# Patient Record
Sex: FEMALE | Race: WHITE | NOT HISPANIC OR LATINO | Employment: FULL TIME | URBAN - METROPOLITAN AREA
[De-identification: names, ages, dates, MRNs, and addresses within clinical notes are randomized per-mention and may not be internally consistent; named-entity substitution may affect disease eponyms.]

---

## 2017-02-26 DIAGNOSIS — K21.9 GASTROESOPHAGEAL REFLUX DISEASE WITHOUT ESOPHAGITIS: ICD-10-CM

## 2017-02-26 NOTE — TELEPHONE ENCOUNTER
omeprazole (PRILOSEC) 40 MG capsule      Last Written Prescription Date: 3/7/16  Last Fill Quantity: 90,  # refills: 3   Last Office Visit with [facility], [facility] or [facility] prescribing provider: 9/27/16

## 2017-02-28 ENCOUNTER — ALLIED HEALTH/NURSE VISIT (OUTPATIENT)
Dept: NURSING | Facility: CLINIC | Age: 52
End: 2017-02-28
Payer: COMMERCIAL

## 2017-02-28 DIAGNOSIS — Z11.1 PPD SCREENING TEST: Primary | ICD-10-CM

## 2017-02-28 PROCEDURE — 99207 ZZC NO CHARGE NURSE ONLY: CPT

## 2017-02-28 PROCEDURE — 86580 TB INTRADERMAL TEST: CPT

## 2017-02-28 RX ORDER — OMEPRAZOLE 40 MG/1
40 CAPSULE, DELAYED RELEASE ORAL DAILY
Qty: 90 CAPSULE | Refills: 1 | Status: SHIPPED | OUTPATIENT
Start: 2017-02-28 | End: 2017-04-19 | Stop reason: ALTCHOICE

## 2017-02-28 NOTE — MR AVS SNAPSHOT
After Visit Summary   2/28/2017    Kylie Arellano    MRN: [id number]           Patient Information     Date Of Birth          1965        Visit Information        Provider Department      2/28/2017 5:20 PM BK ANCILLARY [facility]        Today's Diagnoses     PPD screening test    -  1       Follow-ups after your visit        Follow-up notes from your care team     Return for injection.      Your next 10 appointments already scheduled     Apr 12, 2017  8:00 AM CDT   LAB with [facility] LAB   [facility] ([facility])    [address] [facility] [id number] [phone]           Patient must bring picture ID.  Patient should be prepared to give a urine specimen  Please do not eat 10-12 hours before your appointment if you are coming in fasting for labs on lipids, cholesterol, or glucose (sugar).  Pregnant women should follow their Care Team instructions. Water with medications is okay. Do not drink coffee or other fluids.   If you have concerns about taking  your medications, please ask at office or if scheduling via [employer], send a message by clicking on Secure Messaging, Message Your Care Team.            Apr 19, 2017  8:00 AM CDT   PHYSICAL with Lynda Painting MD   [facility] ([facility])    [address] [facility] [id number] [phone]              Who to contact     If you have questions or need follow up information about today's clinic visit or your schedule please contact [facility] directly at [phone].  Normal or non-critical lab and imaging results will be communicated to you by MyChart, letter or phone within 4 business days after the clinic has received the results. If you do not hear from us within 7 days, please contact the clinic through MyChart or phone. If you have a critical or abnormal lab result, we will notify  you by phone as soon as possible.  Submit refill requests through [employer] or call your pharmacy and they will forward the refill request to us. Please allow 3 business days for your refill to be completed.          Additional Information About Your Visit        [employer] Information     [employer] gives you secure access to your electronic health record. If you see a primary care provider, you can also send messages to your care team and make appointments. If you have questions, please call your primary care clinic.  If you do not have a primary care provider, please call [phone] and they will assist you.        Care EveryWhere ID     This is your Care EveryWhere ID. This could be used by other organizations to access your [facility] medical records  [id number]         Blood Pressure from Last 3 Encounters:   09/27/16 (!) 134/94   09/14/16 134/82   06/27/16 139/88    Weight from Last 3 Encounters:   09/27/16 166 lb (75.3 kg)   09/14/16 168 lb (76.2 kg)   06/27/16 170 lb (77.1 kg)              We Performed the Following     TB INTRADERMAL TEST        Primary Care Provider Office Phone # Fax #    Lynda Destiny Painting [phone]       [facility] [address] [place]  [facility] [address]        Thank you!     Thank you for choosing [facility]  for your care. Our goal is always to provide you with excellent care. Hearing back from our patients is one way we can continue to improve our services. Please take a few minutes to complete the written survey that you may receive in the mail after your visit with us. Thank you!             Your Updated Medication List - Protect others around you: Learn how to safely use, store and throw away your medicines at www.disposemymeds.org.          This list is accurate as of: 2/28/17  5:58 PM.  Always use your most recent med list.                   Brand Name Dispense Instructions for use    acyclovir 5 % ointment     ZOVIRAX    30 g    Apply  topically 5 times daily. As needed for cold sores.       ADVIL PO          desonide 0.05 % cream    DESOWEN    60 g    Apply sparingly to Legs, up to twice daily as needed.       fluticasone 50 MCG/ACT spray    FLONASE         FORADIL AEROLIZER 12 MCG capsule   Generic drug:  formoterol      Inhale 12 mcg into the lungs 2 times daily       ipratropium - albuterol 0.5 mg/2.5 mg/3 mL 0.5-2.5 (3) MG/3ML neb solution    DUONEB    30 vial    Take 1 vial (3 mLs) by nebulization every 4 hours as needed for shortness of breath / dyspnea or wheezing       levocetirizine 5 MG tablet    XYZAL    30 tablet    Take 1 tablet by mouth every evening.       levonorgestrel-ethinyl estradiol per tablet    TRIVORA (28)    112 tablet    One tablet continuously x 3 months, then off one week, repeat cycle x one year       losartan 100 MG tablet    COZAAR    90 tablet    TAKE 1 TABLET (100 MG) BY MOUTH DAILY       mometasone 220 MCG/INH Inhaler    ASMANEX 120 METERED DOSES    1 Inhaler    Inhale 2 puffs into the lungs daily.       montelukast 10 MG tablet    SINGULAIR         omeprazole 40 MG capsule    priLOSEC    90 capsule    Take 1 capsule (40 mg) by mouth daily       PATADAY 0.2 % Soln   Generic drug:  olopatadine HCl      Apply 1 drop to eye as needed. Each eye       valACYclovir 500 MG tablet    VALTREX    135 tablet    TAKE ONE TABLET EVERYDAY AND INCREASE TO 2 TABLETS DAILY FOR 10 DAYS WITH OUTBREAKS       * VENTOLIN  (90 BASE) MCG/ACT Inhaler   Generic drug:  albuterol      as needed       * albuterol (2.5 MG/3ML) 0.083% neb solution     30 vial    Take 3 mLs by nebulization every 4 hours as needed for shortness of breath / dyspnea.       * Notice:  This list has 2 medication(s) that are the same as other medications prescribed for you. Read the directions carefully, and ask your doctor or other care provider to review them with you.

## 2017-02-28 NOTE — PROGRESS NOTES
The patient is asked the following questions today and these are her answers:    -Have you had a mantoux administered in the past 30 days?    No  -Have you had a previous positive Mantoux.  No  -Have you received BCG in the past.  No  -Have you had a live vaccine  (MMR, Varicella, OPV, Yellow Fever) in the last 6 weeks.  No  -Have you had and active  viral or bacterial infection in the past 6 weeks.  No  -Have you received corticosteroids or immunosuppressive agents in the past 6 weeks.  Yes Due to asthma symptoms.   -Have you been diagnosed with HIV?  No  -Do you have a maglinancy?  No   Margaux Holly

## 2017-02-28 NOTE — TELEPHONE ENCOUNTER
Routing refill request to provider for review/approval because:  Please see alert for prior auth    ADELA Babcock, Clinical RN Neeta Carballo.

## 2017-03-03 ENCOUNTER — ALLIED HEALTH/NURSE VISIT (OUTPATIENT)
Dept: NURSING | Facility: CLINIC | Age: 52
End: 2017-03-03
Payer: COMMERCIAL

## 2017-03-03 DIAGNOSIS — Z11.1 SCREENING EXAMINATION FOR PULMONARY TUBERCULOSIS: Primary | ICD-10-CM

## 2017-03-03 LAB
PPDINDURATION: 0 MM (ref 0–5)
PPDREDNESS: 0 MM

## 2017-03-03 PROCEDURE — 99207 ZZC NO CHARGE NURSE ONLY: CPT

## 2017-03-03 NOTE — MR AVS SNAPSHOT
After Visit Summary   3/3/2017    Kylie Arellano    MRN: [id number]           Patient Information     Date Of Birth          1965        Visit Information        Provider Department      3/3/2017 8:30 AM BK RN [facility]        Today's Diagnoses     Screening examination for pulmonary tuberculosis    -  1       Follow-ups after your visit        Follow-up notes from your care team     Return if symptoms worsen or fail to improve.      Your next 10 appointments already scheduled     Apr 12, 2017  8:00 AM CDT   LAB with [facility]   [facility] ([facility])    [address] [facility] [id number] [phone]           Patient must bring picture ID.  Patient should be prepared to give a urine specimen  Please do not eat 10-12 hours before your appointment if you are coming in fasting for labs on lipids, cholesterol, or glucose (sugar).  Pregnant women should follow their Care Team instructions. Water with medications is okay. Do not drink coffee or other fluids.   If you have concerns about taking  your medications, please ask at office or if scheduling via [employer] send a message by clicking on Secure Messaging, Message Your Care Team.            Apr 19, 2017  8:00 AM CDT   PHYSICAL with Lynda Painting MD   [facility] ([facility])    [address] [place] [id number] [phone]              Who to contact     If you have questions or need follow up information about today's clinic visit or your schedule please contact [facility] directly at [phone].  Normal or non-critical lab and imaging results will be communicated to you by MyChart, letter or phone within 4 business days after the clinic has received the results. If you do not hear from us within 7 days, please contact the clinic through MyChart or phone. If you have a  critical or abnormal lab result, we will notify you by phone as soon as possible.  Submit refill requests through [employer] or call your pharmacy and they will forward the refill request to us. Please allow 3 business days for your refill to be completed.          Additional Information About Your Visit        [employer] Information     [employer] gives you secure access to your electronic health record. If you see a primary care provider, you can also send messages to your care team and make appointments. If you have questions, please call your primary care clinic.  If you do not have a primary care provider, please call [phone] and they will assist you.        Care EveryWhere ID     This is your Care EveryWhere ID. This could be used by other organizations to access your [place] medical records  [id number]         Blood Pressure from Last 3 Encounters:   09/27/16 (!) 134/94   09/14/16 134/82   06/27/16 139/88    Weight from Last 3 Encounters:   09/27/16 166 lb (75.3 kg)   09/14/16 168 lb (76.2 kg)   06/27/16 170 lb (77.1 kg)              Today, you had the following     No orders found for display       Primary Care Provider Office Phone # Fax #    Lynda Destiny Painting [phone]       [facility] [address] [facility] [address]        Thank you!     Thank you for choosing [facility]  for your care. Our goal is always to provide you with excellent care. Hearing back from our patients is one way we can continue to improve our services. Please take a few minutes to complete the written survey that you may receive in the mail after your visit with us. Thank you!             Your Updated Medication List - Protect others around you: Learn how to safely use, store and throw away your medicines at www.disposemymeds.org.          This list is accurate as of: 3/3/17  8:33 AM.  Always use your most recent med list.                   Brand Name Dispense  Instructions for use    acyclovir 5 % ointment    ZOVIRAX    30 g    Apply  topically 5 times daily. As needed for cold sores.       ADVIL PO          desonide 0.05 % cream    DESOWEN    60 g    Apply sparingly to Legs, up to twice daily as needed.       fluticasone 50 MCG/ACT spray    FLONASE         FORADIL AEROLIZER 12 MCG capsule   Generic drug:  formoterol      Inhale 12 mcg into the lungs 2 times daily       ipratropium - albuterol 0.5 mg/2.5 mg/3 mL 0.5-2.5 (3) MG/3ML neb solution    DUONEB    30 vial    Take 1 vial (3 mLs) by nebulization every 4 hours as needed for shortness of breath / dyspnea or wheezing       levocetirizine 5 MG tablet    XYZAL    30 tablet    Take 1 tablet by mouth every evening.       levonorgestrel-ethinyl estradiol per tablet    TRIVORA (28)    112 tablet    One tablet continuously x 3 months, then off one week, repeat cycle x one year       losartan 100 MG tablet    COZAAR    90 tablet    TAKE 1 TABLET (100 MG) BY MOUTH DAILY       mometasone 220 MCG/INH Inhaler    ASMANEX 120 METERED DOSES    1 Inhaler    Inhale 2 puffs into the lungs daily.       montelukast 10 MG tablet    SINGULAIR         omeprazole 40 MG capsule    priLOSEC    90 capsule    Take 1 capsule (40 mg) by mouth daily       PATADAY 0.2 % Soln   Generic drug:  olopatadine HCl      Apply 1 drop to eye as needed. Each eye       valACYclovir 500 MG tablet    VALTREX    135 tablet    TAKE ONE TABLET EVERYDAY AND INCREASE TO 2 TABLETS DAILY FOR 10 DAYS WITH OUTBREAKS       * VENTOLIN  (90 BASE) MCG/ACT Inhaler   Generic drug:  albuterol      as needed       * albuterol (2.5 MG/3ML) 0.083% neb solution     30 vial    Take 3 mLs by nebulization every 4 hours as needed for shortness of breath / dyspnea.       * Notice:  This list has 2 medication(s) that are the same as other medications prescribed for you. Read the directions carefully, and ask your doctor or other care provider to review them with you.

## 2017-03-03 NOTE — PROGRESS NOTES
Mantoux result: Negative  Lab Results   Component Value Date    PPDREDNESS 0 03/03/2017    PPDINDURATIO 0 03/03/2017     Copy of Results given to patient.   Rachelle Frazier RN

## 2017-03-19 DIAGNOSIS — I10 HYPERTENSION GOAL BP (BLOOD PRESSURE) < 140/90: ICD-10-CM

## 2017-03-19 NOTE — TELEPHONE ENCOUNTER
losartan (COZAAR) 100 MG tablet      Last Written Prescription Date: 3/7/16  Last Fill Quantity: 90, # refills: 4  Last Office Visit with G, P or [facility] prescribing provider: 9/27/16  Next 5 appointments (look out 90 days)     Apr 19, 2017  8:00 AM CDT   PHYSICAL with Lynda Painting MD   [facility] ([facility])    [address] [place] [address] [phone]                   Potassium   Date Value Ref Range Status   09/14/2016 3.8 3.4 - 5.3 mmol/L Final     Creatinine   Date Value Ref Range Status   09/14/2016 0.89 0.52 - 1.04 mg/dL Final     BP Readings from Last 3 Encounters:   09/27/16 (!) 134/94   09/14/16 134/82   06/27/16 139/88           Henry Faarax  [facility]

## 2017-03-21 RX ORDER — LOSARTAN POTASSIUM 100 MG/1
TABLET ORAL
Qty: 90 TABLET | Refills: 0 | Status: SHIPPED | OUTPATIENT
Start: 2017-03-21 | End: 2017-04-19

## 2017-03-21 NOTE — TELEPHONE ENCOUNTER
Routing refill request to provider for review/approval because:  BP above goal  Ivy Mata RN

## 2017-04-06 ENCOUNTER — DOCUMENTATION ONLY (OUTPATIENT)
Dept: LAB | Facility: CLINIC | Age: 52
End: 2017-04-06

## 2017-04-06 DIAGNOSIS — I10 HYPERTENSION GOAL BP (BLOOD PRESSURE) < 140/90: Primary | ICD-10-CM

## 2017-04-06 DIAGNOSIS — Z00.00 ENCOUNTER FOR ROUTINE ADULT HEALTH EXAMINATION WITHOUT ABNORMAL FINDINGS: ICD-10-CM

## 2017-04-06 NOTE — PROGRESS NOTES
Patient has PV-lab appointment on 04.12.17 at 08.:00 am , please review pended orders and place any additional future orders that may be needed.      Thank you,  [facility]

## 2017-04-12 DIAGNOSIS — Z00.00 ENCOUNTER FOR ROUTINE ADULT HEALTH EXAMINATION WITHOUT ABNORMAL FINDINGS: ICD-10-CM

## 2017-04-12 DIAGNOSIS — I10 HYPERTENSION GOAL BP (BLOOD PRESSURE) < 140/90: ICD-10-CM

## 2017-04-12 LAB
ALBUMIN SERPL-MCNC: 3.1 G/DL (ref 3.4–5)
ALP SERPL-CCNC: 95 U/L (ref 40–150)
ALT SERPL W P-5'-P-CCNC: 25 U/L (ref 0–50)
ANION GAP SERPL CALCULATED.3IONS-SCNC: 6 MMOL/L (ref 3–14)
AST SERPL W P-5'-P-CCNC: 18 U/L (ref 0–45)
BILIRUB SERPL-MCNC: 0.4 MG/DL (ref 0.2–1.3)
BUN SERPL-MCNC: 14 MG/DL (ref 7–30)
CALCIUM SERPL-MCNC: 8.5 MG/DL (ref 8.5–10.1)
CHLORIDE SERPL-SCNC: 109 MMOL/L (ref 94–109)
CHOLEST SERPL-MCNC: 174 MG/DL
CO2 SERPL-SCNC: 26 MMOL/L (ref 20–32)
CREAT SERPL-MCNC: 0.95 MG/DL (ref 0.52–1.04)
GFR SERPL CREATININE-BSD FRML MDRD: 62 ML/MIN/1.7M2
GLUCOSE SERPL-MCNC: 80 MG/DL (ref 70–99)
HDLC SERPL-MCNC: 90 MG/DL
LDLC SERPL CALC-MCNC: 38 MG/DL
NONHDLC SERPL-MCNC: 84 MG/DL
POTASSIUM SERPL-SCNC: 4.3 MMOL/L (ref 3.4–5.3)
PROT SERPL-MCNC: 6.6 G/DL (ref 6.8–8.8)
SODIUM SERPL-SCNC: 141 MMOL/L (ref 133–144)
TRIGL SERPL-MCNC: 231 MG/DL

## 2017-04-12 PROCEDURE — 80053 COMPREHEN METABOLIC PANEL: CPT | Performed by: FAMILY MEDICINE

## 2017-04-12 PROCEDURE — 36415 COLL VENOUS BLD VENIPUNCTURE: CPT | Performed by: FAMILY MEDICINE

## 2017-04-12 PROCEDURE — 80061 LIPID PANEL: CPT | Performed by: FAMILY MEDICINE

## 2017-04-15 ENCOUNTER — TELEPHONE (OUTPATIENT)
Dept: FAMILY MEDICINE | Facility: CLINIC | Age: 52
End: 2017-04-15

## 2017-04-15 NOTE — TELEPHONE ENCOUNTER
Patient is due for a mammogram. Left message for patient to call back  Neeta Carballo Scheduling at [phone]. Encounter sent to reception team to send letter to home address.     If patient returns call, please help them schedule a mammogram.     Thank you,    [facility]

## 2017-04-15 NOTE — LETTER
April 15, 2017      Kylie Arellano  [address]  [facility] [address]    Dear Kylie Arellano,     At [facility]  we care about your health and are committed to providing quality patient care, which includes staying current on preventative cancer screenings.  You can increase your chances of finding and treating cancers through regular screenings.      Our records show that you are due for the following screening:      Mammogram for breast cancer   Recommended every 1-2 years for women age 50 and older  Mammograms help detect breast cancer, which is the most common cancer among women in the [place].  You may need to start having mammograms earlier and more often if you have had breast cancer, breast problems, or a family history of breast cancer.       You may contact us by phone at [facility] at [phone] to schedule your mammogram at your earliest convenience.    If you have already had a mammogram at another facility, please call us so that we may update your chart.      Your partners in health,      Quality Committee   [facility]

## 2017-04-16 DIAGNOSIS — B00.1 RECURRENT COLD SORES: ICD-10-CM

## 2017-04-16 NOTE — TELEPHONE ENCOUNTER
valACYclovir (VALTREX) 500 MG tablet     Last Written Prescription Date: 9/14/16  Last Fill Quantity: 135, # refills: 1  Last Office Visit with G, P or [facility] prescribing provider: 9/27/16   Next 5 appointments (look out 90 days)     Apr 19, 2017  8:00 AM CDT   PHYSICAL with Lynda Painting MD   [facility] ([facility])    [address] [place] [id number] [phone]                   Creatinine   Date Value Ref Range Status   04/12/2017 0.95 0.52 - 1.04 mg/dL Final           Henry Faarax  [facility]

## 2017-04-18 RX ORDER — VALACYCLOVIR HYDROCHLORIDE 500 MG/1
TABLET, FILM COATED ORAL
Qty: 135 TABLET | Refills: 0 | Status: SHIPPED | OUTPATIENT
Start: 2017-04-18 | End: 2017-04-19

## 2017-04-19 ENCOUNTER — OFFICE VISIT (OUTPATIENT)
Dept: FAMILY MEDICINE | Facility: CLINIC | Age: 52
End: 2017-04-19
Payer: COMMERCIAL

## 2017-04-19 VITALS
WEIGHT: 168.2 LBS | HEART RATE: 81 BPM | SYSTOLIC BLOOD PRESSURE: 128 MMHG | TEMPERATURE: 97 F | OXYGEN SATURATION: 98 % | BODY MASS INDEX: 29.8 KG/M2 | HEIGHT: 63 IN | DIASTOLIC BLOOD PRESSURE: 89 MMHG

## 2017-04-19 DIAGNOSIS — B00.1 RECURRENT COLD SORES: ICD-10-CM

## 2017-04-19 DIAGNOSIS — Z00.00 ENCOUNTER FOR ROUTINE ADULT HEALTH EXAMINATION WITHOUT ABNORMAL FINDINGS: Primary | ICD-10-CM

## 2017-04-19 DIAGNOSIS — I10 HYPERTENSION GOAL BP (BLOOD PRESSURE) < 140/90: ICD-10-CM

## 2017-04-19 DIAGNOSIS — K21.9 GASTROESOPHAGEAL REFLUX DISEASE WITHOUT ESOPHAGITIS: ICD-10-CM

## 2017-04-19 DIAGNOSIS — L20.84 INTRINSIC ATOPIC DERMATITIS: ICD-10-CM

## 2017-04-19 DIAGNOSIS — Z30.41 ENCOUNTER FOR SURVEILLANCE OF CONTRACEPTIVE PILLS: ICD-10-CM

## 2017-04-19 DIAGNOSIS — Z12.11 SCREEN FOR COLON CANCER: ICD-10-CM

## 2017-04-19 PROCEDURE — 99396 PREV VISIT EST AGE 40-64: CPT | Performed by: FAMILY MEDICINE

## 2017-04-19 PROCEDURE — G0145 SCR C/V CYTO,THINLAYER,RESCR: HCPCS | Performed by: FAMILY MEDICINE

## 2017-04-19 PROCEDURE — 87624 HPV HI-RISK TYP POOLED RSLT: CPT | Performed by: FAMILY MEDICINE

## 2017-04-19 RX ORDER — LOSARTAN POTASSIUM 100 MG/1
100 TABLET ORAL DAILY
Qty: 90 TABLET | Refills: 3 | Status: SHIPPED | OUTPATIENT
Start: 2017-04-19 | End: 2018-04-16

## 2017-04-19 RX ORDER — VALACYCLOVIR HYDROCHLORIDE 500 MG/1
TABLET, FILM COATED ORAL
Qty: 135 TABLET | Refills: 3 | Status: SHIPPED | OUTPATIENT
Start: 2017-04-19 | End: 2018-06-11

## 2017-04-19 RX ORDER — FLUTICASONE PROPIONATE 50 MCG
SPRAY, SUSPENSION (ML) NASAL
Qty: 1 BOTTLE | Refills: 1 | Status: CANCELLED | OUTPATIENT
Start: 2017-04-19

## 2017-04-19 RX ORDER — OMEPRAZOLE 40 MG/1
40 CAPSULE, DELAYED RELEASE ORAL DAILY
Qty: 90 CAPSULE | Refills: 1 | Status: CANCELLED | OUTPATIENT
Start: 2017-04-19

## 2017-04-19 RX ORDER — DESONIDE 0.5 MG/G
CREAM TOPICAL
Qty: 60 G | Refills: 2 | Status: SHIPPED | OUTPATIENT
Start: 2017-04-19 | End: 2019-06-25

## 2017-04-19 RX ORDER — MONTELUKAST SODIUM 10 MG/1
TABLET ORAL
Qty: 30 TABLET | Refills: 2 | Status: CANCELLED | OUTPATIENT
Start: 2017-04-19

## 2017-04-19 RX ORDER — ACYCLOVIR 50 MG/G
OINTMENT TOPICAL
Qty: 30 G | Refills: 2 | Status: SHIPPED | OUTPATIENT
Start: 2017-04-19 | End: 2018-08-09

## 2017-04-19 NOTE — PROGRESS NOTES
SUBJECTIVE:     CC: Kylie Arellano is an 52 year old woman who presents for preventive health visit.     Annual Exam:  Getting at least 3 servings of Calcium per day:: Yes  Bi-annual eye exam:: NO  Dental care twice a year:: Yes  Sleep apnea or symptoms of sleep apnea:: None  Diet:: Regular (no restrictions)  Frequency of exercise:: 2-3 days/week  Taking medications regularly:: Yes  Medication side effects:: None  Additional concerns today:: No  Q1: Little interest or pleasure in doing things: 0=Not at all  Q2: Feeling down, depressed or hopeless: 0=Not at all  PHQ-2 Score: 0  Duration of exercise:: Greater than 60 minutes    HTN - taking medication as directed.  Recurrent cold sores - doing well with suppressive therapy.  GERD - symptoms return if omeprazole missed for 2 days or more.  Dermatitis - stable with topical.  Contraception - doing well on pills.      Today's PHQ-2 Score:   PHQ-2 ( 1999 [employer]) 4/16/2017 9/14/2016   Q1: Little interest or pleasure in doing things - 0   Q2: Feeling down, depressed or hopeless - 0   PHQ-2 Score - 0   Little interest or pleasure in doing things Not at all -   Feeling down, depressed or hopeless Not at all -   PHQ-2 Score 0 -       Abuse: Current or Past(Physical, Sexual or Emotional)- No  Do you feel safe in your environment - Yes    Social History   Substance Use Topics     Smoking status: Never Smoker     Smokeless tobacco: Never Used      Comment: no second hand smoke     Alcohol use 0.0 oz/week     0 Standard drinks or equivalent per week      Comment: occassional     The patient does not drink >3 drinks per day nor >7 drinks per week.    Recent Labs   Lab Test  04/12/17   0806  01/07/15   1139  12/07/11   0734   CHOL  174  197  162   HDL  90  69  47*   LDL  38  90  77   TRIG  231*  190*  189*   CHOLHDLRATIO   --   2.9  3.4   NHDL  84   --    --        Reviewed orders with patient.  Reviewed health maintenance and updated orders accordingly - Yes    Mammo Decision  "Support:  Patient over age 50, mutual decision to screen reflected in health maintenance.    Pertinent mammograms are reviewed under the imaging tab.  History of abnormal Pap smear: YES - updated in Problem List and Health Maintenance accordingly    Reviewed and updated as needed this visit by clinical staff  Tobacco  Allergies  Meds  Problems  Med Hx  Surg Hx  Fam Hx  Soc Hx          Reviewed and updated as needed this visit by Provider  Allergies  Meds  Problems            ROS:  C: NEGATIVE for fever, chills, change in weight  I: NEGATIVE for worrisome rashes, moles or lesions  E: NEGATIVE for vision changes or irritation  ENT: NEGATIVE for ear, mouth and throat problems  R: NEGATIVE for significant cough or SOB  B: NEGATIVE for masses, tenderness or discharge  CV: NEGATIVE for chest pain, palpitations or peripheral edema  GI: NEGATIVE for nausea, abdominal pain, heartburn, or change in bowel habits  : NEGATIVE for unusual urinary or vaginal symptoms. Periods are regular.  M: NEGATIVE for significant arthralgias or myalgia  N: NEGATIVE for weakness, dizziness or paresthesias  P: NEGATIVE for changes in mood or affect    Problem list, Medication list, Allergies, and Medical/Social/Surgical histories reviewed in EPIC and updated as appropriate.  OBJECTIVE:     /89 (BP Location: Left arm, Patient Position: Chair, Cuff Size: Adult Regular)  Pulse 81  Temp 97  F (36.1  C) (Oral)  Ht 5' 3.47\" (1.612 m)  Wt 168 lb 3.2 oz (76.3 kg)  LMP 02/22/2017  SpO2 98%  BMI 29.36 kg/m2  EXAM:  GENERAL: healthy, alert and no distress  EYES: Eyes grossly normal to inspection, PERRL and conjunctivae and sclerae normal  HENT: ear canals and TM's normal, nose and mouth without ulcers or lesions  NECK: no adenopathy, no asymmetry, masses, or scars and thyroid normal to palpation  RESP: lungs clear to auscultation - no rales, rhonchi or wheezes  BREAST: normal without masses, tenderness or nipple discharge and no " palpable axillary masses or adenopathy  CV: regular rate and rhythm, normal S1 S2, no S3 or S4, no murmur, click or rub, no peripheral edema and peripheral pulses strong  ABDOMEN: soft, nontender, no hepatosplenomegaly, no masses and bowel sounds normal   (female): normal female external genitalia, normal urethral meatus, vaginal mucosa pink, moist, well rugated, and normal cervix/adnexa/uterus without masses or discharge  MS: no gross musculoskeletal defects noted, no edema  SKIN: no suspicious lesions or rashes  NEURO: Normal strength and tone, mentation intact and speech normal  PSYCH: mentation appears normal, affect normal/bright    ASSESSMENT/PLAN:     1. Encounter for routine adult health examination without abnormal findings  Routine preventive  - Pap imaged thin layer screen with HPV - recommended age 30 - 65 years (select HPV order below)  - HPV High Risk Types DNA Cervical    2. Recurrent cold sores  Stable refilled  - valACYclovir (VALTREX) 500 MG tablet; TAKE 1 TABLET BY MOUTH EVERY DAY AND INCREASE TO 2 TABLETS DAILY FOR 10 DAYS WITH OUTBREAKS  Dispense: 135 tablet; Refill: 3  - acyclovir (ZOVIRAX) 5 % ointment; Apply  topically 5 times daily. As needed for cold sores.  Dispense: 30 g; Refill: 2    3. Hypertension goal BP (blood pressure) < 140/90  Controlled - refilled  - losartan (COZAAR) 100 MG tablet; Take 1 tablet (100 mg) by mouth daily  Dispense: 90 tablet; Refill: 3    4. Gastroesophageal reflux disease without esophagitis  Change to zantac due to safety concerns with PPI.  Patient will call if symptoms recur.  - ranitidine (ZANTAC) 300 MG tablet; Take 1 tablet (300 mg) by mouth At Bedtime  Dispense: 90 tablet; Refill: 3    5. Intrinsic atopic dermatitis  Refilled  - desonide (DESOWEN) 0.05 % cream; Apply sparingly to Legs, up to twice daily as needed.  Dispense: 60 g; Refill: 2    6. Encounter for surveillance of contraceptive pills  Refilled  - levonorgestrel-ethinyl estradiol (TRIVORA,  "28,) per tablet; One tablet continuously x 3 months, then off one week, repeat cycle x one year  Dispense: 112 tablet; Refill: 4    7. Screen for colon cancer  Screening  - GASTROENTEROLOGY ADULT REF PROCEDURE ONLY    The uses and side effects, including black box warnings as appropriate, were discussed in detail.  All patient questions were answered.  The patient was instructed to call immediately if any side effects developed.     Follow up yearly.    COUNSELING:   Reviewed preventive health counseling, as reflected in patient instructions         reports that she has never smoked. She has never used smokeless tobacco.    Estimated body mass index is 29.36 kg/(m^2) as calculated from the following:    Height as of this encounter: 5' 3.47\" (1.612 m).    Weight as of this encounter: 168 lb 3.2 oz (76.3 kg).   Weight management plan: Discussed healthy diet and exercise guidelines and patient will follow up in 12 months in clinic to re-evaluate.    Lynda Painting MD  [facility]  "

## 2017-04-19 NOTE — MR AVS SNAPSHOT
After Visit Summary   4/19/2017    Kylie Arellano    MRN: [id number]           Patient Information     Date Of Birth          1965        Visit Information        Provider Department      4/19/2017 8:00 AM Lynda Goins MD [facility]        Today's Diagnoses     Encounter for routine adult health examination without abnormal findings    -  1    Recurrent cold sores        Hypertension goal BP (blood pressure) < 140/90        Gastroesophageal reflux disease without esophagitis        Intrinsic atopic dermatitis        Encounter for surveillance of contraceptive pills        Screen for colon cancer          Care Instructions    Based on your medical history and these are the current health maintenance or preventive care services that you are due for (some may have been done at this visit)  Health Maintenance Due   Topic Date Due     EYE EXAM Q1 YEAR( NO INBASKET)  11/14/2015     ASTHMA CONTROL TEST Q6 MOS (NO INBASKET)  10/18/2016     PAP Q6 MOS DIAGNOSTIC  10/18/2016     COLON CANCER SCREEN (SYSTEM ASSIGNED)  02/01/2017     ASTHMA ACTION PLAN Q1 YR (NO INBASKET)  03/07/2017         At [facility], we strive to deliver an exceptional experience to you, every time we see you.    If you receive a survey in the mail, please send us back your thoughts. We really do value your feedback.    Your care team's suggested websites for health information:  Www.[employer].org : Up to date and easily searchable information on multiple topics.  Www.medlineplus.gov : medication info, interactive tutorials, watch real surgeries online  Www.familydoctor.org : good info from the Academy of Family Physicians  Www.cdc.gov : public health info, travel advisories, epidemics (H1N1)  Www.aap.org : children's health info, normal development, vaccinations  Www.health[place] : [place] dept of health, public health issues in [place], N1N1    How to contact your care team:    Team Ema/Herbert [phone]         Pharmacy [phone]    Dr. Floyd, Lesly White PA-C, Dr. Pope, Janice Ceron APRN CNP, Shavon Lundberg PA-C, Dr. Jones, and WALESKA Taylor CNP    Team RNs: Ryann & Adelaida      Clinic hours  M-Th 7 am-7 pm   Fri 7 am-5 pm.   Urgent care M-F 11 am-9 pm,   Sat/Sun 9 am-5 pm.  Pharmacy M-Th 8 am-8 pm Fri 8 am-6 pm  Sat/Sun 9 am-5 pm.     All password changes, disabled accounts, or ID changes in "[employer]/MyHealth will be done by our Access Services Department.    If you need help with your account or password, call: [phone]. Clinic staff no longer has the ability to change passwords.     Preventive Health Recommendations  Female Ages 50 - 64    Yearly exam: See your health care provider every year in order to  o Review health changes.   o Discuss preventive care.    o Review your medicines if your doctor has prescribed any.      Get a Pap test every three years (unless you have an abnormal result and your provider advises testing more often).    If you get Pap tests with HPV test, you only need to test every 5 years, unless you have an abnormal result.     You do not need a Pap test if your uterus was removed (hysterectomy) and you have not had cancer.    You should be tested each year for STDs (sexually transmitted diseases) if you're at risk.     Have a mammogram every 1 to 2 years.    Have a colonoscopy at age 50, or have a yearly FIT test (stool test). These exams screen for colon cancer.      Have a cholesterol test every 5 years, or more often if advised.    Have a diabetes test (fasting glucose) every three years. If you are at risk for diabetes, you should have this test more often.     If you are at risk for osteoporosis (brittle bone disease), think about having a bone density scan (DEXA).    Shots: Get a flu shot each year. Get a tetanus shot every 10 years.    Nutrition:     Eat at least 5 servings of fruits and vegetables each  day.    Eat whole-grain bread, whole-wheat pasta and brown rice instead of white grains and rice.    Talk to your provider about Calcium and Vitamin D.     Lifestyle    Exercise at least 150 minutes a week (30 minutes a day, 5 days a week). This will help you control your weight and prevent disease.    Limit alcohol to one drink per day.    No smoking.     Wear sunscreen to prevent skin cancer.     See your dentist every six months for an exam and cleaning.    See your eye doctor every 1 to 2 years.          Follow-ups after your visit        Additional Services     GASTROENTEROLOGY ADULT REF PROCEDURE ONLY       Last Lab Result: Creatinine (mg/dL)       Date                     Value                 04/12/2017               0.95             ----------  Body mass index is 29.36 kg/(m^2).     Needed:  No  Language:  English    Patient will be contacted to schedule procedure.     Please be aware that coverage of these services is subject to the terms and limitations of your health insurance plan.  Call member services at your health plan with any benefit or coverage questions.  Any procedures must be performed at a [facility] facility OR coordinated by your clinic's referral office.    Please bring the following with you to your appointment:    (1) Any X-Rays, CTs or MRIs which have been performed.  Contact the facility where they were done to arrange for  prior to your scheduled appointment.    (2) List of current medications   (3) This referral request   (4) Any documents/labs given to you for this referral                  Who to contact     If you have questions or need follow up information about today's clinic visit or your schedule please contact [facility] directly at [phone].  Normal or non-critical lab and imaging results will be communicated to you by MyChart, letter or phone within 4 business days after the clinic has received the results. If you do not hear from us  "within 7 days, please contact the clinic through bttn or phone. If you have a critical or abnormal lab result, we will notify you by phone as soon as possible.  Submit refill requests through bttn or call your pharmacy and they will forward the refill request to us. Please allow 3 business days for your refill to be completed.          Additional Information About Your Visit        [facility] Team Connect Information     bttn gives you secure access to your electronic health record. If you see a primary care provider, you can also send messages to your care team and make appointments. If you have questions, please call your primary care clinic.  If you do not have a primary care provider, please call [phone] and they will assist you.        Care EveryWhere ID     This is your Care EveryWhere ID. This could be used by other organizations to access your [facility] medical records  [id number]        Your Vitals Were     Pulse Temperature Height Last Period Pulse Oximetry BMI (Body Mass Index)    81 97  F (36.1  C) (Oral) 5' 3.47\" (1.612 m) 02/22/2017 98% 29.36 kg/m2       Blood Pressure from Last 3 Encounters:   04/19/17 128/89   09/27/16 (!) 134/94   09/14/16 134/82    Weight from Last 3 Encounters:   04/19/17 168 lb 3.2 oz (76.3 kg)   09/27/16 166 lb (75.3 kg)   09/14/16 168 lb (76.2 kg)              We Performed the Following     GASTROENTEROLOGY ADULT REF PROCEDURE ONLY     HPV High Risk Types DNA Cervical     Pap imaged thin layer screen with HPV - recommended age 30 - 65 years (select HPV order below)          Today's Medication Changes          These changes are accurate as of: 4/19/17  8:32 AM.  If you have any questions, ask your nurse or doctor.               Start taking these medicines.        Dose/Directions    ranitidine 300 MG tablet   Commonly known as:  ZANTAC   Used for:  Gastroesophageal reflux disease without esophagitis   Started by:  Lynda Goins MD        Dose:  300 mg   Take 1 " tablet (300 mg) by mouth At Bedtime   Quantity:  90 tablet   Refills:  3         These medicines have changed or have updated prescriptions.        Dose/Directions    losartan 100 MG tablet   Commonly known as:  COZAAR   This may have changed:  See the new instructions.   Used for:  Hypertension goal BP (blood pressure) < 140/90   Changed by:  Lynda Goins MD        Dose:  100 mg   Take 1 tablet (100 mg) by mouth daily   Quantity:  90 tablet   Refills:  3       valACYclovir 500 MG tablet   Commonly known as:  VALTREX   This may have changed:  See the new instructions.   Used for:  Recurrent cold sores   Changed by:  Lynda Goins MD        TAKE 1 TABLET BY MOUTH EVERY DAY AND INCREASE TO 2 TABLETS DAILY FOR 10 DAYS WITH OUTBREAKS   Quantity:  135 tablet   Refills:  3            Where to get your medicines      These medications were sent to [id number] - [place], [place] - [address]  [address] [address]     Phone:  [phone]     acyclovir 5 % ointment    desonide 0.05 % cream    levonorgestrel-ethinyl estradiol per tablet    losartan 100 MG tablet    ranitidine 300 MG tablet    valACYclovir 500 MG tablet                Primary Care Provider Office Phone # Fax #    Lynda Painting [phone]       [facility] [address] [place]  [facility] [address]        Thank you!     Thank you for choosing [facility]  for your care. Our goal is always to provide you with excellent care. Hearing back from our patients is one way we can continue to improve our services. Please take a few minutes to complete the written survey that you may receive in the mail after your visit with us. Thank you!             Your Updated Medication List - Protect others around you: Learn how to safely use, store and throw away your medicines at www.disposemymeds.org.          This list is accurate as of:  4/19/17  8:32 AM.  Always use your most recent med list.                   Brand Name Dispense Instructions for use    acyclovir 5 % ointment    ZOVIRAX    30 g    Apply  topically 5 times daily. As needed for cold sores.       ADVIL PO          desonide 0.05 % cream    DESOWEN    60 g    Apply sparingly to Legs, up to twice daily as needed.       fluticasone 50 MCG/ACT spray    FLONASE         ipratropium - albuterol 0.5 mg/2.5 mg/3 mL 0.5-2.5 (3) MG/3ML neb solution    DUONEB    30 vial    Take 1 vial (3 mLs) by nebulization every 4 hours as needed for shortness of breath / dyspnea or wheezing       levocetirizine 5 MG tablet    XYZAL    30 tablet    Take 1 tablet by mouth every evening.       levonorgestrel-ethinyl estradiol per tablet    TRIVORA (28)    112 tablet    One tablet continuously x 3 months, then off one week, repeat cycle x one year       losartan 100 MG tablet    COZAAR    90 tablet    Take 1 tablet (100 mg) by mouth daily       mometasone 220 MCG/INH Inhaler    ASMANEX 120 METERED DOSES    1 Inhaler    Inhale 2 puffs into the lungs daily.       montelukast 10 MG tablet    SINGULAIR         omeprazole 40 MG capsule    priLOSEC    90 capsule    Take 1 capsule (40 mg) by mouth daily       PATADAY 0.2 % Soln   Generic drug:  olopatadine HCl      Apply 1 drop to eye as needed. Each eye       ranitidine 300 MG tablet    ZANTAC    90 tablet    Take 1 tablet (300 mg) by mouth At Bedtime       SEREVENT DISKUS 50 MCG/DOSE diskus inhaler   Generic drug:  salmeterol          valACYclovir 500 MG tablet    VALTREX    135 tablet    TAKE 1 TABLET BY MOUTH EVERY DAY AND INCREASE TO 2 TABLETS DAILY FOR 10 DAYS WITH OUTBREAKS       * VENTOLIN  (90 BASE) MCG/ACT Inhaler   Generic drug:  albuterol      as needed       * albuterol (2.5 MG/3ML) 0.083% neb solution     30 vial    Take 3 mLs by nebulization every 4 hours as needed for shortness of breath / dyspnea.       * Notice:  This list has 2 medication(s) that  are the same as other medications prescribed for you. Read the directions carefully, and ask your doctor or other care provider to review them with you.

## 2017-04-19 NOTE — PATIENT INSTRUCTIONS
Based on your medical history and these are the current health maintenance or preventive care services that you are due for (some may have been done at this visit)  Health Maintenance Due   Topic Date Due     EYE EXAM Q1 YEAR( NO INBASKET)  11/14/2015     ASTHMA CONTROL TEST Q6 MOS (NO INBASKET)  10/18/2016     PAP Q6 MOS DIAGNOSTIC  10/18/2016     COLON CANCER SCREEN (SYSTEM ASSIGNED)  02/01/2017     ASTHMA ACTION PLAN Q1 YR (NO INBASKET)  03/07/2017         At [facility], we strive to deliver an exceptional experience to you, every time we see you.    If you receive a survey in the mail, please send us back your thoughts. We really do value your feedback.    Your care team's suggested websites for health information:  Www.[employer] : Up to date and easily searchable information on multiple topics.  Www.[employer].gov : medication info, interactive tutorials, watch real surgeries online  Www.familydoctor.org : good info from the Academy of Family Physicians  Www.cdc.gov : public health info, travel advisories, epidemics (H1N1)  Www.aap.org : children's health info, normal development, vaccinations  Www.health.[facility].[place].us : [place] dept of health, public health issues in [place], N1N1    How to contact your care team:   Team Ema/Herbert [phone]         Pharmacy [phone]    Dr. Floyd, Lesly White PA-C, Dr. Pope, Janice GALEANO CNP, Shavon Lundberg PA-C, Dr. Jones, and WALESKA Taylor CNP    Team RNs: Ryann & Adelaida      Clinic hours  M-Th 7 am-7 pm   Fri 7 am-5 pm.   Urgent care M-F 11 am-9 pm,   Sat/Sun 9 am-5 pm.  Pharmacy M-Th 8 am-8 pm Fri 8 am-6 pm  Sat/Sun 9 am-5 pm.     All password changes, disabled accounts, or ID changes in [employer]/MyHealth will be done by our Access Services Department.    If you need help with your account or password, call: [phone]. Clinic staff no longer has the ability to change passwords.     Preventive Health  Recommendations  Female Ages 50 - 64    Yearly exam: See your health care provider every year in order to  o Review health changes.   o Discuss preventive care.    o Review your medicines if your doctor has prescribed any.      Get a Pap test every three years (unless you have an abnormal result and your provider advises testing more often).    If you get Pap tests with HPV test, you only need to test every 5 years, unless you have an abnormal result.     You do not need a Pap test if your uterus was removed (hysterectomy) and you have not had cancer.    You should be tested each year for STDs (sexually transmitted diseases) if you're at risk.     Have a mammogram every 1 to 2 years.    Have a colonoscopy at age 50, or have a yearly FIT test (stool test). These exams screen for colon cancer.      Have a cholesterol test every 5 years, or more often if advised.    Have a diabetes test (fasting glucose) every three years. If you are at risk for diabetes, you should have this test more often.     If you are at risk for osteoporosis (brittle bone disease), think about having a bone density scan (DEXA).    Shots: Get a flu shot each year. Get a tetanus shot every 10 years.    Nutrition:     Eat at least 5 servings of fruits and vegetables each day.    Eat whole-grain bread, whole-wheat pasta and brown rice instead of white grains and rice.    Talk to your provider about Calcium and Vitamin D.     Lifestyle    Exercise at least 150 minutes a week (30 minutes a day, 5 days a week). This will help you control your weight and prevent disease.    Limit alcohol to one drink per day.    No smoking.     Wear sunscreen to prevent skin cancer.     See your dentist every six months for an exam and cleaning.    See your eye doctor every 1 to 2 years.

## 2017-04-19 NOTE — NURSING NOTE
"Chief Complaint   Patient presents with     Physical     Fasting     Refill Request       Initial /89 (BP Location: Left arm, Patient Position: Chair, Cuff Size: Adult Regular)  Pulse 81  Temp 97  F (36.1  C) (Oral)  Ht 5' 3.47\" (1.612 m)  Wt 168 lb 3.2 oz (76.3 kg)  LMP 02/22/2017  SpO2 98%  BMI 29.36 kg/m2 Estimated body mass index is 29.36 kg/(m^2) as calculated from the following:    Height as of this encounter: 5' 3.47\" (1.612 m).    Weight as of this encounter: 168 lb 3.2 oz (76.3 kg).  Medication Reconciliation: complete   Sofie Allen MA      "

## 2017-04-19 NOTE — PROGRESS NOTES
Ms. Arellano,    Your LDL (bad cholesterol)  was at goal. Your HDL (good cholesterol) was normal.  This is good. Your triglycerides were above normal.  Poor diet, genetics and being overweight can contribute to this.  1000mg daily of omega-3 fatty acids may improve this. Maintaining a healthy diet with lean proteins, whole grains and healthy fats such as olive oil as well as regular exercise and maintaining an appropriate weight all contribute to healthier cholesterol levels.  Your liver function tests are normal.  Your kidney function was normal.  This should be rechecked every 12 months.    Please contact the clinic if you have additional questions.  Thank you.    Sincerely,    Lynda Painting

## 2017-04-20 ASSESSMENT — ASTHMA QUESTIONNAIRES: ACT_TOTALSCORE: 24

## 2017-04-24 LAB
COPATH REPORT: NORMAL
PAP: NORMAL

## 2017-04-25 LAB
FINAL DIAGNOSIS: ABNORMAL
HPV HR 12 DNA CVX QL NAA+PROBE: POSITIVE
HPV16 DNA SPEC QL NAA+PROBE: NEGATIVE
HPV18 DNA SPEC QL NAA+PROBE: NEGATIVE
SPECIMEN DESCRIPTION: ABNORMAL

## 2017-04-26 NOTE — PROGRESS NOTES
Given history and negative 16/18, I would recommend pap with HPV testing in 1 year.    Lynda Floyd M.D.

## 2017-05-30 ENCOUNTER — MYC MEDICAL ADVICE (OUTPATIENT)
Dept: FAMILY MEDICINE | Facility: CLINIC | Age: 52
End: 2017-05-30

## 2017-05-30 DIAGNOSIS — K21.9 GASTROESOPHAGEAL REFLUX DISEASE WITHOUT ESOPHAGITIS: ICD-10-CM

## 2017-05-31 RX ORDER — OMEPRAZOLE 40 MG/1
40 CAPSULE, DELAYED RELEASE ORAL DAILY
Qty: 90 CAPSULE | Refills: 3 | Status: SHIPPED | OUTPATIENT
Start: 2017-05-31 | End: 2018-06-10

## 2017-10-16 ENCOUNTER — OFFICE VISIT (OUTPATIENT)
Dept: FAMILY MEDICINE | Facility: CLINIC | Age: 52
End: 2017-10-16
Payer: COMMERCIAL

## 2017-10-16 VITALS
HEART RATE: 94 BPM | WEIGHT: 159 LBS | BODY MASS INDEX: 27.75 KG/M2 | OXYGEN SATURATION: 97 % | TEMPERATURE: 97.1 F | DIASTOLIC BLOOD PRESSURE: 78 MMHG | SYSTOLIC BLOOD PRESSURE: 136 MMHG

## 2017-10-16 DIAGNOSIS — J45.30 MILD PERSISTENT ASTHMA WITHOUT COMPLICATION: ICD-10-CM

## 2017-10-16 DIAGNOSIS — Z11.3 SCREEN FOR STD (SEXUALLY TRANSMITTED DISEASE): ICD-10-CM

## 2017-10-16 DIAGNOSIS — I10 HYPERTENSION GOAL BP (BLOOD PRESSURE) < 140/90: ICD-10-CM

## 2017-10-16 DIAGNOSIS — N76.0 BACTERIAL VAGINOSIS: ICD-10-CM

## 2017-10-16 DIAGNOSIS — B96.89 BACTERIAL VAGINOSIS: ICD-10-CM

## 2017-10-16 DIAGNOSIS — N89.8 VAGINAL DISCHARGE: Primary | ICD-10-CM

## 2017-10-16 LAB
ANION GAP SERPL CALCULATED.3IONS-SCNC: 11 MMOL/L (ref 3–14)
BUN SERPL-MCNC: 13 MG/DL (ref 7–30)
CALCIUM SERPL-MCNC: 8.6 MG/DL (ref 8.5–10.1)
CHLORIDE SERPL-SCNC: 104 MMOL/L (ref 94–109)
CO2 SERPL-SCNC: 23 MMOL/L (ref 20–32)
CREAT SERPL-MCNC: 0.82 MG/DL (ref 0.52–1.04)
GFR SERPL CREATININE-BSD FRML MDRD: 73 ML/MIN/1.7M2
GLUCOSE SERPL-MCNC: 161 MG/DL (ref 70–99)
POTASSIUM SERPL-SCNC: 3.8 MMOL/L (ref 3.4–5.3)
SODIUM SERPL-SCNC: 138 MMOL/L (ref 133–144)
SPECIMEN SOURCE: ABNORMAL
WET PREP SPEC: ABNORMAL

## 2017-10-16 PROCEDURE — 86780 TREPONEMA PALLIDUM: CPT | Performed by: FAMILY MEDICINE

## 2017-10-16 PROCEDURE — 99214 OFFICE O/P EST MOD 30 MIN: CPT | Performed by: FAMILY MEDICINE

## 2017-10-16 PROCEDURE — 87389 HIV-1 AG W/HIV-1&-2 AB AG IA: CPT | Performed by: FAMILY MEDICINE

## 2017-10-16 PROCEDURE — 36415 COLL VENOUS BLD VENIPUNCTURE: CPT | Performed by: FAMILY MEDICINE

## 2017-10-16 PROCEDURE — 80048 BASIC METABOLIC PNL TOTAL CA: CPT | Performed by: FAMILY MEDICINE

## 2017-10-16 PROCEDURE — 87340 HEPATITIS B SURFACE AG IA: CPT | Performed by: FAMILY MEDICINE

## 2017-10-16 PROCEDURE — 86803 HEPATITIS C AB TEST: CPT | Performed by: FAMILY MEDICINE

## 2017-10-16 PROCEDURE — 87591 N.GONORRHOEAE DNA AMP PROB: CPT | Performed by: FAMILY MEDICINE

## 2017-10-16 PROCEDURE — 87491 CHLMYD TRACH DNA AMP PROBE: CPT | Performed by: FAMILY MEDICINE

## 2017-10-16 PROCEDURE — 87210 SMEAR WET MOUNT SALINE/INK: CPT | Performed by: FAMILY MEDICINE

## 2017-10-16 RX ORDER — IPRATROPIUM BROMIDE AND ALBUTEROL SULFATE 2.5; .5 MG/3ML; MG/3ML
1 SOLUTION RESPIRATORY (INHALATION) EVERY 4 HOURS PRN
Qty: 30 VIAL | Refills: 1 | Status: CANCELLED | OUTPATIENT
Start: 2017-10-16

## 2017-10-16 RX ORDER — ALBUTEROL SULFATE 0.83 MG/ML
1 SOLUTION RESPIRATORY (INHALATION) EVERY 4 HOURS PRN
Qty: 30 VIAL | Refills: 1 | Status: CANCELLED | OUTPATIENT
Start: 2017-10-16

## 2017-10-16 RX ORDER — METRONIDAZOLE 7.5 MG/G
1 GEL VAGINAL AT BEDTIME
Qty: 70 G | Refills: 0 | Status: SHIPPED | OUTPATIENT
Start: 2017-10-16 | End: 2017-10-21

## 2017-10-16 NOTE — PROGRESS NOTES
Ms. Arellano,    Bacterial vaginosis was confirmed.  I have sent the vaginal cream to your pharmacy as we discussed at your visit.    Please contact the clinic if you have additional questions.  Thank you.    Sincerely,    Lynda Painting

## 2017-10-16 NOTE — PATIENT INSTRUCTIONS
Based on your medical history and these are the current health maintenance or preventive care services that you are due for (some may have been done at this visit)  Health Maintenance Due   Topic Date Due     EYE EXAM Q1 YEAR  11/14/2015     COLON CANCER SCREEN (SYSTEM ASSIGNED)  02/01/2017     ASTHMA ACTION PLAN Q1 YR  03/07/2017     INFLUENZA VACCINE (SYSTEM ASSIGNED)  09/01/2017     BMP Q6 MOS  10/12/2017     ASTHMA CONTROL TEST Q6 MOS  10/19/2017         At [facility], we strive to deliver an exceptional experience to you, every time we see you.    If you receive a survey in the mail, please send us back your thoughts. We really do value your feedback.    Your care team's suggested websites for health information:  Www.[employer].org : Up to date and easily searchable information on multiple topics.  Www.medlineplus.gov : medication info, interactive tutorials, watch real surgeries online  Www.familydoctor.org : good info from the Academy of Family Physicians  Www.cdc.gov : public health info, travel advisories, epidemics (H1N1)  Www.aap.org : children's health info, normal development, vaccinations  Www.health.[facility].[place].us : [place] dept of health, public health issues in [place], N1N1    How to contact your care team:   Team Ema/Spirit [phone]         Pharmacy [phone]    Dr. Floyd, Lesly White PA-C, Dr. Pope, Janice GALEANO CNP, Shavon Lundberg PA-C, Dr. Jones, and WALESKA Taylor CNP    Team RN: Adelaida      Clinic hours  M-Th 7 am-7 pm   Fri 7 am-5 pm.   Urgent care M-F 11 am-9 pm,   Sat/Sun 9 am-5 pm.  Pharmacy M-Th 8 am-8 pm Fri 8 am-6 pm  Sat/Sun 9 am-5 pm.     All password changes, disabled accounts, or ID changes in [employer]/MyHealth will be done by our Access Services Department.    If you need help with your account or password, call: [phone]. Clinic staff no longer has the ability to change passwords.

## 2017-10-16 NOTE — PROGRESS NOTES
SUBJECTIVE:   Kylie Arellano is a 52 year old female who presents to clinic today for the following health issues:      Vaginal Symptoms  Onset:  A week    Description:  Vaginal Discharge: clear   Itching (Pruritis): no   Burning sensation:  no   Odor: YES    Accompanying Signs & Symptoms:  Pain with Urination: no   Abdominal Pain: YES  Fever: no     History:   Sexually active: YES  New Partner: YES  Possibility of Pregnancy:  No    Precipitating factors:   Recent Antibiotic Use: no     Alleviating factors:  na    Therapies Tried and outcome: na      Asthma - doing very well this year.  No recent nebu or other rescue medication usage.    Vaginal discharge - had a recent break up and vaginal odor noted.      HTN - taking medications as directed without side effects.    Problem list and histories reviewed & adjusted, as indicated.  Additional history: as documented    Patient Active Problem List   Diagnosis     Mild persistent asthma     Seasonal allergic rhinitis     GERD (gastroesophageal reflux disease)     Acne     CARDIOVASCULAR SCREENING; LDL GOAL LESS THAN 160     Hypertension goal BP (blood pressure) < 140/90     S/P LEEP (status post loop electrosurgical excision procedure)- АЛЕКСАНДР 2/3     Atopic rhinitis     Past Surgical History:   Procedure Laterality Date     BUNIONECTOMY RT/LT  1985    bilateral surgeries, two separate surgeries     CONIZATION LEEP  2010     SINUS SURGERY      multiple surgeries     SURGICAL HISTORY OF -   2003    Breast augmentation     TONSILLECTOMY  1982       Social History   Substance Use Topics     Smoking status: Never Smoker     Smokeless tobacco: Never Used      Comment: no second hand smoke     Alcohol use 0.0 oz/week     0 Standard drinks or equivalent per week      Comment: occassional     Family History   Problem Relation Age of Onset     Connective Tissue Disorder Mother      autoimmune disorder that affects muscles     CANCER Maternal Grandfather      DIABETES Paternal  Grandfather      Hypertension No family hx of      CEREBROVASCULAR DISEASE No family hx of      Thyroid Disease No family hx of      Glaucoma No family hx of      Macular Degeneration No family hx of              Reviewed and updated as needed this visit by clinical staffTobacco  Allergies  Meds  Problems  Med Hx  Surg Hx  Fam Hx  Soc Hx        Reviewed and updated as needed this visit by Provider  Allergies  Meds  Problems         ROS:  Constitutional, HEENT, cardiovascular, pulmonary, gi and gu systems are negative, except as otherwise noted.      OBJECTIVE:   /78 (BP Location: Right arm, Patient Position: Chair, Cuff Size: Adult Regular)  Pulse 94  Temp 97.1  F (36.2  C) (Oral)  Wt 159 lb (72.1 kg)  SpO2 97%  Breastfeeding? No  BMI 27.75 kg/m2  Body mass index is 27.75 kg/(m^2).  GENERAL: healthy, alert and no distress  NECK: no adenopathy, no asymmetry, masses, or scars and thyroid normal to palpation  RESP: lungs clear to auscultation - no rales, rhonchi or wheezes  CV: regular rate and rhythm, normal S1 S2, no S3 or S4, no murmur, click or rub, no peripheral edema and peripheral pulses strong  ABDOMEN: soft, nontender, no hepatosplenomegaly, no masses and bowel sounds normal  MS: no gross musculoskeletal defects noted, no edema    Diagnostic Test Results:  No results found for this or any previous visit (from the past 24 hour(s)).    ASSESSMENT/PLAN:     1. Vaginal discharge  Screening for vaginal complaints and if bacterial vaginosis is confirmed, discussed treatment and patient opted for vaginal gel  - Chlamydia trachomatis PCR  - Neisseria gonorrhoeae PCR  - Hepatitis C antibody  - Hepatitis B surface antigen  - HIV Antigen Antibody Combo  - Anti Treponema  - Wet prep    2. Screen for STD (sexually transmitted disease)  Screening  - Chlamydia trachomatis PCR  - Neisseria gonorrhoeae PCR  - Wet prep    3. Mild persistent asthma without complication  Stable - continue current  treatment    4. Hypertension goal BP (blood pressure) < 140/90  Controlled - continue current treatment and check labs.  - BASIC METABOLIC PANEL    The uses and side effects, including black box warnings as appropriate, were discussed in detail.  All patient questions were answered.  The patient was instructed to call immediately if any side effects developed.     FUTURE APPOINTMENTS:       - Follow-up visit as needed or based on labs.    Lynda Painting MD  [facility]

## 2017-10-16 NOTE — MR AVS SNAPSHOT
After Visit Summary   10/16/2017    Kylie Arellano    MRN: [id number]           Patient Information     Date Of Birth          1965        Visit Information        Provider Department      10/16/2017 3:00 PM Lynda Goins MD [facility]        Today's Diagnoses     Screen for colon cancer    -  1    Acute bronchitis        Mild persistent asthma          Care Instructions    Based on your medical history and these are the current health maintenance or preventive care services that you are due for (some may have been done at this visit)  Health Maintenance Due   Topic Date Due     EYE EXAM Q1 YEAR  11/14/2015     COLON CANCER SCREEN (SYSTEM ASSIGNED)  02/01/2017     ASTHMA ACTION PLAN Q1 YR  03/07/2017     INFLUENZA VACCINE (SYSTEM ASSIGNED)  09/01/2017     BMP Q6 MOS  10/12/2017     ASTHMA CONTROL TEST Q6 MOS  10/19/2017         At [facility], we strive to deliver an exceptional experience to you, every time we see you.    If you receive a survey in the mail, please send us back your thoughts. We really do value your feedback.    Your care team's suggested websites for health information:  Www.[employer].org : Up to date and easily searchable information on multiple topics.  Www.medlineplus.gov : medication info, interactive tutorials, watch real surgeries online  Www.familydoctor.org : good info from the Academy of Family Physicians  Www.cdc.gov : public health info, travel advisories, epidemics (H1N1)  Www.aap.org : children's health info, normal development, vaccinations  Www.health.[facility].[place].us : [place] dept of health, public health issues in [place], N1N1    How to contact your care team:   Team [employer] [phone]         Pharmacy [phone]    Dr. Floyd, Lesly White PA-C, Dr. Pope, Janice Ceron APRN CNP, Shavon Lundberg PA-C, Dr. Jones, and WALESKA Taylor CNP    Team RN: Adelaida      [facility] hours  M-Th 7 am-7 pm    Fri 7 am-5 pm.   Urgent care M-F 11 am-9 pm,   Sat/Sun 9 am-5 pm.  Pharmacy M-Th 8 am-8 pm Fri 8 am-6 pm  Sat/Sun 9 am-5 pm.     All password changes, disabled accounts, or ID changes in [employer]My[employer] will be done by our Access Services Department.    If you need help with your account or password, call: [phone]. Clinic staff no longer has the ability to change passwords.             Follow-ups after your visit        Your next 10 appointments already scheduled     Oct 16, 2017  3:00 PM CDT   Office Visit with Lynda Painting MD   [facility]    [address] [place] [address] [phone]           Bring a current list of meds and any records pertaining to this visit. For Physicals, please bring immunization records and any forms needing to be filled out. Please arrive 10 minutes early to complete paperwork.              Who to contact     If you have questions or need follow up information about today's clinic visit or your schedule please contact [facility] directly at [phone].  Normal or non-critical lab and imaging results will be communicated to you by [employer], letter or phone within 4 business days after the clinic has received the results. If you do not hear from us within 7 days, please contact the clinic through [employer] or phone. If you have a critical or abnormal lab result, we will notify you by phone as soon as possible.  Submit refill requests through [employer] or call your pharmacy and they will forward the refill request to us. Please allow 3 business days for your refill to be completed.          Additional Information About Your Visit        [employer] Information     [employer] gives you secure access to your electronic health record. If you see a primary care provider, you can also send messages to your care team and make appointments. If you have questions, please call your  primary care clinic.  If you do not have a primary care provider, please call [phone] and they will assist you.        Care EveryWhere ID     This is your Care EveryWhere ID. This could be used by other organizations to access your [place] medical records  [id number]        Your Vitals Were     Pulse Temperature Pulse Oximetry Breastfeeding? BMI (Body Mass Index)       94 97.1  F (36.2  C) (Oral) 97% No 27.75 kg/m2        Blood Pressure from Last 3 Encounters:   10/16/17 136/78   04/19/17 128/89   09/27/16 (!) 134/94    Weight from Last 3 Encounters:   10/16/17 159 lb (72.1 kg)   04/19/17 168 lb 3.2 oz (76.3 kg)   09/27/16 166 lb (75.3 kg)              Today, you had the following     No orders found for display       Primary Care Provider Office Phone # Fax #    Lynda Destiny Painting [phone]       [address]  [facility] [address]        Equal Access to Services     [facility]: Hadii aad ku hadasho Soomaali, waaxda luqadaha, qaybta kaalmada adeegyada, waxay adityain hayannalisa messer . So [facility] [phone].    ATENCIÓN: Si habla español, tiene a new disposición servicios gratuitos de asistencia lingüística. Faye al [phone].    We comply with applicable federal civil rights laws and [place] laws. We do not discriminate on the basis of race, color, national origin, age, disability, sex, sexual orientation, or gender identity.            Thank you!     Thank you for choosing [facility]  for your care. Our goal is always to provide you with excellent care. Hearing back from our patients is one way we can continue to improve our services. Please take a few minutes to complete the written survey that you may receive in the mail after your visit with us. Thank you!             Your Updated Medication List - Protect others around you: Learn how to safely use, store and throw away your medicines at www.disposemymeds.org.          This  list is accurate as of: 10/16/17  2:57 PM.  Always use your most recent med list.                   Brand Name Dispense Instructions for use Diagnosis    acyclovir 5 % ointment    ZOVIRAX    30 g    Apply  topically 5 times daily. As needed for cold sores.    Recurrent cold sores       ADVIL PO           desonide 0.05 % cream    DESOWEN    60 g    Apply sparingly to Legs, up to twice daily as needed.    Intrinsic atopic dermatitis       fluticasone 50 MCG/ACT spray    FLONASE          ipratropium - albuterol 0.5 mg/2.5 mg/3 mL 0.5-2.5 (3) MG/3ML neb solution    DUONEB    30 vial    Take 1 vial (3 mLs) by nebulization every 4 hours as needed for shortness of breath / dyspnea or wheezing    Acute bronchitis       levocetirizine 5 MG tablet    XYZAL    30 tablet    Take 1 tablet by mouth every evening.    Mild persistent asthma       levonorgestrel-ethinyl estradiol per tablet    TRIVORA (28)    112 tablet    One tablet continuously x 3 months, then off one week, repeat cycle x one year    Encounter for surveillance of contraceptive pills       losartan 100 MG tablet    COZAAR    90 tablet    Take 1 tablet (100 mg) by mouth daily    Hypertension goal BP (blood pressure) < 140/90       mometasone 220 MCG/INH Inhaler    ASMANEX 120 METERED DOSES    1 Inhaler    Inhale 2 puffs into the lungs daily.    Mild persistent asthma       montelukast 10 MG tablet    SINGULAIR          omeprazole 40 MG capsule    priLOSEC    90 capsule    Take 1 capsule (40 mg) by mouth daily    Gastroesophageal reflux disease without esophagitis       PATADAY 0.2 % Soln   Generic drug:  olopatadine HCl      Apply 1 drop to eye as needed. Each eye    HTN (hypertension)       SEREVENT DISKUS 50 MCG/DOSE diskus inhaler   Generic drug:  salmeterol           valACYclovir 500 MG tablet    VALTREX    135 tablet    TAKE 1 TABLET BY MOUTH EVERY DAY AND INCREASE TO 2 TABLETS DAILY FOR 10 DAYS WITH OUTBREAKS    Recurrent cold sores       * VENTOLIN   (90 BASE) MCG/ACT Inhaler   Generic drug:  albuterol      as needed        * albuterol (2.5 MG/3ML) 0.083% neb solution     30 vial    Take 3 mLs by nebulization every 4 hours as needed for shortness of breath / dyspnea.    Mild persistent asthma       * Notice:  This list has 2 medication(s) that are the same as other medications prescribed for you. Read the directions carefully, and ask your doctor or other care provider to review them with you.

## 2017-10-17 LAB
C TRACH DNA SPEC QL NAA+PROBE: NEGATIVE
HBV SURFACE AG SERPL QL IA: NONREACTIVE
HCV AB SERPL QL IA: NONREACTIVE
HIV 1+2 AB+HIV1 P24 AG SERPL QL IA: NONREACTIVE
N GONORRHOEA DNA SPEC QL NAA+PROBE: NEGATIVE
SPECIMEN SOURCE: NORMAL
SPECIMEN SOURCE: NORMAL
T PALLIDUM IGG+IGM SER QL: NEGATIVE

## 2017-10-17 ASSESSMENT — ASTHMA QUESTIONNAIRES: ACT_TOTALSCORE: 25

## 2017-10-17 NOTE — PROGRESS NOTES
Ms. Arellano,    Neither hepatitis B, hepatitis C, HIV, syphilis, gonorrhea nor chlamydia were found.  Your kidney function is stable.    Please contact the clinic if you have additional questions.  Thank you.    Sincerely,    Lynda Painting

## 2017-10-26 ENCOUNTER — OFFICE VISIT (OUTPATIENT)
Dept: URGENT CARE | Facility: URGENT CARE | Age: 52
End: 2017-10-26
Payer: COMMERCIAL

## 2017-10-26 VITALS
DIASTOLIC BLOOD PRESSURE: 106 MMHG | WEIGHT: 153.6 LBS | SYSTOLIC BLOOD PRESSURE: 156 MMHG | OXYGEN SATURATION: 97 % | BODY MASS INDEX: 26.81 KG/M2 | HEART RATE: 91 BPM | TEMPERATURE: 98.4 F

## 2017-10-26 DIAGNOSIS — J45.21 MILD INTERMITTENT ASTHMA WITH EXACERBATION: ICD-10-CM

## 2017-10-26 DIAGNOSIS — J06.9 UPPER RESPIRATORY TRACT INFECTION, UNSPECIFIED TYPE: Primary | ICD-10-CM

## 2017-10-26 PROCEDURE — 99213 OFFICE O/P EST LOW 20 MIN: CPT | Performed by: PHYSICIAN ASSISTANT

## 2017-10-26 RX ORDER — METRONIDAZOLE 7.5 MG/G
GEL VAGINAL
Refills: 0 | COMMUNITY
Start: 2017-10-16 | End: 2017-11-02

## 2017-10-26 RX ORDER — PREDNISONE 20 MG/1
40 TABLET ORAL DAILY
Qty: 10 TABLET | Refills: 0 | Status: SHIPPED | OUTPATIENT
Start: 2017-10-26 | End: 2017-10-31

## 2017-10-26 NOTE — NURSING NOTE
"Chief Complaint   Patient presents with     Cough       Initial BP (!) 156/106 (BP Location: Left arm, Patient Position: Chair, Cuff Size: Adult Regular)  Pulse 91  Temp 98.4  F (36.9  C) (Oral)  Wt 153 lb 9.6 oz (69.7 kg)  SpO2 97%  BMI 26.81 kg/m2 Estimated body mass index is 26.81 kg/(m^2) as calculated from the following:    Height as of 4/19/17: 5' 3.47\" (1.612 m).    Weight as of this encounter: 153 lb 9.6 oz (69.7 kg).  Medication Reconciliation: complete         Anabel Smith    "

## 2017-10-26 NOTE — MR AVS SNAPSHOT
After Visit Summary   10/26/2017    Kylie Arellano    MRN: [id number]           Patient Information     Date Of Birth          1965        Visit Information        Provider Department      10/26/2017 3:40 PM Abiola Treadwell PA-C [facility]        Today's Diagnoses     Upper respiratory tract infection, unspecified type    -  1    Mild intermittent asthma with exacerbation           Follow-ups after your visit        Who to contact     If you have questions or need follow up information about today's clinic visit or your schedule please contact [facility] directly at [phone].  Normal or non-critical lab and imaging results will be communicated to you by [employer], letter or phone within 4 business days after the clinic has received the results. If you do not hear from us within 7 days, please contact the clinic through [employer] or phone. If you have a critical or abnormal lab result, we will notify you by phone as soon as possible.  Submit refill requests through [employer] or call your pharmacy and they will forward the refill request to us. Please allow 3 business days for your refill to be completed.          Additional Information About Your Visit        MyChart Information     [employer] gives you secure access to your electronic health record. If you see a primary care provider, you can also send messages to your care team and make appointments. If you have questions, please call your primary care clinic.  If you do not have a primary care provider, please call [phone] and they will assist you.        Care EveryWhere ID     This is your Care EveryWhere ID. This could be used by other organizations to access your [place] medical records  [id number]        Your Vitals Were     Pulse Temperature Pulse Oximetry BMI (Body Mass Index)          91 98.4  F (36.9  C) (Oral) 97% 26.81 kg/m2         Blood Pressure from Last 3 Encounters:   10/26/17  (!) 156/106   10/16/17 136/78   04/19/17 128/89    Weight from Last 3 Encounters:   10/26/17 153 lb 9.6 oz (69.7 kg)   10/16/17 159 lb (72.1 kg)   04/19/17 168 lb 3.2 oz (76.3 kg)              Today, you had the following     No orders found for display         Today's Medication Changes          These changes are accurate as of: 10/26/17  4:34 PM.  If you have any questions, ask your nurse or doctor.               Start taking these medicines.        Dose/Directions    predniSONE 20 MG tablet   Commonly known as:  DELTASONE   Used for:  Mild intermittent asthma with exacerbation, Upper respiratory tract infection, unspecified type   Started by:  Abiola Treadwell PA-C        Dose:  40 mg   Take 2 tablets (40 mg) by mouth daily for 5 days   Quantity:  10 tablet   Refills:  0            Where to get your medicines      These medications were sent to [id number] - [place], [place] - [address] [address]     Phone:  [phone]     predniSONE 20 MG tablet                Primary Care Provider Office Phone # Fax #    Lynda Destiny Painting [phone]       [address]  [facility] [address]        Equal Access to Services     [facility]: Hadii laura ku hadasho Soomaali, waaxda luqadaha, qaybta kaalmada adeegyada, waxay idiin hayaan yony cordero. So [facility] [phone].    ATENCIÓN: Si habla español, tiene a new disposición servicios gratuitos de asistencia lingüística. Llame al [phone].    We comply with applicable federal civil rights laws and [place] laws. We do not discriminate on the basis of race, color, national origin, age, disability, sex, sexual orientation, or gender identity.            Thank you!     Thank you for choosing [facility]  for your care. Our goal is always to provide you with excellent care. Hearing back from our patients is one way we can continue to improve our  services. Please take a few minutes to complete the written survey that you may receive in the mail after your visit with us. Thank you!             Your Updated Medication List - Protect others around you: Learn how to safely use, store and throw away your medicines at www.disposemymeds.org.          This list is accurate as of: 10/26/17  4:34 PM.  Always use your most recent med list.                   Brand Name Dispense Instructions for use Diagnosis    acyclovir 5 % ointment    ZOVIRAX    30 g    Apply  topically 5 times daily. As needed for cold sores.    Recurrent cold sores       ADVIL PO           desonide 0.05 % cream    DESOWEN    60 g    Apply sparingly to Legs, up to twice daily as needed.    Intrinsic atopic dermatitis       fluticasone 50 MCG/ACT spray    FLONASE          ipratropium - albuterol 0.5 mg/2.5 mg/3 mL 0.5-2.5 (3) MG/3ML neb solution    DUONEB    30 vial    Take 1 vial (3 mLs) by nebulization every 4 hours as needed for shortness of breath / dyspnea or wheezing    Acute bronchitis       levocetirizine 5 MG tablet    XYZAL    30 tablet    Take 1 tablet by mouth every evening.    Mild persistent asthma       levonorgestrel-ethinyl estradiol per tablet    TRIVORA (28)    112 tablet    One tablet continuously x 3 months, then off one week, repeat cycle x one year    Encounter for surveillance of contraceptive pills       losartan 100 MG tablet    COZAAR    90 tablet    Take 1 tablet (100 mg) by mouth daily    Hypertension goal BP (blood pressure) < 140/90       metroNIDAZOLE 0.75 % vaginal gel    METROGEL          mometasone 220 MCG/INH Inhaler    ASMANEX 120 METERED DOSES    1 Inhaler    Inhale 2 puffs into the lungs daily.    Mild persistent asthma       montelukast 10 MG tablet    SINGULAIR          omeprazole 40 MG capsule    priLOSEC    90 capsule    Take 1 capsule (40 mg) by mouth daily    Gastroesophageal reflux disease without esophagitis       PATADAY 0.2 % Soln   Generic drug:   olopatadine HCl      Apply 1 drop to eye as needed. Each eye    HTN (hypertension)       predniSONE 20 MG tablet    DELTASONE    10 tablet    Take 2 tablets (40 mg) by mouth daily for 5 days    Mild intermittent asthma with exacerbation, Upper respiratory tract infection, unspecified type       SEREVENT DISKUS 50 MCG/DOSE diskus inhaler   Generic drug:  salmeterol           valACYclovir 500 MG tablet    VALTREX    135 tablet    TAKE 1 TABLET BY MOUTH EVERY DAY AND INCREASE TO 2 TABLETS DAILY FOR 10 DAYS WITH OUTBREAKS    Recurrent cold sores       * VENTOLIN  (90 BASE) MCG/ACT Inhaler   Generic drug:  albuterol      as needed        * albuterol (2.5 MG/3ML) 0.083% neb solution     30 vial    Take 3 mLs by nebulization every 4 hours as needed for shortness of breath / dyspnea.    Mild persistent asthma       * Notice:  This list has 2 medication(s) that are the same as other medications prescribed for you. Read the directions carefully, and ask your doctor or other care provider to review them with you.

## 2017-10-26 NOTE — PROGRESS NOTES
SUBJECTIVE:   Kylie Arellano is a 52 year old female who presents to clinic today for the following health issues:      Cough      Duration: couple days    Description (location/character/radiation): chest, throat    Intensity:  moderate    Accompanying signs and symptoms: cough, wheezing, body aches, headaches    History (similar episodes/previous evaluation): None    Precipitating or alleviating factors: None    Therapies tried and outcome: asthma medicine     She has asthma and allergies  She has a history of bronchitis and sometimes pneumonia  Starting to hurt with coughing  She had chills and body aches earlier this week  She also has headaches  She takes daily asthma meds and Advil  No nausea, vomiting, and diarrhea  She does not feel short of breath, just a dull pain with a deep breath   Productive cough, loosing up some since Monday     Problem list and histories reviewed & adjusted, as indicated.  Additional history: as documented    Patient Active Problem List   Diagnosis     Mild persistent asthma     Seasonal allergic rhinitis     GERD (gastroesophageal reflux disease)     Acne     CARDIOVASCULAR SCREENING; LDL GOAL LESS THAN 160     Hypertension goal BP (blood pressure) < 140/90     S/P LEEP (status post loop electrosurgical excision procedure)- АЛЕКСАНДР 2/3     Atopic rhinitis     Past Surgical History:   Procedure Laterality Date     BUNIONECTOMY RT/LT  1985    bilateral surgeries, two separate surgeries     CONIZATION LEEP  2010     SINUS SURGERY      multiple surgeries     SURGICAL HISTORY OF -   2003    Breast augmentation     TONSILLECTOMY  1982       Social History   Substance Use Topics     Smoking status: Never Smoker     Smokeless tobacco: Never Used      Comment: no second hand smoke     Alcohol use 0.0 oz/week     0 Standard drinks or equivalent per week      Comment: occassional     Family History   Problem Relation Age of Onset     Connective Tissue Disorder Mother      autoimmune disorder  that affects muscles     CANCER Maternal Grandfather      DIABETES Paternal Grandfather      Hypertension No family hx of      CEREBROVASCULAR DISEASE No family hx of      Thyroid Disease No family hx of      Glaucoma No family hx of      Macular Degeneration No family hx of          Current Outpatient Prescriptions   Medication Sig Dispense Refill     metroNIDAZOLE (METROGEL) 0.75 % vaginal gel   0     omeprazole (PRILOSEC) 40 MG capsule Take 1 capsule (40 mg) by mouth daily 90 capsule 3     valACYclovir (VALTREX) 500 MG tablet TAKE 1 TABLET BY MOUTH EVERY DAY AND INCREASE TO 2 TABLETS DAILY FOR 10 DAYS WITH OUTBREAKS 135 tablet 3     losartan (COZAAR) 100 MG tablet Take 1 tablet (100 mg) by mouth daily 90 tablet 3     desonide (DESOWEN) 0.05 % cream Apply sparingly to Legs, up to twice daily as needed. 60 g 2     levonorgestrel-ethinyl estradiol (TRIVORA, 28,) per tablet One tablet continuously x 3 months, then off one week, repeat cycle x one year 112 tablet 4     acyclovir (ZOVIRAX) 5 % ointment Apply  topically 5 times daily. As needed for cold sores. 30 g 2     SEREVENT DISKUS 50 MCG/DOSE diskus inhaler   2     montelukast (SINGULAIR) 10 MG tablet   2     fluticasone (FLONASE) 50 MCG/ACT nasal spray   1     Ibuprofen (ADVIL PO)        ipratropium - albuterol 0.5 mg/2.5 mg/3 mL (DUONEB) 0.5-2.5 (3) MG/3ML nebulization Take 1 vial (3 mLs) by nebulization every 4 hours as needed for shortness of breath / dyspnea or wheezing 30 vial 1     albuterol (2.5 MG/3ML) 0.083% nebulizer solution Take 3 mLs by nebulization every 4 hours as needed for shortness of breath / dyspnea. 30 vial 1     mometasone (ASMANEX 120 METERED DOSES) 220 MCG/INH inhaler Inhale 2 puffs into the lungs daily. 1 Inhaler 0     levocetirizine (XYZAL) 5 MG tablet Take 1 tablet by mouth every evening. 30 tablet 12     Olopatadine HCl (PATADAY) 0.2 % SOLN Apply 1 drop to eye as needed. Each eye       VENTOLIN  (90 BASE) MCG/ACT IN AERS as  needed       No Known Allergies      Reviewed and updated as needed this visit by clinical staff     Reviewed and updated as needed this visit by Provider         ROS:  As in HPI      OBJECTIVE:     BP (!) 156/106 (BP Location: Left arm, Patient Position: Chair, Cuff Size: Adult Regular)  Pulse 91  Temp 98.4  F (36.9  C) (Oral)  Wt 153 lb 9.6 oz (69.7 kg)  SpO2 97%  BMI 26.81 kg/m2  Body mass index is 26.81 kg/(m^2).  GENERAL: healthy, alert and no distress  EYES: Eyes grossly normal to inspection, PERRL and conjunctivae and sclerae normal  HENT: ear canals and TM's normal, nose and mouth without ulcers or lesions  NECK: no adenopathy, no asymmetry, masses, or scars and thyroid normal to palpation  RESP: expiratory wheezes throughout and inspiratory wheezes throughout  CV: regular rate and rhythm, normal S1 S2, no S3 or S4, no murmur, click or rub, no peripheral edema and peripheral pulses strong  MS: no gross musculoskeletal defects noted, no edema  SKIN: no suspicious lesions or rashes  NEURO: Normal strength and tone, mentation intact and speech normal    Diagnostic Test Results:  none     ASSESSMENT/PLAN:     1. Upper respiratory tract infection, unspecified type  - If she develops a fever or worsening symptoms, we would consider a chest XR  - predniSONE (DELTASONE) 20 MG tablet; Take 2 tablets (40 mg) by mouth daily for 5 days  Dispense: 10 tablet; Refill: 0    2. Mild intermittent asthma with exacerbation  - predniSONE (DELTASONE) 20 MG tablet; Take 2 tablets (40 mg) by mouth daily for 5 days  Dispense: 10 tablet; Refill: 0  Nebulizer treatments as needed at home     Abiola Treadwell PA-C  [facility]

## 2017-11-02 ENCOUNTER — E-VISIT (OUTPATIENT)
Dept: FAMILY MEDICINE | Facility: CLINIC | Age: 52
End: 2017-11-02
Payer: COMMERCIAL

## 2017-11-02 DIAGNOSIS — N76.0 VAGINOSIS: Primary | ICD-10-CM

## 2017-11-02 PROCEDURE — 99444 ZZC PHYSICIAN ONLINE EVALUATION & MANAGEMENT SERVICE: CPT | Performed by: FAMILY MEDICINE

## 2017-11-02 RX ORDER — METRONIDAZOLE 7.5 MG/G
1 GEL VAGINAL AT BEDTIME
Qty: 70 G | Refills: 0 | Status: SHIPPED | OUTPATIENT
Start: 2017-11-02 | End: 2017-12-05

## 2017-12-05 ENCOUNTER — OFFICE VISIT (OUTPATIENT)
Dept: FAMILY MEDICINE | Facility: CLINIC | Age: 52
End: 2017-12-05
Payer: COMMERCIAL

## 2017-12-05 VITALS
WEIGHT: 156.8 LBS | OXYGEN SATURATION: 98 % | DIASTOLIC BLOOD PRESSURE: 90 MMHG | BODY MASS INDEX: 27.37 KG/M2 | TEMPERATURE: 98.3 F | HEART RATE: 101 BPM | SYSTOLIC BLOOD PRESSURE: 138 MMHG

## 2017-12-05 DIAGNOSIS — J45.901 MODERATE ASTHMA WITH EXACERBATION, UNSPECIFIED WHETHER PERSISTENT: Primary | ICD-10-CM

## 2017-12-05 DIAGNOSIS — M25.561 CHRONIC PAIN OF RIGHT KNEE: ICD-10-CM

## 2017-12-05 DIAGNOSIS — R05.9 COUGH: ICD-10-CM

## 2017-12-05 DIAGNOSIS — G89.29 CHRONIC PAIN OF RIGHT KNEE: ICD-10-CM

## 2017-12-05 PROCEDURE — 99214 OFFICE O/P EST MOD 30 MIN: CPT | Performed by: PHYSICIAN ASSISTANT

## 2017-12-05 RX ORDER — CODEINE PHOSPHATE AND GUAIFENESIN 10; 100 MG/5ML; MG/5ML
1-2 SOLUTION ORAL EVERY 4 HOURS PRN
Qty: 180 ML | Refills: 0 | Status: SHIPPED | OUTPATIENT
Start: 2017-12-05 | End: 2018-09-12

## 2017-12-05 RX ORDER — IPRATROPIUM BROMIDE AND ALBUTEROL SULFATE 2.5; .5 MG/3ML; MG/3ML
1 SOLUTION RESPIRATORY (INHALATION) EVERY 4 HOURS PRN
Qty: 30 VIAL | Refills: 1 | Status: SHIPPED | OUTPATIENT
Start: 2017-12-05 | End: 2018-06-29

## 2017-12-05 RX ORDER — PREDNISONE 10 MG/1
TABLET ORAL
Qty: 20 TABLET | Refills: 0 | Status: SHIPPED | OUTPATIENT
Start: 2017-12-05 | End: 2018-09-12

## 2017-12-05 NOTE — PROGRESS NOTES
SUBJECTIVE:   Kylie Arellano is a 52 year old female who presents to clinic today for the following health issues:      Acute Illness   Acute illness concerns:URI  Onset: 2 days ago    Fever: no    Chills/Sweats: YES    Headache (location?): YES    Sinus Pressure:YES    Conjunctivitis:  no    Ear Pain: YES- ITCHES    Rhinorrhea: YES    Congestion: YES    Sore Throat: YES     Cough: YES    Wheeze: YES    Decreased Appetite: YES    Nausea: no    Vomiting: no    Diarrhea:  YES    Dysuria/Freq.: no    Fatigue/Achiness: YES    Sick/Strep Exposure: no     Therapies Tried and outcome: OTC med.    No alb today.    Hx asthma, was seen end of October for cough, was better on steroidsk but increased after completion and much worse the past 2 days.  Is not always taking her meds BID as directed.      Did get flu vacc.    Also c/o rt KNEE pain for 3 months- posterior, increases with bending, patient often working on bent knees. Does pop, click and sometimes feels like it might give out.        No Known Allergies    Patient Active Problem List   Diagnosis     Moderate persistent asthma without complication     Seasonal allergic rhinitis     GERD (gastroesophageal reflux disease)     Acne     CARDIOVASCULAR SCREENING; LDL GOAL LESS THAN 160     Hypertension goal BP (blood pressure) < 140/90     S/P LEEP (status post loop electrosurgical excision procedure)- АЛЕКСАНДР 2/3     Atopic rhinitis       Past Medical History:   Diagnosis Date     Cervical dysplasia, moderate 10/10    АЛЕКСАНДР 2-3      Cervical high risk HPV (human papillomavirus) test positive 04/19/2017    Neg 16/18     Dermatitis      GERD (gastroesophageal reflux disease)      HSV-1 (herpes simplex virus 1) infection      HTN 2005     Hypertension goal BP (blood pressure) < 140/90 2/10/2011     Mild depression (H)      Mild persistent asthma 2007     S/P LEEP of cervix 10/10     Seasonal allergic rhinitis          Current Outpatient Prescriptions on File Prior to  Visit:  omeprazole (PRILOSEC) 40 MG capsule Take 1 capsule (40 mg) by mouth daily   valACYclovir (VALTREX) 500 MG tablet TAKE 1 TABLET BY MOUTH EVERY DAY AND INCREASE TO 2 TABLETS DAILY FOR 10 DAYS WITH OUTBREAKS   losartan (COZAAR) 100 MG tablet Take 1 tablet (100 mg) by mouth daily   desonide (DESOWEN) 0.05 % cream Apply sparingly to Legs, up to twice daily as needed.   levonorgestrel-ethinyl estradiol (TRIVORA, 28,) per tablet One tablet continuously x 3 months, then off one week, repeat cycle x one year   acyclovir (ZOVIRAX) 5 % ointment Apply  topically 5 times daily. As needed for cold sores.   SEREVENT DISKUS 50 MCG/DOSE diskus inhaler    montelukast (SINGULAIR) 10 MG tablet    fluticasone (FLONASE) 50 MCG/ACT nasal spray    Ibuprofen (ADVIL PO)    ipratropium - albuterol 0.5 mg/2.5 mg/3 mL (DUONEB) 0.5-2.5 (3) MG/3ML nebulization Take 1 vial (3 mLs) by nebulization every 4 hours as needed for shortness of breath / dyspnea or wheezing   albuterol (2.5 MG/3ML) 0.083% nebulizer solution Take 3 mLs by nebulization every 4 hours as needed for shortness of breath / dyspnea.   mometasone (ASMANEX 120 METERED DOSES) 220 MCG/INH inhaler Inhale 2 puffs into the lungs daily.   levocetirizine (XYZAL) 5 MG tablet Take 1 tablet by mouth every evening.   Olopatadine HCl (PATADAY) 0.2 % SOLN Apply 1 drop to eye as needed. Each eye   VENTOLIN  (90 BASE) MCG/ACT IN AERS as needed     No current facility-administered medications on file prior to visit.     Social History   Substance Use Topics     Smoking status: Never Smoker     Smokeless tobacco: Never Used      Comment: no second hand smoke     Alcohol use 0.0 oz/week     0 Standard drinks or equivalent per week      Comment: occassional       Family History   Problem Relation Age of Onset     Connective Tissue Disorder Mother      autoimmune disorder that affects muscles     CANCER Maternal Grandfather      DIABETES Paternal Grandfather      Hypertension No family  hx of      CEREBROVASCULAR DISEASE No family hx of      Thyroid Disease No family hx of      Glaucoma No family hx of      Macular Degeneration No family hx of        ROS:  Consitutional: As above  ENT: As above  Respiratory: As above    OBJECTIVE:  /90 (BP Location: Right arm, Patient Position: Chair, Cuff Size: Adult Regular)  Pulse 101  Temp 98.3  F (36.8  C) (Oral)  Wt 156 lb 12.8 oz (71.1 kg)  SpO2 98%  Breastfeeding? No  BMI 27.37 kg/m2  GENERAL APPEARANCE: healthy, alert and no distress  EYES: conjunctiva clear  HENT:  TMs w/o erythema, effusion or bulging.  Nose and mouth without ulcers, erythema or lesions.  NO tonsillar enlargement erythema or exudates.   NECK: supple, nontender, no lymphadenopathy  RESP: lungs clear to auscultation - no rales, rhonchi or wheezes  CV: regular rates and rhythm, normal S1 S2, no murmur noted  NEURO: awake, alert    R tKNEE_ SUDHEER, nonttp, no masses        ASSESSMENT: Well appearing.    ICD-10-CM    1. Moderate asthma with exacerbation, unspecified whether persistent J45.901 ipratropium - albuterol 0.5 mg/2.5 mg/3 mL (DUONEB) 0.5-2.5 (3) MG/3ML neb solution     predniSONE (DELTASONE) 10 MG tablet   2. Cough R05 guaiFENesin-codeine (ROBITUSSIN AC) 100-10 MG/5ML SOLN solution   3. Chronic pain of right knee M25.561 ORTHOPEDICS ADULT REFERRAL    G89.29          PLAN:  Lots of rest and fluids.  RTC if any worsening symptoms or if not improving.    Ray Mcgarry PA-C

## 2017-12-05 NOTE — MR AVS SNAPSHOT
After Visit Summary   12/5/2017    Kylie Arellano    MRN: [id number]           Patient Information     Date Of Birth          1965        Visit Information        Provider Department      12/5/2017 2:20 PM Moose Mcgarry PA [facility]        Today's Diagnoses     Moderate asthma with exacerbation, unspecified whether persistent    -  1    Cough        Chronic pain of right knee          Care Instructions    At [facility], we strive to deliver an exceptional experience to you, every time we see you.  If you receive a survey in the mail, please send us back your thoughts. We really do value your feedback.    Based on your medical history, these are the current health maintenance/preventive care services that you are due for (some may have been done at this visit.)  Health Maintenance Due   Topic Date Due     EYE EXAM Q1 YEAR  11/14/2015     COLON CANCER SCREEN (SYSTEM ASSIGNED)  02/01/2017     ASTHMA ACTION PLAN Q1 YR  03/07/2017         Suggested websites for health information:  Www.[employer] : Up to date and easily searchable information on multiple topics.  Www.medlineplus.gov : medication info, interactive tutorials, watch real surgeries online  Www.familydoctor.org : good info from the Academy of Family Physicians  Www.cdc.gov : public health info, travel advisories, epidemics (H1N1)  Www.aap.org : children's health info, normal development, vaccinations  Www.health.state.[place].us : [place] dept of health, public health issues in [place], N1N1    Your care team:                            Family Medicine Internal Medicine   MD Shimon Blevins MD Shantel Branch-Fleming, MD Katya Georgiev PA-C Nam Ho, MD Pediatrics   JEANNE Tay, MD Aminata Pham CNP, MD Deborah Mielke, MD Kim Thein, APRN CNP      Clinic hours: Monday - Thursday 7 am-7 pm; Fridays 7 am-5 pm.    Urgent care: Monday - Friday 11 am-9 pm; Saturday and Sunday 9 am-5 pm.  Pharmacy : Monday -Thursday 8 am-8 pm; Friday 8 am-6 pm; Saturday and Sunday 9 am-5 pm.     Clinic: [phone]   Pharmacy: [phone]    Discharge Instructions for Asthma  You have been diagnosed with an asthma attack. With the help of your healthcare provider, you can keep your asthma under control and have less emergency department visits and stays in the hospital.    Managing asthma    Take your asthma medicines exactly as your provider tells you. Do this even if you feel that your athma is under control.    Learn how to monitor your asthma. Some people watch for early changes of symptoms getting worse. Some use a peak flow meter. Your healthcare provider may decide to give you an asthma action plan.    Be sure to always have a quick-relief inhaler with you. If you were given a prescription, make sure you go to a pharmacy to get it filled as soon as possible.  Controlling asthma triggers  Triggers are those things that make your asthma symptoms worse or cause asthma attacks. Many people with asthma have allergies that can be triggers. Your healthcare provider may have you get allergy testing to find out what you are allergic to. This can help you stay away from triggers.  Dust or dust mites are a common asthma trigger. To avoid a dust mites, do the following:    Use dust-proof covers on your mattress and pillows. Wash the sheets and blankets on your bed once a week in very hot water.    Don t sleep or lie on cloth-covered cushions or furniture.    Ask someone else to vacuum and dust your house.    If you do vacuum and dust yourself, wear a dust mask. You can buy them from the [employer] store.    Use a vacuum with a double-layered bag or HEPA (high-efficiency particulate air) filter.  Pets with fur or feathers are triggers for some people. If you must have pets, take these precautions:    Keep pets out of your bedroom and off your  bed. Keep the bedroom door closed.    Cover the air vents in your bedroom with heavy material to filter the air.    Don't use carpets or cloth-covered furniture in your home. If this is not possible, keep pets out of rooms with these items.    Have someone bathe your pets every week. And brush them often.  If you smoke, do your best to quit.    Enroll in a stop-smoking program to increase your chance of success.    Ask your healthcare provider about medicines or other methods to help you quit.    Ask family members to quit smoking as well.    Don t allow anyone to smoke in your home, in your car, or around you.  Other steps to take    Make sure you know what to do if exercise is a trigger for you. Many people use quick-relief inhalers before exercise or physical activity.    Get a flu shot every year and get pneumonia shots as advised by your healthcare provider.    Try to keep your windows closed during pollen seasons and when mold counts are high.    On cold or windy days, cover your nose and mouth with a scarf.    Try to stay away from people who are sick with colds or the flu. Wash your hands often or use a hand . If respiratory infections like colds or flu trigger your asthma, use your quick-relief medicines as soon as you begin to notice respiratory symptoms. They may include a runny or stuffy nose, sore throat, or a cough.  Follow-up care  Make a follow-up appointment as directed by our staff. Follow your asthma action plan if you were given one.  When to seek medical attention  Call 911 right away if you have:    Severe wheezing    Shortness of breath that is not relieved by your quick-relief medication    Trouble walking or talking because of shortness of breath    Blue lips or fingernails    If you monitor symptoms with a peak flow meter, readings less than 50% of your personal best   Date Last Reviewed: 2/3/2017    1751-8316 [employer]. [address] [place] [address], [place] [address].  All rights reserved. This information is not intended as a substitute for professional medical care. Always follow your healthcare professional's instructions.        i  Knee Pain [Uncertain Cause]    There are several common causes for knee pain. These include a sprain of the ligaments that support the joint; an injury to the cartilage lining of the joint; arthritis from wear-and-tear or inflammation; and other causes. There may also be swelling, reduced movement of the knee joint and pain with walking. A definite diagnosis was not made today. If your symptoms do not improve, further follow-up and testing may be needed.  Home Care:  1. Stay off the injured leg as much as possible until pain improves.  2. Apply an ice pack (ice cubes in a plastic bag, wrapped in a towel) over the injured area for 20 minutes every 1-2 hours the first day. Continue with ice packs 3-4 times a day for the next two days, then as needed for the relief of pain and swelling.  3. You may use acetaminophen (Tylenol) or ibuprofen (Motrin, Advil) to control pain, unless another pain medicine was prescribed. [NOTE: If you have chronic liver or kidney disease or ever had a stomach ulcer or GI bleeding, talk with your doctor before using these medicines.]  4. If CRUTCHES or a walker have been recommended, do not bear full weight on the injured leg until you can do so without pain. Check with your doctor before returning to sports or full work duties.  5. If you have a VELCRO KNEE BRACE:    You may open the splint to apply ice.    You may remove the splint to bathe and sleep, unless told otherwise.  Follow Up  with your doctor within 1-2 weeks or as advised if not improving.  [NOTE: If X-rays were taken, they will be reviewed by a radiologist. You will be notified of any new findings that may affect your care.]  Get Prompt Medical Attention  if any of the following occur:    Toes or foot becomes swollen, cold, blue, numb or tingly    Pain or  swelling increases in the knee or calf    Warmth or redness appears over the knee or calf    Other joints become painful    Fever or rash appears    Shortness of breath or chest pain    Fever of 100.4 F (38 C) or higher, or as directed by your healthcare provider    8631-6322 Wendy [employer], [address] [place] [address]. All rights reserved. This information is not intended as a substitute for professional medical care. Always follow your healthcare professional's instructions.                  Follow-ups after your visit        Additional Services     ORTHOPEDICS ADULT REFERRAL       Your provider has referred you to: [facility]: [facility] [place] [phone]    [email or web address]    Please be aware that coverage of these services is subject to the terms and limitations of your health insurance plan.  Call member services at your health plan with any benefit or coverage questions.      Please bring the following to your appointment:    >>   Any x-rays, CTs or MRIs which have been performed.  Contact the facility where they were done to arrange for  prior to your scheduled appointment.  Any new CT, MRI or other procedures ordered by your specialist must be performed at a [facility] facility or coordinated by your clinic's referral office.    >>   List of current medications   >>   This referral request   >>   Any documents/labs given to you for this referral                  Follow-up notes from your care team     Return if symptoms worsen or fail to improve.      Who to contact     If you have questions or need follow up information about today's clinic visit or your schedule please contact [facility] directly at [phone].  Normal or non-critical lab and imaging results will be communicated to you by MyChart, letter or phone within 4 business days after the clinic has received the results. If you do not hear from us within  7 days, please contact the clinic through [employer]ern or phone. If you have a critical or abnormal lab result, we will notify you by phone as soon as possible.  Submit refill requests through Tern or call your pharmacy and they will forward the refill request to us. Please allow 3 business days for your refill to be completed.          Additional Information About Your Visit        [employer] Information     Tern gives you secure access to your electronic health record. If you see a primary care provider, you can also send messages to your care team and make appointments. If you have questions, please call your primary care clinic.  If you do not have a primary care provider, please call [phone] and they will assist you.        Care EveryWhere ID     This is your Care EveryWhere ID. This could be used by other organizations to access your [facility] medical records  [id number]        Your Vitals Were     Pulse Temperature Pulse Oximetry Breastfeeding? BMI (Body Mass Index)       101 98.3  F (36.8  C) (Oral) 98% No 27.37 kg/m2        Blood Pressure from Last 3 Encounters:   12/05/17 138/90   10/26/17 (!) 156/106   10/16/17 136/78    Weight from Last 3 Encounters:   12/05/17 156 lb 12.8 oz (71.1 kg)   10/26/17 153 lb 9.6 oz (69.7 kg)   10/16/17 159 lb (72.1 kg)              We Performed the Following     ORTHOPEDICS ADULT REFERRAL          Today's Medication Changes          These changes are accurate as of: 12/5/17  2:45 PM.  If you have any questions, ask your nurse or doctor.               Start taking these medicines.        Dose/Directions    guaiFENesin-codeine 100-10 MG/5ML Soln solution   Commonly known as:  ROBITUSSIN AC   Used for:  Cough   Started by:  Moose Mcgarry PA        Dose:  1-2 tsp.   Take 5-10 mLs by mouth every 4 hours as needed for cough   Quantity:  180 mL   Refills:  0       predniSONE 10 MG tablet   Commonly known as:  DELTASONE   Used for:  Moderate asthma with  exacerbation, unspecified whether persistent   Started by:  Moose Mcgarry PA        4 tabs PO QD x 2 days then 3 tabs PO QD x 2 days then 2 tabs PO QD x 2 days then 1 tab PO QD x 2 days   Quantity:  20 tablet   Refills:  0         These medicines have changed or have updated prescriptions.        Dose/Directions    * ipratropium - albuterol 0.5 mg/2.5 mg/3 mL 0.5-2.5 (3) MG/3ML neb solution   Commonly known as:  DUONEB   This may have changed:  Another medication with the same name was added. Make sure you understand how and when to take each.   Used for:  Acute bronchitis   Changed by:  Ana Maria Mustafa PA-C        Dose:  1 vial   Take 1 vial (3 mLs) by nebulization every 4 hours as needed for shortness of breath / dyspnea or wheezing   Quantity:  30 vial   Refills:  1       * ipratropium - albuterol 0.5 mg/2.5 mg/3 mL 0.5-2.5 (3) MG/3ML neb solution   Commonly known as:  DUONEB   This may have changed:  You were already taking a medication with the same name, and this prescription was added. Make sure you understand how and when to take each.   Used for:  Moderate asthma with exacerbation, unspecified whether persistent   Changed by:  Moose Mcgarry PA        Dose:  1 vial   Take 1 vial (3 mLs) by nebulization every 4 hours as needed for shortness of breath / dyspnea or wheezing   Quantity:  30 vial   Refills:  1       * Notice:  This list has 2 medication(s) that are the same as other medications prescribed for you. Read the directions carefully, and ask your doctor or other care provider to review them with you.         Where to get your medicines      These medications were sent to [id number] - [place], [place] - [address] [facility]  [address] [facility] [address]     Phone:  [phone]     ipratropium - albuterol 0.5 mg/2.5 mg/3 mL 0.5-2.5 (3) MG/3ML neb solution    predniSONE 10 MG tablet         Some of these will need a paper prescription and others  can be bought over the counter.  Ask your nurse if you have questions.     Bring a paper prescription for each of these medications     guaiFENesin-codeine 100-10 MG/5ML Soln solution                Primary Care Provider Office Phone # Fax #    [facility] [employer] [phone]       [address]  [facility] [phone]        Equal Access to Services     [facility] : Hadii aad ku hadasho Soomaali, waaxda luqadaha, qaybta kaalmada adeegyada, waxay idiin hayaan adeeg kharash la'aan . So [facility] [phone].    ATENCIÓN: Si yinala espnan, tiene a new disposición servicios gratuitos de asistencia lingüística. Llame al [phone].    We comply with applicable federal civil rights laws and [place] laws. We do not discriminate on the basis of race, color, national origin, age, disability, sex, sexual orientation, or gender identity.            Thank you!     Thank you for choosing [facility]  for your care. Our goal is always to provide you with excellent care. Hearing back from our patients is one way we can continue to improve our services. Please take a few minutes to complete the written survey that you may receive in the mail after your visit with us. Thank you!             Your Updated Medication List - Protect others around you: Learn how to safely use, store and throw away your medicines at www.disposemymeds.org.          This list is accurate as of: 12/5/17  2:45 PM.  Always use your most recent med list.                   Brand Name Dispense Instructions for use Diagnosis    acyclovir 5 % ointment    ZOVIRAX    30 g    Apply  topically 5 times daily. As needed for cold sores.    Recurrent cold sores       ADVIL PO           desonide 0.05 % cream    DESOWEN    60 g    Apply sparingly to Legs, up to twice daily as needed.    Intrinsic atopic dermatitis       fluticasone 50 MCG/ACT spray    FLONASE          guaiFENesin-codeine 100-10 MG/5ML Soln solution     ROBITUSSIN AC    180 mL    Take 5-10 mLs by mouth every 4 hours as needed for cough    Cough       * ipratropium - albuterol 0.5 mg/2.5 mg/3 mL 0.5-2.5 (3) MG/3ML neb solution    DUONEB    30 vial    Take 1 vial (3 mLs) by nebulization every 4 hours as needed for shortness of breath / dyspnea or wheezing    Acute bronchitis       * ipratropium - albuterol 0.5 mg/2.5 mg/3 mL 0.5-2.5 (3) MG/3ML neb solution    DUONEB    30 vial    Take 1 vial (3 mLs) by nebulization every 4 hours as needed for shortness of breath / dyspnea or wheezing    Moderate asthma with exacerbation, unspecified whether persistent       levocetirizine 5 MG tablet    XYZAL    30 tablet    Take 1 tablet by mouth every evening.    Mild persistent asthma       levonorgestrel-ethinyl estradiol per tablet    TRIVORA (28)    112 tablet    One tablet continuously x 3 months, then off one week, repeat cycle x one year    Encounter for surveillance of contraceptive pills       losartan 100 MG tablet    COZAAR    90 tablet    Take 1 tablet (100 mg) by mouth daily    Hypertension goal BP (blood pressure) < 140/90       mometasone 220 MCG/INH Inhaler    ASMANEX 120 METERED DOSES    1 Inhaler    Inhale 2 puffs into the lungs daily.    Mild persistent asthma       montelukast 10 MG tablet    SINGULAIR          omeprazole 40 MG capsule    priLOSEC    90 capsule    Take 1 capsule (40 mg) by mouth daily    Gastroesophageal reflux disease without esophagitis       PATADAY 0.2 % Soln   Generic drug:  olopatadine HCl      Apply 1 drop to eye as needed. Each eye    HTN (hypertension)       predniSONE 10 MG tablet    DELTASONE    20 tablet    4 tabs PO QD x 2 days then 3 tabs PO QD x 2 days then 2 tabs PO QD x 2 days then 1 tab PO QD x 2 days    Moderate asthma with exacerbation, unspecified whether persistent       SEREVENT DISKUS 50 MCG/DOSE diskus inhaler   Generic drug:  salmeterol           valACYclovir 500 MG tablet    VALTREX    135 tablet    TAKE 1 TABLET BY  MOUTH EVERY DAY AND INCREASE TO 2 TABLETS DAILY FOR 10 DAYS WITH OUTBREAKS    Recurrent cold sores       * VENTOLIN  (90 BASE) MCG/ACT Inhaler   Generic drug:  albuterol      as needed        * albuterol (2.5 MG/3ML) 0.083% neb solution     30 vial    Take 3 mLs by nebulization every 4 hours as needed for shortness of breath / dyspnea.    Mild persistent asthma       * Notice:  This list has 4 medication(s) that are the same as other medications prescribed for you. Read the directions carefully, and ask your doctor or other care provider to review them with you.

## 2017-12-05 NOTE — PATIENT INSTRUCTIONS
At [facility], we strive to deliver an exceptional experience to you, every time we see you.  If you receive a survey in the mail, please send us back your thoughts. We really do value your feedback.    Based on your medical history, these are the current health maintenance/preventive care services that you are due for (some may have been done at this visit.)  Health Maintenance Due   Topic Date Due     EYE EXAM Q1 YEAR  11/14/2015     COLON CANCER SCREEN (SYSTEM ASSIGNED)  02/01/2017     ASTHMA ACTION PLAN Q1 YR  03/07/2017         Suggested websites for health information:  Www.Reduxio.Citydeal.de : Up to date and easily searchable information on multiple topics.  Www.[employer].gov : medication info, interactive tutorials, watch real surgeries online  Www.familydoctor.org : good info from the Academy of Family Physicians  Www.cdc.gov : public health info, travel advisories, epidemics (H1N1)  Www.aap.org : children's health info, normal development, vaccinations  Www.health.[facility].[place].us : [place] dept of health, public health issues in [place], N1N1    Your care team:                            Family Medicine Internal Medicine   MD Shimon Blevins MD Shantel Branch-Fleming, MD Katya Georgiev PA-C Nam Ho, MD Pediatrics   JEANNE Tay, CECILLE GALEANO CNP   MD Aminata Locke MD Deborah Mielke, MD Kim Thein, APRN [facility]      Clinic hours: Monday - Thursday 7 am-7 pm; Fridays 7 am-5 pm.   Urgent care: Monday - Friday 11 am-9 pm; Saturday and Sunday 9 am-5 pm.  Pharmacy : Monday -Thursday 8 am-8 pm; Friday 8 am-6 pm; Saturday and Sunday 9 am-5 pm.     Clinic: [phone]   Pharmacy: [phone]    Discharge Instructions for Asthma  You have been diagnosed with an asthma attack. With the help of your healthcare provider, you can keep your asthma under control and have less emergency department visits and stays in the  hospital.    Managing asthma    Take your asthma medicines exactly as your provider tells you. Do this even if you feel that your athma is under control.    Learn how to monitor your asthma. Some people watch for early changes of symptoms getting worse. Some use a peak flow meter. Your healthcare provider may decide to give you an asthma action plan.    Be sure to always have a quick-relief inhaler with you. If you were given a prescription, make sure you go to a pharmacy to get it filled as soon as possible.  Controlling asthma triggers  Triggers are those things that make your asthma symptoms worse or cause asthma attacks. Many people with asthma have allergies that can be triggers. Your healthcare provider may have you get allergy testing to find out what you are allergic to. This can help you stay away from triggers.  Dust or dust mites are a common asthma trigger. To avoid a dust mites, do the following:    Use dust-proof covers on your mattress and pillows. Wash the sheets and blankets on your bed once a week in very hot water.    Don t sleep or lie on cloth-covered cushions or furniture.    Ask someone else to vacuum and dust your house.    If you do vacuum and dust yourself, wear a dust mask. You can buy them from the [employer] store.    Use a vacuum with a double-layered bag or HEPA (high-efficiency particulate air) filter.  Pets with fur or feathers are triggers for some people. If you must have pets, take these precautions:    Keep pets out of your bedroom and off your bed. Keep the bedroom door closed.    Cover the air vents in your bedroom with heavy material to filter the air.    Don't use carpets or cloth-covered furniture in your home. If this is not possible, keep pets out of rooms with these items.    Have someone bathe your pets every week. And brush them often.  If you smoke, do your best to quit.    Enroll in a stop-smoking program to increase your chance of success.    Ask your healthcare  provider about medicines or other methods to help you quit.    Ask family members to quit smoking as well.    Don t allow anyone to smoke in your home, in your car, or around you.  Other steps to take    Make sure you know what to do if exercise is a trigger for you. Many people use quick-relief inhalers before exercise or physical activity.    Get a flu shot every year and get pneumonia shots as advised by your healthcare provider.    Try to keep your windows closed during pollen seasons and when mold counts are high.    On cold or windy days, cover your nose and mouth with a scarf.    Try to stay away from people who are sick with colds or the flu. Wash your hands often or use a hand . If respiratory infections like colds or flu trigger your asthma, use your quick-relief medicines as soon as you begin to notice respiratory symptoms. They may include a runny or stuffy nose, sore throat, or a cough.  Follow-up care  Make a follow-up appointment as directed by our staff. Follow your asthma action plan if you were given one.  When to seek medical attention  Call 911 right away if you have:    Severe wheezing    Shortness of breath that is not relieved by your quick-relief medication    Trouble walking or talking because of shortness of breath    Blue lips or fingernails    If you monitor symptoms with a peak flow meter, readings less than 50% of your personal best   Date Last Reviewed: 2/3/2017    4536-2630 [employer]. [address] [place] [address]. All rights reserved. This information is not intended as a substitute for professional medical care. Always follow your healthcare professional's instructions.        i  Knee Pain [Uncertain Cause]    There are several common causes for knee pain. These include a sprain of the ligaments that support the joint; an injury to the cartilage lining of the joint; arthritis from wear-and-tear or inflammation; and other causes. There may also be  swelling, reduced movement of the knee joint and pain with walking. A definite diagnosis was not made today. If your symptoms do not improve, further follow-up and testing may be needed.  Home Care:  1. Stay off the injured leg as much as possible until pain improves.  2. Apply an ice pack (ice cubes in a plastic bag, wrapped in a towel) over the injured area for 20 minutes every 1-2 hours the first day. Continue with ice packs 3-4 times a day for the next two days, then as needed for the relief of pain and swelling.  3. You may use acetaminophen (Tylenol) or ibuprofen (Motrin, Advil) to control pain, unless another pain medicine was prescribed. [NOTE: If you have chronic liver or kidney disease or ever had a stomach ulcer or GI bleeding, talk with your doctor before using these medicines.]  4. If CRUTCHES or a walker have been recommended, do not bear full weight on the injured leg until you can do so without pain. Check with your doctor before returning to sports or full work duties.  5. If you have a VELCRO KNEE BRACE:    You may open the splint to apply ice.    You may remove the splint to bathe and sleep, unless told otherwise.  Follow Up  with your doctor within 1-2 weeks or as advised if not improving.  [NOTE: If X-rays were taken, they will be reviewed by a radiologist. You will be notified of any new findings that may affect your care.]  Get Prompt Medical Attention  if any of the following occur:    Toes or foot becomes swollen, cold, blue, numb or tingly    Pain or swelling increases in the knee or calf    Warmth or redness appears over the knee or calf    Other joints become painful    Fever or rash appears    Shortness of breath or chest pain    Fever of 100.4 F (38 C) or higher, or as directed by your healthcare provider    1766-5325 Wendy Meeks, [address] [place] [address], [place] [address]. All rights reserved. This information is not intended as a substitute for professional medical care. Always  follow your healthcare professional's instructions.

## 2017-12-06 ENCOUNTER — RADIANT APPOINTMENT (OUTPATIENT)
Dept: GENERAL RADIOLOGY | Facility: CLINIC | Age: 52
End: 2017-12-06
Attending: ORTHOPAEDIC SURGERY
Payer: COMMERCIAL

## 2017-12-06 ENCOUNTER — OFFICE VISIT (OUTPATIENT)
Dept: ORTHOPEDICS | Facility: CLINIC | Age: 52
End: 2017-12-06
Payer: COMMERCIAL

## 2017-12-06 VITALS — HEIGHT: 64 IN | WEIGHT: 154.4 LBS | BODY MASS INDEX: 26.36 KG/M2 | RESPIRATION RATE: 16 BRPM

## 2017-12-06 DIAGNOSIS — M25.561 CHRONIC PAIN OF RIGHT KNEE: ICD-10-CM

## 2017-12-06 DIAGNOSIS — M25.561 CHRONIC PAIN OF RIGHT KNEE: Primary | ICD-10-CM

## 2017-12-06 DIAGNOSIS — G89.29 CHRONIC PAIN OF RIGHT KNEE: ICD-10-CM

## 2017-12-06 DIAGNOSIS — G89.29 CHRONIC PAIN OF RIGHT KNEE: Primary | ICD-10-CM

## 2017-12-06 PROCEDURE — 99244 OFF/OP CNSLTJ NEW/EST MOD 40: CPT | Performed by: ORTHOPAEDIC SURGERY

## 2017-12-06 PROCEDURE — 73562 X-RAY EXAM OF KNEE 3: CPT | Mod: RT

## 2017-12-06 ASSESSMENT — PAIN SCALES - GENERAL: PAINLEVEL: SEVERE PAIN (6)

## 2017-12-06 NOTE — PATIENT INSTRUCTIONS
Please remember to call and schedule a follow up appointment if one was recommended at your earliest convenience.  Orthopedics CLINIC HOURS TELEPHONE NUMBER   Dr. Kerrie Vicente  Certified Medical Assistant   Monday & Wednesday   8am - 5pm  Thursday 1pm - 5pm  Friday 8am -11:30am Specialty schedulers:   [phone] to schedule your surgery.  Main Clinic:   [phone] to make an appointment with any provider.    Urgent Care locations:    [facility] Monday-Friday Closed  Saturday-Sunday 9am-5pm      Monday-Friday 12pm - 8pm  Saturday-Sunday 9am-5pm [phone]     If SURGERY has been recommended, please call our Specialty Schedulers at [phone] to schedule your procedure.    If you need a medication refill, please contact your pharmacy. Please allow 3 business days for your refill to be completed.    If an MRI or CT scan has been recommended, please call [facility] Schedulers at [phone] to schedule your appointment.  Use [employer] (secure e-mail communication and access to your chart) to send a message or to make an appointment. Please ask how you can sign up for [employer].  Your care team's suggested websites for health information:   Www.[facility].org : Up to date and easily searchable information on multiple topics.   Www.health.[facility].[place].us : [place] dept of heat, public health issues in [place], N1N1

## 2017-12-06 NOTE — MR AVS SNAPSHOT
After Visit Summary   12/6/2017    Kylie Arellano    MRN: [id number]           Patient Information     Date Of Birth          1965        Visit Information        Provider Department      12/6/2017 2:15 PM Johan Sy MD [facility]        Today's Diagnoses     Chronic pain of right knee    -  1      Care Instructions    Please remember to call and schedule a follow up appointment if one was recommended at your earliest convenience.  Orthopedics CLINIC HOURS TELEPHONE NUMBER   Dr. Kerrie Vicente  Certified Medical Assistant   Monday & Wednesday   8am - 5pm  Thursday 1pm - 5pm  Friday 8am -11:30am Specialty schedulers:   [phone] to schedule your surgery.  Main Clinic:   [phone] to make an appointment with any provider.    Urgent Care locations:    [facility] Monday-Friday Closed  Saturday-Sunday 9am-5pm      Monday-Friday 12pm - 8pm  Saturday-Sunday 9am-5pm [phone]     If SURGERY has been recommended, please call our Specialty Schedulers at [phone] to schedule your procedure.    If you need a medication refill, please contact your pharmacy. Please allow 3 business days for your refill to be completed.    If an MRI or CT scan has been recommended, please call [facility] Schedulers at [phone] to schedule your appointment.  Use [employer] (secure e-mail communication and access to your chart) to send a message or to make an appointment. Please ask how you can sign up for readeo.  Your care team's suggested websites for health information:   Www.RealOps.org : Up to date and easily searchable information on multiple topics.   Www.health.[facility].[place].us : [place] dept of heat, public health issues in [place], N1N1              Follow-ups after your visit        Follow-up notes from your care team     Return if symptoms worsen or fail to improve.      Future tests that were ordered for you today   "   Open Future Orders        Priority Expected Expires Ordered    MR Knee Right w/o Contrast Routine  12/6/2018 12/6/2017            Who to contact     If you have questions or need follow up information about today's clinic visit or your schedule please contact [facility] [place] directly at [phone].  Normal or non-critical lab and imaging results will be communicated to you by MyChart, letter or phone within 4 business days after the clinic has received the results. If you do not hear from us within 7 days, please contact the clinic through [employer] or phone. If you have a critical or abnormal lab result, we will notify you by phone as soon as possible.  Submit refill requests through Mint or call your pharmacy and they will forward the refill request to us. Please allow 3 business days for your refill to be completed.          Additional Information About Your Visit        MyChart Information     Mint gives you secure access to your electronic health record. If you see a primary care provider, you can also send messages to your care team and make appointments. If you have questions, please call your primary care clinic.  If you do not have a primary care provider, please call [phone] and they will assist you.        Care EveryWhere ID     This is your Care EveryWhere ID. This could be used by other organizations to access your [facility] medical records  [id number]        Your Vitals Were     Respirations Height BMI (Body Mass Index)             16 5' 3.5\" (1.613 m) 26.92 kg/m2          Blood Pressure from Last 3 Encounters:   12/05/17 138/90   10/26/17 (!) 156/106   10/16/17 136/78    Weight from Last 3 Encounters:   12/06/17 154 lb 6.4 oz (70 kg)   12/05/17 156 lb 12.8 oz (71.1 kg)   10/26/17 153 lb 9.6 oz (69.7 kg)               Primary Care Provider Office Phone # Fax #    [facility] [phone]       [address]  [place] " [address]        Equal Access to Services     [facility]: Hadii aad ku srinath Jones, walaceyda luqadaha, qaybta kaalmada reynamelodyngozi, waxedinson azam cottocarocelestina cordero. So [facility] [phone].    ATENCIÓN: Si habla español, tiene a new disposición servicios gratuitos de asistencia lingüística. Faye al [phone].    We comply with applicable federal civil rights laws and [place] laws. We do not discriminate on the basis of race, color, national origin, age, disability, sex, sexual orientation, or gender identity.            Thank you!     Thank you for choosing [facility]  for your care. Our goal is always to provide you with excellent care. Hearing back from our patients is one way we can continue to improve our services. Please take a few minutes to complete the written survey that you may receive in the mail after your visit with us. Thank you!             Your Updated Medication List - Protect others around you: Learn how to safely use, store and throw away your medicines at www.disposemymeds.org.          This list is accurate as of: 12/6/17  3:38 PM.  Always use your most recent med list.                   Brand Name Dispense Instructions for use Diagnosis    acyclovir 5 % ointment    ZOVIRAX    30 g    Apply  topically 5 times daily. As needed for cold sores.    Recurrent cold sores       ADVIL PO           desonide 0.05 % cream    DESOWEN    60 g    Apply sparingly to Legs, up to twice daily as needed.    Intrinsic atopic dermatitis       fluticasone 50 MCG/ACT spray    FLONASE          guaiFENesin-codeine 100-10 MG/5ML Soln solution    ROBITUSSIN AC    180 mL    Take 5-10 mLs by mouth every 4 hours as needed for cough    Cough       * ipratropium - albuterol 0.5 mg/2.5 mg/3 mL 0.5-2.5 (3) MG/3ML neb solution    DUONEB    30 vial    Take 1 vial (3 mLs) by nebulization every 4 hours as needed for shortness of breath / dyspnea or wheezing    Acute bronchitis       * ipratropium  - albuterol 0.5 mg/2.5 mg/3 mL 0.5-2.5 (3) MG/3ML neb solution    DUONEB    30 vial    Take 1 vial (3 mLs) by nebulization every 4 hours as needed for shortness of breath / dyspnea or wheezing    Moderate asthma with exacerbation, unspecified whether persistent       levocetirizine 5 MG tablet    XYZAL    30 tablet    Take 1 tablet by mouth every evening.    Mild persistent asthma       levonorgestrel-ethinyl estradiol per tablet    TRIVORA (28)    112 tablet    One tablet continuously x 3 months, then off one week, repeat cycle x one year    Encounter for surveillance of contraceptive pills       losartan 100 MG tablet    COZAAR    90 tablet    Take 1 tablet (100 mg) by mouth daily    Hypertension goal BP (blood pressure) < 140/90       mometasone 220 MCG/INH Inhaler    ASMANEX 120 METERED DOSES    1 Inhaler    Inhale 2 puffs into the lungs daily.    Mild persistent asthma       montelukast 10 MG tablet    SINGULAIR          omeprazole 40 MG capsule    priLOSEC    90 capsule    Take 1 capsule (40 mg) by mouth daily    Gastroesophageal reflux disease without esophagitis       PATADAY 0.2 % Soln   Generic drug:  olopatadine HCl      Apply 1 drop to eye as needed. Each eye    HTN (hypertension)       predniSONE 10 MG tablet    DELTASONE    20 tablet    4 tabs PO QD x 2 days then 3 tabs PO QD x 2 days then 2 tabs PO QD x 2 days then 1 tab PO QD x 2 days    Moderate asthma with exacerbation, unspecified whether persistent       SEREVENT DISKUS 50 MCG/DOSE diskus inhaler   Generic drug:  salmeterol           valACYclovir 500 MG tablet    VALTREX    135 tablet    TAKE 1 TABLET BY MOUTH EVERY DAY AND INCREASE TO 2 TABLETS DAILY FOR 10 DAYS WITH OUTBREAKS    Recurrent cold sores       * VENTOLIN  (90 BASE) MCG/ACT Inhaler   Generic drug:  albuterol      as needed        * albuterol (2.5 MG/3ML) 0.083% neb solution     30 vial    Take 3 mLs by nebulization every 4 hours as needed for shortness of breath / dyspnea.     Mild persistent asthma       * Notice:  This list has 4 medication(s) that are the same as other medications prescribed for you. Read the directions carefully, and ask your doctor or other care provider to review them with you.

## 2017-12-06 NOTE — PROGRESS NOTES
CHIEF COMPLAINT:   Chief Complaint   Patient presents with     Knee Pain     Right knee pain. Onset: over the summer 2017. NKI. Patient is not sure why her knee pain began but it may be from golfing. Pain is posterior. Pain with kneeling down or putting pressure on it/leaning on it. No treatments.        Kylie Arellano is seen today in the [facility] for evaluation of right knee pain at the request of DILAN Saldaña     HISTORY OF PRESENT ILLNESS    Kylie Arellano is a 52 year old female seen for evaluation of ongoing right knee pain with no known injury. Pain has been present since summer 2017. Pain just came one without injury. She notes this may have started after golfing. Pain is located over the posterior knee. Pain is aggravated with flexion and putting pressure onto the knee/ leaning onto it. Her pain is aggravated with getting up from a prolonged sitting position. Not much pain with walking. Denies locking and catching. Denies swelling. No pain at rest, no pain at night. Denies low back pain, denies hip pain. She has not had any treatments.    Of note: History of medial and lateral knee pain in the past as a runner. Would KT tape her knees.     Present symptoms: moderate to severe pain posterior knee, denies locking and catching or giving way    Pain severity: 6/10  Frequency of symptoms: frequently  Exacerbating Factors:  [id number], certain positions: flexion  Relieving Factors: rest, walking  Night Pain: No  Pain while at rest: No   Numbness or tingling: No   Patient has tried:     NSAIDS: No      Physical Therapy: No      Activity modification: No      Bracing: No      Injections: No     Ice: No      Assistive device:  No     Other: None    Orthopaedic PMH: history of knee pain in the past.    Other PMH:  has a past medical history of Cervical dysplasia, moderate (10/10); Cervical high risk HPV (human papillomavirus) test positive (04/19/2017); Dermatitis; GERD  (gastroesophageal reflux disease); HSV-1 (herpes simplex virus 1) infection; HTN (2005); Hypertension goal BP (blood pressure) < 140/90 (2/10/2011); Mild depression (H); Mild persistent asthma (2007); S/P LEEP of cervix (10/10); and Seasonal allergic rhinitis.  Patient Active Problem List    Diagnosis Date Noted     Atopic rhinitis 12/07/2011     Priority: Medium     (Problem list name updated by automated process. Provider to review and confirm.)       Hypertension goal BP (blood pressure) < 140/90 02/10/2011     Priority: Medium     CARDIOVASCULAR SCREENING; LDL GOAL LESS THAN 160 10/31/2010     Priority: Medium     Acne 08/23/2010     Priority: Medium     S/P LEEP (status post loop electrosurgical excision procedure)- АЛЕКСАНДР 2/3 08/23/2010     Priority: Medium     8/23/10:pap HSIL, plan [place]  10/2010:[place] АЛЕКСАНДР 2-3, plan LEEP  10/2010:LEEP  2/10/2011: pap ASC-US HPV negative plan to repeat pap in 3-4 months.   12/7/11 pap NIL/negative HPV. Plan--Yearly pap's if this is normal. (due 12/2012)   12/31/12 pap NIL. Plan-- yearly pap  1/6/14 pap NIL  1/7/15 pap NIL/neg HPV. Plan-- pap and HPV cotest 1 year  3/7/16 pap NIL/ + HR HPV other. Plan: Colposcopy.   4/18/16 [place]: ASCUS. Plan: cotest in 6 mo.   4/19/17: NIL Pap, + HR HPV (Neg 16/18). Plan cotest in 1 year.        Moderate persistent asthma without complication      Priority: Medium     See note 9/25/2012 . Patient see non-RAJNI NARAYAN for asthma care.       Seasonal allergic rhinitis      Priority: Medium     GERD (gastroesophageal reflux disease)      Priority: Medium       Surgical Hx:  has a past surgical history that includes sinus surgery; bunionectomy rt/lt (1985); surgical history of - (2003); tonsillectomy (1982); and Conization leep (2010).    Medications:   Current Outpatient Prescriptions:      ipratropium - albuterol 0.5 mg/2.5 mg/3 mL (DUONEB) 0.5-2.5 (3) MG/3ML neb solution, Take 1 vial (3 mLs) by nebulization every 4 hours as needed for shortness of  breath / dyspnea or wheezing, Disp: 30 vial, Rfl: 1     predniSONE (DELTASONE) 10 MG tablet, 4 tabs PO QD x 2 days then 3 tabs PO QD x 2 days then 2 tabs PO QD x 2 days then 1 tab PO QD x 2 days, Disp: 20 tablet, Rfl: 0     guaiFENesin-codeine (ROBITUSSIN AC) 100-10 MG/5ML SOLN solution, Take 5-10 mLs by mouth every 4 hours as needed for cough, Disp: 180 mL, Rfl: 0     omeprazole (PRILOSEC) 40 MG capsule, Take 1 capsule (40 mg) by mouth daily, Disp: 90 capsule, Rfl: 3     valACYclovir (VALTREX) 500 MG tablet, TAKE 1 TABLET BY MOUTH EVERY DAY AND INCREASE TO 2 TABLETS DAILY FOR 10 DAYS WITH OUTBREAKS, Disp: 135 tablet, Rfl: 3     losartan (COZAAR) 100 MG tablet, Take 1 tablet (100 mg) by mouth daily, Disp: 90 tablet, Rfl: 3     desonide (DESOWEN) 0.05 % cream, Apply sparingly to Legs, up to twice daily as needed., Disp: 60 g, Rfl: 2     levonorgestrel-ethinyl estradiol (TRIVORA, 28,) per tablet, One tablet continuously x 3 months, then off one week, repeat cycle x one year, Disp: 112 tablet, Rfl: 4     acyclovir (ZOVIRAX) 5 % ointment, Apply  topically 5 times daily. As needed for cold sores., Disp: 30 g, Rfl: 2     SEREVENT DISKUS 50 MCG/DOSE diskus inhaler, , Disp: , Rfl: 2     montelukast (SINGULAIR) 10 MG tablet, , Disp: , Rfl: 2     fluticasone (FLONASE) 50 MCG/ACT nasal spray, , Disp: , Rfl: 1     Ibuprofen (ADVIL PO), , Disp: , Rfl:      ipratropium - albuterol 0.5 mg/2.5 mg/3 mL (DUONEB) 0.5-2.5 (3) MG/3ML nebulization, Take 1 vial (3 mLs) by nebulization every 4 hours as needed for shortness of breath / dyspnea or wheezing, Disp: 30 vial, Rfl: 1     albuterol (2.5 MG/3ML) 0.083% nebulizer solution, Take 3 mLs by nebulization every 4 hours as needed for shortness of breath / dyspnea., Disp: 30 vial, Rfl: 1     mometasone (ASMANEX 120 METERED DOSES) 220 MCG/INH inhaler, Inhale 2 puffs into the lungs daily., Disp: 1 Inhaler, Rfl: 0     levocetirizine (XYZAL) 5 MG tablet, Take 1 tablet by mouth every evening.,  "Disp: 30 tablet, Rfl: 12     Olopatadine HCl (PATADAY) 0.2 % SOLN, Apply 1 drop to eye as needed. Each eye, Disp: , Rfl:      VENTOLIN  (90 BASE) MCG/ACT IN AERS, as needed, Disp: , Rfl:     Allergies: No Known Allergies    Social Hx: .   reports that she has never smoked. She has never used smokeless tobacco. She reports that she drinks alcohol. She reports that she does not use illicit drugs.    Family Hx: family history includes CANCER in her maternal grandfather; Connective Tissue Disorder in her mother; DIABETES in her paternal grandfather. There is no history of Hypertension, CEREBROVASCULAR DISEASE, Thyroid Disease, Glaucoma, or Macular Degeneration.    REVIEW OF SYSTEMS: 10 point ROS neg other than the symptoms noted above in the HPI and PMH. Notables include  CONSTITUTIONAL:NEGATIVE for fever, chills, change in weight  INTEGUMENTARY/SKIN: NEGATIVE for worrisome rashes, moles or lesions  MUSCULOSKELETAL:See HPI above  NEURO: NEGATIVE for weakness, dizziness or paresthesias    This document serves as a record of the services and decisions personally performed and made by Johan Sy MD. It was created on his behalf by Zulma Lacy, a trained medical scribe. The creation of this document is based the provider's statements to the medical scribe.    Scribe Zulma Lacy 2:11 PM 12/6/2017    PHYSICAL EXAM:  Resp 16  Ht 1.613 m (5' 3.5\")  Wt 70 kg (154 lb 6.4 oz)  BMI 26.92 kg/m2   GENERAL APPEARANCE: healthy, alert, no distress  SKIN: no suspicious lesions or rashes  NEURO: Normal strength and tone, mentation intact and speech normal  PSYCH:  mentation appears normal and affect normal, not anxious  RESPIRATORY: No increased work of breathing.    BILATERAL LOWER EXTREMITIES:  Gait: normal  Alignment: neutral, slight varus  No gross deformities or masses.  No Quad atrophy, strength normal.  Intact sensation deep peroneal nerve, superficial peroneal nerve, med/lat tibial nerve, sural " nerve, saphenous nerve  Intact EHL, EDL, TA, FHL, GS, quadriceps hamstrings and hip flexors  Toes warm and well perfused, brisk capillary refill. Palpable 2+ dp pulses.  Bilateral calf soft and nttp or squeeze.  No palpable popliteal lymphadenopathy.  DTRs: achilles 2+, patella 2+.  Edema: none  Hips with full, pain-free motion. No irritability with flexion, adduction, and internal rotation.    LEFT KNEE EXAM:    Skin: intact, no ecchymosis or erythema  Squat: 100 %, not limited by pain.    ROM: 0 extension to 135+ flexion  Tight hamstrings on straight leg raise.  Effusion: none  Tender: medial joint line, pes anserine bursa   NTTP lat joint line, anterior or posterior knee  McMurrays: negative    MCL: stable, and non-painful at both 0 and 30 degrees knee flexion  Varus stress: stable, and non-painful at both 0 and 30 degrees knee flexion  Lachmans: neg, firm endpoint  Posterior Drawer stable  Patellofemoral joint:                Apprehension: negative              Crepitations: mild   Grind: positive    RIGHT KNEE EXAM:    Skin: intact, no ecchymosis or erythema  Squat: 100 %, with anterior discomfort      ROM: 0 extension to 130 flexion  Tight hamstrings on straight leg raise.  Effusion: none  Tender: medial joint line, pes anserine bursa, posterior knee over popliteal fossa, medial and lateral gastroc insertions  NTTP med/lat joint line, anterior knee  McMurrays: negative    MCL: stable, and non-painful at both 0 and 30 degrees knee flexion  Varus stress: stable, and non-painful at both 0 and 30 degrees knee flexion  Lachmans: neg, firm endpoint  Posterior Drawer stable  Patellofemoral joint:                Apprehension: negative              Crepitations: mild   Grind: positive    X-RAY:  3 views right knee from 12/6/2017 were reviewed in clinic today. On my review, no obvious fractures or dislocations. Minimal patello-femoral osteophytes.        Impression:   52 year old female with chronic  right knee  pain.      Plan:    * Reviewed imaging with patient. Also, clinical exam findings. Unclear as to source of pain  * Discussed with patient that based on physical exam findings, it is not possible to completely rule out medial meniscus tear, popliteal cyst, muscle tendonitis .  * An MRI of the right knee was ordered for further evaluation.     * Rest  * Activity modification - avoid activities that aggravate symptoms.  * NSAIDS - regular use for inflammation, with food, as long as no contra-indications. Please discuss with pcp if needed.  * Ice twice daily to three times daily.  * Compression wrap  * Elevation of extremity to reduce swelling  * Tylenol as needed for pain    * After having the MRI, I would like the patient to call me so that we can discuss the results as well as how to proceed.       The information in this document, created by a scribe for me, accurately reflects the services I personally performed and the decisions made by me. I have reviewed and approved this document for accuracy.     Johan Sy M.D., M.S.  Dept. of Orthopaedic Surgery  [facility]

## 2017-12-06 NOTE — LETTER
12/6/2017         RE: Kylie Arellano  [address]  [place] [address]        Dear Colleague,    Thank you for referring your patient, Kylie Arellano, to the [facility]. Please see a copy of my visit note below.    CHIEF COMPLAINT:   Chief Complaint   Patient presents with     Knee Pain     Right knee pain. Onset: over the summer 2017. NKI. Patient is not sure why her knee pain began but it may be from golfing. Pain is posterior. Pain with kneeling down or putting pressure on it/leaning on it. No treatments.        Kylie Arellano is seen today in the [facility] for evaluation of right knee pain at the request of DILAN Saldaña     HISTORY OF PRESENT ILLNESS    Kylie Arellano is a 52 year old female seen for evaluation of ongoing right knee pain with no known injury. Pain has been present since summer 2017. Pain just came one without injury. She notes this may have started after golfing. Pain is located over the posterior knee. Pain is aggravated with flexion and putting pressure onto the knee/ leaning onto it. Her pain is aggravated with getting up from a prolonged sitting position. Not much pain with walking. Denies locking and catching. Denies swelling. No pain at rest, no pain at night. Denies low back pain, denies hip pain. She has not had any treatments.    Of note: History of medial and lateral knee pain in the past as a runner. Would KT tape her knees.     Present symptoms: moderate to severe pain posterior knee, denies locking and catching or giving way    Pain severity: 6/10  Frequency of symptoms: frequently  Exacerbating Factors:  [id number], certain positions: flexion  Relieving Factors: rest, walking  Night Pain: No  Pain while at rest: No   Numbness or tingling: No   Patient has tried:     NSAIDS: No      Physical Therapy: No      Activity modification: No      Bracing: No      Injections: No     Ice: No      Assistive  device:  No     Other: None    Orthopaedic PMH: history of knee pain in the past.    Other PMH:  has a past medical history of Cervical dysplasia, moderate (10/10); Cervical high risk HPV (human papillomavirus) test positive (04/19/2017); Dermatitis; GERD (gastroesophageal reflux disease); HSV-1 (herpes simplex virus 1) infection; HTN (2005); Hypertension goal BP (blood pressure) < 140/90 (2/10/2011); Mild depression (H); Mild persistent asthma (2007); S/P LEEP of cervix (10/10); and Seasonal allergic rhinitis.  Patient Active Problem List    Diagnosis Date Noted     Atopic rhinitis 12/07/2011     Priority: Medium     (Problem list name updated by automated process. Provider to review and confirm.)       Hypertension goal BP (blood pressure) < 140/90 02/10/2011     Priority: Medium     CARDIOVASCULAR SCREENING; LDL GOAL LESS THAN 160 10/31/2010     Priority: Medium     Acne 08/23/2010     Priority: Medium     S/P LEEP (status post loop electrosurgical excision procedure)- АЛЕКСАНДР 2/3 08/23/2010     Priority: Medium     8/23/10:pap HSIL, plan [place]  10/2010:[facility] АЛЕКСАНДР 2-3, plan LEEP  10/2010:LEEP  2/10/2011: pap ASC-US HPV negative plan to repeat pap in 3-4 months.   12/7/11 pap NIL/negative HPV. Plan--Yearly pap's if this is normal. (due 12/2012)   12/31/12 pap NIL. Plan-- yearly pap  1/6/14 pap NIL  1/7/15 pap NIL/neg HPV. Plan-- pap and HPV cotest 1 year  3/7/16 pap NIL/ + HR HPV other. Plan: Colposcopy.   4/18/16 [place]: ASCUS. Plan: cotest in 6 mo.   4/19/17: NIL Pap, + HR HPV (Neg 16/18). Plan cotest in 1 year.        Moderate persistent asthma without complication      Priority: Medium     See note 9/25/2012 . Patient see non-RAJNI NARAYAN for asthma care.       Seasonal allergic rhinitis      Priority: Medium     GERD (gastroesophageal reflux disease)      Priority: Medium       Surgical Hx:  has a past surgical history that includes sinus surgery; bunionectomy rt/lt (1985); surgical history of - (2003); tonsillectomy  (1982); and Judy cervantes (2010).    Medications:   Current Outpatient Prescriptions:      ipratropium - albuterol 0.5 mg/2.5 mg/3 mL (DUONEB) 0.5-2.5 (3) MG/3ML neb solution, Take 1 vial (3 mLs) by nebulization every 4 hours as needed for shortness of breath / dyspnea or wheezing, Disp: 30 vial, Rfl: 1     predniSONE (DELTASONE) 10 MG tablet, 4 tabs PO QD x 2 days then 3 tabs PO QD x 2 days then 2 tabs PO QD x 2 days then 1 tab PO QD x 2 days, Disp: 20 tablet, Rfl: 0     guaiFENesin-codeine (ROBITUSSIN AC) 100-10 MG/5ML SOLN solution, Take 5-10 mLs by mouth every 4 hours as needed for cough, Disp: 180 mL, Rfl: 0     omeprazole (PRILOSEC) 40 MG capsule, Take 1 capsule (40 mg) by mouth daily, Disp: 90 capsule, Rfl: 3     valACYclovir (VALTREX) 500 MG tablet, TAKE 1 TABLET BY MOUTH EVERY DAY AND INCREASE TO 2 TABLETS DAILY FOR 10 DAYS WITH OUTBREAKS, Disp: 135 tablet, Rfl: 3     losartan (COZAAR) 100 MG tablet, Take 1 tablet (100 mg) by mouth daily, Disp: 90 tablet, Rfl: 3     desonide (DESOWEN) 0.05 % cream, Apply sparingly to Legs, up to twice daily as needed., Disp: 60 g, Rfl: 2     levonorgestrel-ethinyl estradiol (TRIVORA, 28,) per tablet, One tablet continuously x 3 months, then off one week, repeat cycle x one year, Disp: 112 tablet, Rfl: 4     acyclovir (ZOVIRAX) 5 % ointment, Apply  topically 5 times daily. As needed for cold sores., Disp: 30 g, Rfl: 2     SEREVENT DISKUS 50 MCG/DOSE diskus inhaler, , Disp: , Rfl: 2     montelukast (SINGULAIR) 10 MG tablet, , Disp: , Rfl: 2     fluticasone (FLONASE) 50 MCG/ACT nasal spray, , Disp: , Rfl: 1     Ibuprofen (ADVIL PO), , Disp: , Rfl:      ipratropium - albuterol 0.5 mg/2.5 mg/3 mL (DUONEB) 0.5-2.5 (3) MG/3ML nebulization, Take 1 vial (3 mLs) by nebulization every 4 hours as needed for shortness of breath / dyspnea or wheezing, Disp: 30 vial, Rfl: 1     albuterol (2.5 MG/3ML) 0.083% nebulizer solution, Take 3 mLs by nebulization every 4 hours as needed for  "shortness of breath / dyspnea., Disp: 30 vial, Rfl: 1     mometasone (ASMANEX 120 METERED DOSES) 220 MCG/INH inhaler, Inhale 2 puffs into the lungs daily., Disp: 1 Inhaler, Rfl: 0     levocetirizine (XYZAL) 5 MG tablet, Take 1 tablet by mouth every evening., Disp: 30 tablet, Rfl: 12     Olopatadine HCl (PATADAY) 0.2 % SOLN, Apply 1 drop to eye as needed. Each eye, Disp: , Rfl:      VENTOLIN  (90 BASE) MCG/ACT IN AERS, as needed, Disp: , Rfl:     Allergies: No Known Allergies    Social Hx: .   reports that she has never smoked. She has never used smokeless tobacco. She reports that she drinks alcohol. She reports that she does not use illicit drugs.    Family Hx: family history includes CANCER in her maternal grandfather; Connective Tissue Disorder in her mother; DIABETES in her paternal grandfather. There is no history of Hypertension, CEREBROVASCULAR DISEASE, Thyroid Disease, Glaucoma, or Macular Degeneration.    REVIEW OF SYSTEMS: 10 point ROS neg other than the symptoms noted above in the HPI and PMH. Notables include  CONSTITUTIONAL:NEGATIVE for fever, chills, change in weight  INTEGUMENTARY/SKIN: NEGATIVE for worrisome rashes, moles or lesions  MUSCULOSKELETAL:See HPI above  NEURO: NEGATIVE for weakness, dizziness or paresthesias    This document serves as a record of the services and decisions personally performed and made by Johan Sy MD. It was created on his behalf by Zulma Lacy, a trained medical scribe. The creation of this document is based the provider's statements to the medical scribe.    Scribe Zulma Lacy 2:11 PM 12/6/2017    PHYSICAL EXAM:  Resp 16  Ht 1.613 m (5' 3.5\")  Wt 70 kg (154 lb 6.4 oz)  BMI 26.92 kg/m2   GENERAL APPEARANCE: healthy, alert, no distress  SKIN: no suspicious lesions or rashes  NEURO: Normal strength and tone, mentation intact and speech normal  PSYCH:  mentation appears normal and affect normal, not anxious  RESPIRATORY: No increased work " of breathing.    BILATERAL LOWER EXTREMITIES:  Gait: normal  Alignment: neutral, slight varus  No gross deformities or masses.  No Quad atrophy, strength normal.  Intact sensation deep peroneal nerve, superficial peroneal nerve, med/lat tibial nerve, sural nerve, saphenous nerve  Intact EHL, EDL, TA, FHL, GS, quadriceps hamstrings and hip flexors  Toes warm and well perfused, brisk capillary refill. Palpable 2+ dp pulses.  Bilateral calf soft and nttp or squeeze.  No palpable popliteal lymphadenopathy.  DTRs: achilles 2+, patella 2+.  Edema: none  Hips with full, pain-free motion. No irritability with flexion, adduction, and internal rotation.    LEFT KNEE EXAM:    Skin: intact, no ecchymosis or erythema  Squat: 100 %, not limited by pain.    ROM: 0 extension to 135+ flexion  Tight hamstrings on straight leg raise.  Effusion: none  Tender: medial joint line, pes anserine bursa   NTTP lat joint line, anterior or posterior knee  McMurrays: negative    MCL: stable, and non-painful at both 0 and 30 degrees knee flexion  Varus stress: stable, and non-painful at both 0 and 30 degrees knee flexion  Lachmans: neg, firm endpoint  Posterior Drawer stable  Patellofemoral joint:                Apprehension: negative              Crepitations: mild   Grind: positive    RIGHT KNEE EXAM:    Skin: intact, no ecchymosis or erythema  Squat: 100 %, with anterior discomfort      ROM: 0 extension to 130 flexion  Tight hamstrings on straight leg raise.  Effusion: none  Tender: medial joint line, pes anserine bursa, posterior knee over popliteal fossa, medial and lateral gastroc insertions  NTTP med/lat joint line, anterior knee  McMurrays: negative    MCL: stable, and non-painful at both 0 and 30 degrees knee flexion  Varus stress: stable, and non-painful at both 0 and 30 degrees knee flexion  Lachmans: neg, firm endpoint  Posterior Drawer stable  Patellofemoral joint:                Apprehension: negative              Crepitations:  mild   Grind: positive    X-RAY:  3 views right knee from 12/6/2017 were reviewed in clinic today. On my review, no obvious fractures or dislocations. Minimal patello-femoral osteophytes.        Impression:   52 year old female with chronic  right knee pain.      Plan:    * Reviewed imaging with patient. Also, clinical exam findings. Unclear as to source of pain  * Discussed with patient that based on physical exam findings, it is not possible to completely rule out medial meniscus tear, popliteal cyst, muscle tendonitis .  * An MRI of the right knee was ordered for further evaluation.     * Rest  * Activity modification - avoid activities that aggravate symptoms.  * NSAIDS - regular use for inflammation, with food, as long as no contra-indications. Please discuss with pcp if needed.  * Ice twice daily to three times daily.  * Compression wrap  * Elevation of extremity to reduce swelling  * Tylenol as needed for pain    * After having the MRI, I would like the patient to call me so that we can discuss the results as well as how to proceed.       The information in this document, created by a scribe for me, accurately reflects the services I personally performed and the decisions made by me. I have reviewed and approved this document for accuracy.     Johan Sy M.D., M.S.  Dept. of Orthopaedic Surgery  [facility]          Again, thank you for allowing me to participate in the care of your patient.        Sincerely,        Johan Sy MD

## 2017-12-06 NOTE — NURSING NOTE
"Chief Complaint   Patient presents with     Knee Pain     Right knee pain. Onset: over the summer 2017. NKI. Patient is not sure why her knee pain began but it may be from golfing. Pain is posterior. Pain with kneeling down or putting pressure on it/leaning on it. No treatments.        Initial Resp 16  Ht 1.613 m (5' 3.5\")  Wt 70 kg (154 lb 6.4 oz)  BMI 26.92 kg/m2 Estimated body mass index is 26.92 kg/(m^2) as calculated from the following:    Height as of this encounter: 1.613 m (5' 3.5\").    Weight as of this encounter: 70 kg (154 lb 6.4 oz).  Medication Reconciliation: mary jo Holm Certified Medical Assistant    "

## 2017-12-07 ENCOUNTER — RADIANT APPOINTMENT (OUTPATIENT)
Dept: MRI IMAGING | Facility: CLINIC | Age: 52
End: 2017-12-07
Attending: ORTHOPAEDIC SURGERY
Payer: COMMERCIAL

## 2017-12-07 DIAGNOSIS — M25.561 CHRONIC PAIN OF RIGHT KNEE: ICD-10-CM

## 2017-12-07 DIAGNOSIS — G89.29 CHRONIC PAIN OF RIGHT KNEE: ICD-10-CM

## 2017-12-07 PROCEDURE — 73721 MRI JNT OF LWR EXTRE W/O DYE: CPT | Mod: RT | Performed by: RADIOLOGY

## 2017-12-12 ENCOUNTER — TELEPHONE (OUTPATIENT)
Dept: FAMILY MEDICINE | Facility: CLINIC | Age: 52
End: 2017-12-12

## 2017-12-12 NOTE — TELEPHONE ENCOUNTER
Reason for call:  results  Patient called regarding (reason for call): Patient is calling to get her results from her MRI from 12/7/2017  Additional comments: Please call to discuss further      Phone number to reach patient:  Other phone number:  [phone]*    Best Time:  anytime    Can we leave a detailed message on this number?  YES

## 2017-12-13 NOTE — TELEPHONE ENCOUNTER
Called and spoke to Kylie regarding MRI results. No obvious meniscal tears, but some intra-substance degeneration noted. Small popliteal cyst that could be source of posterior knee pain. Also noted moderate-severe patello-femoral degenerative changes. Discussed and recommended Physical Therapy, nsaids, activity modification, compression wrapping, and cortisone injection. She will think about the options and let us know.    Johan Sy M.D., M.S.  Dept. of Orthopaedic Surgery  [facility]

## 2018-04-16 DIAGNOSIS — I10 HYPERTENSION GOAL BP (BLOOD PRESSURE) < 140/90: ICD-10-CM

## 2018-04-16 DIAGNOSIS — Z30.41 ENCOUNTER FOR SURVEILLANCE OF CONTRACEPTIVE PILLS: ICD-10-CM

## 2018-04-16 NOTE — TELEPHONE ENCOUNTER
"Requested Prescriptions   Pending Prescriptions Disp Refills     losartan (COZAAR) 100 MG tablet  Last Written Prescription Date:  04/19/17  Last Fill Quantity: 90,  # refills: 3   Last Office Visit with G, P or [facility] prescribing provider:  12/5/17   Future Office Visit:    90 tablet 3     Sig: Take 1 tablet (100 mg) by mouth daily    Angiotensin-II Receptors Failed    4/16/2018  2:55 PM       Failed - Blood pressure under 140/90 in past 12 months    BP Readings from Last 3 Encounters:   12/05/17 138/90   10/26/17 (!) 156/106   10/16/17 136/78                Passed - Recent (12 mo) or future (30 days) visit within the authorizing provider's specialty    Patient had office visit in the last 12 months or has a visit in the next 30 days with authorizing provider or within the authorizing provider's specialty.  See \"Patient Info\" tab in inbasket, or \"Choose Columns\" in Meds & Orders section of the refill encounter.           Passed - Patient is age 18 or older       Passed - No active pregnancy on record       Passed - Normal serum creatinine on file in past 12 months    Recent Labs   Lab Test  10/16/17   1509   CR  0.82            Passed - Normal serum potassium on file in past 12 months    Recent Labs   Lab Test  10/16/17   1509   POTASSIUM  3.8                   Passed - No positive pregnancy test in past 12 months          "

## 2018-04-20 RX ORDER — LOSARTAN POTASSIUM 100 MG/1
100 TABLET ORAL DAILY
Qty: 30 TABLET | Refills: 0 | Status: SHIPPED | OUTPATIENT
Start: 2018-04-20 | End: 2018-09-12

## 2018-04-20 NOTE — TELEPHONE ENCOUNTER
Routing refill request to provider for review/approval because:  BP is out of range.    Carrillo Silverman RN, BSN

## 2018-04-20 NOTE — TELEPHONE ENCOUNTER
Patient contacted and informed. She declined appointment and will call back another time to schedule.  Percy Washington CMA

## 2018-05-19 ENCOUNTER — HEALTH MAINTENANCE LETTER (OUTPATIENT)
Age: 53
End: 2018-05-19

## 2018-06-05 ENCOUNTER — MYC MEDICAL ADVICE (OUTPATIENT)
Dept: DERMATOLOGY | Facility: CLINIC | Age: 53
End: 2018-06-05

## 2018-06-10 DIAGNOSIS — B00.1 RECURRENT COLD SORES: ICD-10-CM

## 2018-06-10 DIAGNOSIS — K21.9 GASTROESOPHAGEAL REFLUX DISEASE WITHOUT ESOPHAGITIS: ICD-10-CM

## 2018-06-10 NOTE — TELEPHONE ENCOUNTER
"Requested Prescriptions   Pending Prescriptions Disp Refills     omeprazole (PRILOSEC) 40 MG capsule    Last Written Prescription Date:  5/31/17  Last Fill Quantity: 90,  # refills: 3   Last Office Visit with [facility], [facility] or [facility] prescribing provider:  12/5/17   Future Office Visit:      90 capsule 3     Sig: Take 1 capsule (40 mg) by mouth daily    PPI Protocol Passed    6/10/2018 10:40 AM       Passed - Not on Clopidogrel (unless Pantoprazole ordered)       Passed - No diagnosis of osteoporosis on record       Passed - Recent (12 mo) or future (30 days) visit within the authorizing provider's specialty    Patient had office visit in the last 12 months or has a visit in the next 30 days with authorizing provider or within the authorizing provider's specialty.  See \"Patient Info\" tab in inbasket, or \"Choose Columns\" in Meds & Orders section of the refill encounter.           Passed - Patient is age 18 or older       Passed - No active pregnacy on record       Passed - No positive pregnancy test in past 12 months              Henry Faarax  [facility]  "

## 2018-06-11 NOTE — TELEPHONE ENCOUNTER
"                                                                              valACYclovir (VALTREX) 500 MG tablet  Last Written Prescription Date:  04/19/17  Last Fill Quantity: 135,  # refills: 3   Last Office Visit with [facility], P or [facility] prescribing provider:  12/05/17   Future Office Visit:    135 tablet 3     Sig: TAKE 1 TABLET BY MOUTH EVERY DAY AND INCREASE TO 2 TABLETS DAILY FOR 10 DAYS WITH OUTBREAKS    Antivirals for Herpes Protocol Passed    6/11/2018  9:00 AM       Passed - Patient is age 12 or older       Passed - Recent (12 mo) or future (30 days) visit within the authorizing provider's specialty    Patient had office visit in the last 12 months or has a visit in the next 30 days with authorizing provider or within the authorizing provider's specialty.  See \"Patient Info\" tab in inbasket, or \"Choose Columns\" in Meds & Orders section of the refill encounter.           Passed - Normal serum creatinine on file in past 12 months    Recent Labs   Lab Test  10/16/17   1509   CR  0.82               "

## 2018-06-12 RX ORDER — OMEPRAZOLE 40 MG/1
40 CAPSULE, DELAYED RELEASE ORAL DAILY
Qty: 90 CAPSULE | Refills: 0 | Status: SHIPPED | OUTPATIENT
Start: 2018-06-12 | End: 2018-09-05

## 2018-06-12 RX ORDER — VALACYCLOVIR HYDROCHLORIDE 500 MG/1
TABLET, FILM COATED ORAL
Qty: 135 TABLET | Refills: 0 | Status: SHIPPED | OUTPATIENT
Start: 2018-06-12 | End: 2018-10-22

## 2018-06-12 NOTE — TELEPHONE ENCOUNTER
Prescription approved per [facility] Refill Protocol.  Chrissy Thurman RN

## 2018-06-23 ENCOUNTER — HEALTH MAINTENANCE LETTER (OUTPATIENT)
Age: 53
End: 2018-06-23

## 2018-06-29 ENCOUNTER — OFFICE VISIT (OUTPATIENT)
Dept: DERMATOLOGY | Facility: CLINIC | Age: 53
End: 2018-06-29
Payer: COMMERCIAL

## 2018-06-29 DIAGNOSIS — Q82.5 PORT WINE STAIN: ICD-10-CM

## 2018-06-29 DIAGNOSIS — D23.9 DERMATOFIBROMA: ICD-10-CM

## 2018-06-29 DIAGNOSIS — B07.8 OTHER VIRAL WARTS: Primary | ICD-10-CM

## 2018-06-29 DIAGNOSIS — L81.4 SOLAR LENTIGINOSIS: ICD-10-CM

## 2018-06-29 PROCEDURE — 11900 INJECT SKIN LESIONS </W 7: CPT | Performed by: DERMATOLOGY

## 2018-06-29 PROCEDURE — 99202 OFFICE O/P NEW SF 15 MIN: CPT | Mod: 25 | Performed by: DERMATOLOGY

## 2018-06-29 ASSESSMENT — PAIN SCALES - GENERAL: PAINLEVEL: NO PAIN (0)

## 2018-06-29 NOTE — LETTER
6/29/2018         RE: Kylie Arellano  [address]  [place] [address]        Dear Colleague,    Thank you for referring your patient, Kylie Arellano, to the [facility]. Please see a copy of my visit note below.    [facility] Dermatology Note      Dermatology Problem List:  1. Port wine stain  2. Verruca vulgaris s/p candida initiated 6/29/18    Encounter Date: Jun 29, 2018    CC:  Chief Complaint   Patient presents with     Derm Problem     Spots on face and two lumps on left hand         History of Present Illness:  Ms. Kylie Arellano is a 53 year old female who presents for evaluation of lesions of concern on her face as well as her left hand. The patient reports that she has had dark spots on her face before, but now there is one on the left side which appeared very suddenly. She has had flares of adult acne. Additionally, she has lesions on the hands and on the lower leg which she would examined. Otherwise, she has no major concerns and denies new, bleeding, or crusting lesions.    Past Medical History:   Patient Active Problem List   Diagnosis     Moderate persistent asthma without complication     Seasonal allergic rhinitis     GERD (gastroesophageal reflux disease)     Acne     CARDIOVASCULAR SCREENING; LDL GOAL LESS THAN 160     Hypertension goal BP (blood pressure) < 140/90     S/P LEEP (status post loop electrosurgical excision procedure)- АЛЕКСАНДР 2/3     Atopic rhinitis     Past Medical History:   Diagnosis Date     Cervical dysplasia, moderate 10/10    АЛЕКСАНДР 2-3      Cervical high risk HPV (human papillomavirus) test positive 04/19/2017    Neg 16/18     Dermatitis      GERD (gastroesophageal reflux disease)      HSV-1 (herpes simplex virus 1) infection      HTN 2005     Hypertension goal BP (blood pressure) < 140/90 2/10/2011     Mild depression (H)      Mild persistent asthma 2007     S/P LEEP of cervix 10/10     Seasonal allergic rhinitis      Past  Surgical History:   Procedure Laterality Date     BUNIONECTOMY RT/LT  1985    bilateral surgeries, two separate surgeries     CONIZATION LEEP  2010     SINUS SURGERY      multiple surgeries     SURGICAL HISTORY OF -   2003    Breast augmentation     TONSILLECTOMY  1982       Social History:  The patient golfs, started using sunscreen. 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       Family History:  Mother had skin cancer, unknown type  Kept in chart for convenience.     Medications:  Current Outpatient Prescriptions   Medication Sig Dispense Refill     acyclovir (ZOVIRAX) 5 % ointment Apply  topically 5 times daily. As needed for cold sores. 30 g 2     albuterol (2.5 MG/3ML) 0.083% nebulizer solution Take 3 mLs by nebulization every 4 hours as needed for shortness of breath / dyspnea. 30 vial 1     desonide (DESOWEN) 0.05 % cream Apply sparingly to Legs, up to twice daily as needed. 60 g 2     fluticasone (FLONASE) 50 MCG/ACT nasal spray   1     Ibuprofen (ADVIL PO)        ipratropium - albuterol 0.5 mg/2.5 mg/3 mL (DUONEB) 0.5-2.5 (3) MG/3ML nebulization Take 1 vial (3 mLs) by nebulization every 4 hours as needed for shortness of breath / dyspnea or wheezing 30 vial 1     levocetirizine (XYZAL) 5 MG tablet Take 1 tablet by mouth every evening. 30 tablet 12     levonorgestrel-ethinyl estradiol (TRIVORA, 28,) per tablet One tablet continuously x 3 months, then off one week, repeat cycle x one year 112 tablet 4     losartan (COZAAR) 100 MG tablet Take 1 tablet (100 mg) by mouth daily 30 tablet 0     mometasone (ASMANEX 120 METERED DOSES) 220 MCG/INH inhaler Inhale 2 puffs into the lungs daily. 1 Inhaler 0     montelukast (SINGULAIR) 10 MG tablet   2     Olopatadine HCl (PATADAY) 0.2 % SOLN Apply 1 drop to eye as needed. Each eye       omeprazole (PRILOSEC) 40 MG capsule Take 1 capsule (40 mg) by mouth daily 90 capsule 0     SEREVENT DISKUS 50 MCG/DOSE diskus inhaler   2     valACYclovir  (VALTREX) 500 MG tablet TAKE 1 TABLET BY MOUTH EVERY DAY AND INCREASE TO 2 TABLETS DAILY FOR 10 DAYS WITH OUTBREAKS 135 tablet 0     VENTOLIN  (90 BASE) MCG/ACT IN AERS as needed       guaiFENesin-codeine (ROBITUSSIN AC) 100-10 MG/5ML SOLN solution Take 5-10 mLs by mouth every 4 hours as needed for cough (Patient not taking: Reported on 6/29/2018) 180 mL 0     predniSONE (DELTASONE) 10 MG tablet 4 tabs PO QD x 2 days then 3 tabs PO QD x 2 days then 2 tabs PO QD x 2 days then 1 tab PO QD x 2 days (Patient not taking: Reported on 6/29/2018) 20 tablet 0     [DISCONTINUED] ipratropium - albuterol 0.5 mg/2.5 mg/3 mL (DUONEB) 0.5-2.5 (3) MG/3ML neb solution Take 1 vial (3 mLs) by nebulization every 4 hours as needed for shortness of breath / dyspnea or wheezing 30 vial 1       No Known Allergies    Review of Systems:  -Constitutional: The patient is feeling generally well.   -Skin: As above in HPI. No additional skin concerns.    Physical exam:  Vitals: There were no vitals taken for this visit.  GEN: This is a well developed, well-nourished female in no acute distress, in a pleasant mood.    SKIN: Focused examination of the face and left hand, and left lower leg was performed.  -Scattered brown macules on the face, including the left cheek.  -Violaceous patch left nasal side wall  - There are verrucous papules with thrombosed capillaries interrupting dermatoglyphics on the bilateral palms. All 1-3 mm in size.  - There is a firm tan/flesh colored papule that dimples with lateral pressure on the left lower leg.  -No other lesions of concern on areas examined.       Impression/Plan:  1. Solar lentigines, face    Sun precaution was advised including the use of sun screens of SPF 50 or higher (to protect from further aging), sun protective clothing, and avoidance of tanning beds.    2. Port wine stain    Discussed treatment options. Patient declines any treatment today    3.  Verruca vulgaris    Discussed treatment  options, including candida and cryotherapy. Because the patient does not blisters on the hands she elects to try candida.    After cleansing with alcohol, a total of 0.3 cc of candida antigen was injected into a suitable lesion.  The patient tolerated the procedure well.        4. Dermatofibroma, left lower leg    No further intervention.    Follow-up in 4 weeks, earlier for new or changing lesions.       Staff Involved:  Scribe/Staff    Scribe Disclosure  I, Per Garcia, am serving as a scribe to document services personally performed by Dr. Kinga Ocampo MD, based on data collection and the provider's statements to me.     Provider Disclosure:   The documentation recorded by the scribe accurately reflects the services I personally performed and the decisions made by me.    Kinga Ocampo MD    Department of Dermatology  [facility]: Phone: [phone], Fax:[phone]  [facility]: Phone: [phone], Fax: [phone]        Again, thank you for allowing me to participate in the care of your patient.        Sincerely,        Kinga Ocampo MD

## 2018-06-29 NOTE — MR AVS SNAPSHOT
After Visit Summary   6/29/2018    Kylie Arellano    MRN: [id number]           Patient Information     Date Of Birth          1965        Visit Information        Provider Department      6/29/2018 12:30 PM Kinga Ocampo MD [facility]        Today's Diagnoses     Other viral warts    -  1    Dermatofibroma        Port wine stain        Solar lentiginosis           Follow-ups after your visit        Who to contact     If you have questions or need follow up information about today's clinic visit or your schedule please contact [facility] directly at [phone].  Normal or non-critical lab and imaging results will be communicated to you by [facility], letter or phone within 4 business days after the clinic has received the results. If you do not hear from us within 7 days, please contact the clinic through [facility] or phone. If you have a critical or abnormal lab result, we will notify you by phone as soon as possible.  Submit refill requests through [facility] or call your pharmacy and they will forward the refill request to us. Please allow 3 business days for your refill to be completed.          Additional Information About Your Visit        MyChart Information     [facility] [facility] gives you secure access to your electronic health record. If you see a primary care provider, you can also send messages to your care team and make appointments. If you have questions, please call your primary care clinic.  If you do not have a primary care provider, please call [phone] and they will assist you.      [facility] [facility] is an electronic gateway that provides easy, online access to your medical records. With [facility] [facility], you can request a clinic appointment, read your test results, renew a prescription or communicate with your care team.     To access your existing account, please contact your [facility] or call [phone] for assistance.        Care  EveryWhere ID     This is your Care EveryWhere ID. This could be used by other organizations to access your [facility] medical records  [id number]         Blood Pressure from Last 3 Encounters:   12/05/17 138/90   10/26/17 (!) 156/106   10/16/17 136/78    Weight from Last 3 Encounters:   12/06/17 70 kg (154 lb 6.4 oz)   12/05/17 71.1 kg (156 lb 12.8 oz)   10/26/17 69.7 kg (153 lb 9.6 oz)              We Performed the Following     INJECTION INTO SKIN LESIONS <=7        Primary Care Provider Office Phone # Fax #    [facility] [employer] [employer] [phone]       [address]  [facility] [address]        Equal Access to Services     : Hadii aad ku hadasho Soomaali, waaxda luqadaha, qaybta kaalmada adeegyada, waxay idiin hayannalisa messer . So [facility] [phone].    ATENCIÓN: Si habla español, tiene a new disposición servicios gratuitos de asistencia lingüística. Faye al [phone].    We comply with applicable federal civil rights laws and [place] laws. We do not discriminate on the basis of race, color, national origin, age, disability, sex, sexual orientation, or gender identity.            Thank you!     Thank you for choosing [facility]  for your care. Our goal is always to provide you with excellent care. Hearing back from our patients is one way we can continue to improve our services. Please take a few minutes to complete the written survey that you may receive in the mail after your visit with us. Thank you!             Your Updated Medication List - Protect others around you: Learn how to safely use, store and throw away your medicines at www.disposemymeds.org.          This list is accurate as of 6/29/18 12:54 PM.  Always use your most recent med list.                   Brand Name Dispense Instructions for use Diagnosis    acyclovir 5 % ointment    ZOVIRAX    30 g    Apply  topically 5 times daily. As needed for cold sores.     Recurrent cold sores       ADVIL PO           desonide 0.05 % cream    DESOWEN    60 g    Apply sparingly to Legs, up to twice daily as needed.    Intrinsic atopic dermatitis       fluticasone 50 MCG/ACT spray    FLONASE          guaiFENesin-codeine 100-10 MG/5ML Soln solution    ROBITUSSIN AC    180 mL    Take 5-10 mLs by mouth every 4 hours as needed for cough    Cough       ipratropium - albuterol 0.5 mg/2.5 mg/3 mL 0.5-2.5 (3) MG/3ML neb solution    DUONEB    30 vial    Take 1 vial (3 mLs) by nebulization every 4 hours as needed for shortness of breath / dyspnea or wheezing    Acute bronchitis       levocetirizine 5 MG tablet    XYZAL    30 tablet    Take 1 tablet by mouth every evening.    Mild persistent asthma       levonorgestrel-ethinyl estradiol per tablet    TRIVORA (28)    112 tablet    One tablet continuously x 3 months, then off one week, repeat cycle x one year    Encounter for surveillance of contraceptive pills       losartan 100 MG tablet    COZAAR    30 tablet    Take 1 tablet (100 mg) by mouth daily    Hypertension goal BP (blood pressure) < 140/90       mometasone 220 MCG/INH Inhaler    ASMANEX 120 METERED DOSES    1 Inhaler    Inhale 2 puffs into the lungs daily.    Mild persistent asthma       montelukast 10 MG tablet    SINGULAIR          omeprazole 40 MG capsule    priLOSEC    90 capsule    Take 1 capsule (40 mg) by mouth daily    Gastroesophageal reflux disease without esophagitis       PATADAY 0.2 % Soln   Generic drug:  olopatadine HCl      Apply 1 drop to eye as needed. Each eye    HTN (hypertension)       predniSONE 10 MG tablet    DELTASONE    20 tablet    4 tabs PO QD x 2 days then 3 tabs PO QD x 2 days then 2 tabs PO QD x 2 days then 1 tab PO QD x 2 days    Moderate asthma with exacerbation, unspecified whether persistent       SEREVENT DISKUS 50 MCG/DOSE diskus inhaler   Generic drug:  salmeterol           valACYclovir 500 MG tablet    VALTREX    135 tablet    TAKE 1 TABLET BY MOUTH  EVERY DAY AND INCREASE TO 2 TABLETS DAILY FOR 10 DAYS WITH OUTBREAKS    Recurrent cold sores       * VENTOLIN  (90 Base) MCG/ACT Inhaler   Generic drug:  albuterol      as needed        * albuterol (2.5 MG/3ML) 0.083% neb solution     30 vial    Take 3 mLs by nebulization every 4 hours as needed for shortness of breath / dyspnea.    Mild persistent asthma       * Notice:  This list has 2 medication(s) that are the same as other medications prescribed for you. Read the directions carefully, and ask your doctor or other care provider to review them with you.

## 2018-06-29 NOTE — PROGRESS NOTES
[facility] Dermatology Note      Dermatology Problem List:  1. Port wine stain  2. Verruca vulgaris s/p candida initiated 6/29/18    Encounter Date: Jun 29, 2018    CC:  Chief Complaint   Patient presents with     Derm Problem     Spots on face and two lumps on left hand         History of Present Illness:  Ms. Kylie Arellano is a 53 year old female who presents for evaluation of lesions of concern on her face as well as her left hand. The patient reports that she has had dark spots on her face before, but now there is one on the left side which appeared very suddenly. She has had flares of adult acne. Additionally, she has lesions on the hands and on the lower leg which she would examined. Otherwise, she has no major concerns and denies new, bleeding, or crusting lesions.    Past Medical History:   Patient Active Problem List   Diagnosis     Moderate persistent asthma without complication     Seasonal allergic rhinitis     GERD (gastroesophageal reflux disease)     Acne     CARDIOVASCULAR SCREENING; LDL GOAL LESS THAN 160     Hypertension goal BP (blood pressure) < 140/90     S/P LEEP (status post loop electrosurgical excision procedure)- АЛЕКСАНДР 2/3     Atopic rhinitis     Past Medical History:   Diagnosis Date     Cervical dysplasia, moderate 10/10    АЛЕКСАНДР 2-3      Cervical high risk HPV (human papillomavirus) test positive 04/19/2017    Neg 16/18     Dermatitis      GERD (gastroesophageal reflux disease)      HSV-1 (herpes simplex virus 1) infection      HTN 2005     Hypertension goal BP (blood pressure) < 140/90 2/10/2011     Mild depression (H)      Mild persistent asthma 2007     S/P LEEP of cervix 10/10     Seasonal allergic rhinitis      Past Surgical History:   Procedure Laterality Date     BUNIONECTOMY RT/LT  1985    bilateral surgeries, two separate surgeries     CONIZATION LEEP  2010     SINUS SURGERY      multiple surgeries     SURGICAL HISTORY OF -   2003    Breast augmentation      TONSILLECTOMY  1982       Social History:  The patient golfs, started using sunscreen. 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       Family History:  Mother had skin cancer, unknown type  Kept in chart for convenience.     Medications:  Current Outpatient Prescriptions   Medication Sig Dispense Refill     acyclovir (ZOVIRAX) 5 % ointment Apply  topically 5 times daily. As needed for cold sores. 30 g 2     albuterol (2.5 MG/3ML) 0.083% nebulizer solution Take 3 mLs by nebulization every 4 hours as needed for shortness of breath / dyspnea. 30 vial 1     desonide (DESOWEN) 0.05 % cream Apply sparingly to Legs, up to twice daily as needed. 60 g 2     fluticasone (FLONASE) 50 MCG/ACT nasal spray   1     Ibuprofen (ADVIL PO)        ipratropium - albuterol 0.5 mg/2.5 mg/3 mL (DUONEB) 0.5-2.5 (3) MG/3ML nebulization Take 1 vial (3 mLs) by nebulization every 4 hours as needed for shortness of breath / dyspnea or wheezing 30 vial 1     levocetirizine (XYZAL) 5 MG tablet Take 1 tablet by mouth every evening. 30 tablet 12     levonorgestrel-ethinyl estradiol (TRIVORA, 28,) per tablet One tablet continuously x 3 months, then off one week, repeat cycle x one year 112 tablet 4     losartan (COZAAR) 100 MG tablet Take 1 tablet (100 mg) by mouth daily 30 tablet 0     mometasone (ASMANEX 120 METERED DOSES) 220 MCG/INH inhaler Inhale 2 puffs into the lungs daily. 1 Inhaler 0     montelukast (SINGULAIR) 10 MG tablet   2     Olopatadine HCl (PATADAY) 0.2 % SOLN Apply 1 drop to eye as needed. Each eye       omeprazole (PRILOSEC) 40 MG capsule Take 1 capsule (40 mg) by mouth daily 90 capsule 0     SEREVENT DISKUS 50 MCG/DOSE diskus inhaler   2     valACYclovir (VALTREX) 500 MG tablet TAKE 1 TABLET BY MOUTH EVERY DAY AND INCREASE TO 2 TABLETS DAILY FOR 10 DAYS WITH OUTBREAKS 135 tablet 0     VENTOLIN  (90 BASE) MCG/ACT IN AERS as needed       guaiFENesin-codeine (ROBITUSSIN AC) 100-10 MG/5ML SOLN  solution Take 5-10 mLs by mouth every 4 hours as needed for cough (Patient not taking: Reported on 6/29/2018) 180 mL 0     predniSONE (DELTASONE) 10 MG tablet 4 tabs PO QD x 2 days then 3 tabs PO QD x 2 days then 2 tabs PO QD x 2 days then 1 tab PO QD x 2 days (Patient not taking: Reported on 6/29/2018) 20 tablet 0     [DISCONTINUED] ipratropium - albuterol 0.5 mg/2.5 mg/3 mL (DUONEB) 0.5-2.5 (3) MG/3ML neb solution Take 1 vial (3 mLs) by nebulization every 4 hours as needed for shortness of breath / dyspnea or wheezing 30 vial 1       No Known Allergies    Review of Systems:  -Constitutional: The patient is feeling generally well.   -Skin: As above in HPI. No additional skin concerns.    Physical exam:  Vitals: There were no vitals taken for this visit.  GEN: This is a well developed, well-nourished female in no acute distress, in a pleasant mood.    SKIN: Focused examination of the face and left hand, and left lower leg was performed.  -Scattered brown macules on the face, including the left cheek.  -Violaceous patch left nasal side wall  - There are verrucous papules with thrombosed capillaries interrupting dermatoglyphics on the bilateral palms. All 1-3 mm in size.  - There is a firm tan/flesh colored papule that dimples with lateral pressure on the left lower leg.  -No other lesions of concern on areas examined.       Impression/Plan:  1. Solar lentigines, face    Sun precaution was advised including the use of sun screens of SPF 50 or higher (to protect from further aging), sun protective clothing, and avoidance of tanning beds.    2. Port wine stain    Discussed treatment options. Patient declines any treatment today    3.  Verruca vulgaris    Discussed treatment options, including candida and cryotherapy. Because the patient does not blisters on the hands she elects to try candida.    After cleansing with alcohol, a total of 0.3 cc of candida antigen was injected into a suitable lesion.  The patient  tolerated the procedure well.        4. Dermatofibroma, left lower leg    No further intervention.    Follow-up in 4 weeks, earlier for new or changing lesions.       Staff Involved:  Scribe/Staff    Scribe Disclosure  I, Per Garcia, am serving as a scribe to document services personally performed by Dr. Kinga Ocampo MD, based on data collection and the provider's statements to me.     Provider Disclosure:   The documentation recorded by the scribe accurately reflects the services I personally performed and the decisions made by me.    Kinga Ocampo MD    Department of Dermatology  [facility]: Phone: [phone], Fax:[phone]  [facility]: Phone: [phone], Fax: [phone]

## 2018-06-29 NOTE — NURSING NOTE
Kylie Arellano's goals for this visit include:   Chief Complaint   Patient presents with     Derm Problem     Spots on face and two lumps on left hand       She requests these members of her care team be copied on today's visit information: no    PCP: Lynda Goins    Referring Provider:  No referring provider defined for this encounter.    There were no vitals taken for this visit.    Do you need any medication refills at today's visit? None      Anjali Carmona, CMA

## 2018-07-02 DIAGNOSIS — Z30.41 ENCOUNTER FOR SURVEILLANCE OF CONTRACEPTIVE PILLS: ICD-10-CM

## 2018-07-02 NOTE — TELEPHONE ENCOUNTER
Requested Prescriptions   Pending Prescriptions Disp Refills     levonorgestrel-ethinyl estradiol (TRIVORA, 28,) per tablet 112 tablet 4     Sig: One tablet continuously x 3 months, then off one week, repeat cycle x one year    There is no refill protocol information for this order        Last Written Prescription Date:  4/19/17  Last Fill Quantity: 112,  # refills: 4   Last office visit: 12/5/2017 with prescribing provider:  Zeferino   Future Office Visit:   Next 5 appointments (look out 90 days)     Jul 31, 2018  2:45 PM CDT   Return Visit with Kinga Ocampo MD   [facility] ([facility]    [id number] [place] [id number] [phone]

## 2018-07-03 NOTE — TELEPHONE ENCOUNTER
Routing refill request to provider for review/approval because:  Please advise if directions are the same.    Chrissy Thurman RN

## 2018-07-03 NOTE — TELEPHONE ENCOUNTER
"Requested Prescriptions   Pending Prescriptions Disp Refills     levonorgestrel-ethinyl estradiol (TRIVORA, 28,) per tablet 112 tablet 4     Sig: One tablet continuously x 3 months, then off one week, repeat cycle x one year    Contraceptives Protocol Passed    7/2/2018  9:21 AM       Passed - Patient is not a current smoker if age is 35 or older       Passed - Recent (12 mo) or future (30 days) visit within the authorizing provider's specialty    Patient had office visit in the last 12 months or has a visit in the next 30 days with authorizing provider or within the authorizing provider's specialty.  See \"Patient Info\" tab in inbasket, or \"Choose Columns\" in Meds & Orders section of the refill encounter.           Passed - No active pregnancy on record       Passed - No positive pregnancy test in past 12 months          "

## 2018-07-31 ENCOUNTER — OFFICE VISIT (OUTPATIENT)
Dept: DERMATOLOGY | Facility: CLINIC | Age: 53
End: 2018-07-31
Payer: COMMERCIAL

## 2018-07-31 DIAGNOSIS — B07.8 OTHER VIRAL WARTS: Primary | ICD-10-CM

## 2018-07-31 PROCEDURE — 11900 INJECT SKIN LESIONS </W 7: CPT | Performed by: DERMATOLOGY

## 2018-07-31 NOTE — MR AVS SNAPSHOT
After Visit Summary   7/31/2018    Kylie Arellano    MRN: [id number]           Patient Information     Date Of Birth          1965        Visit Information        Provider Department      7/31/2018 2:45 PM Kinga Ocampo MD [facility]        Today's Diagnoses     Other viral warts    -  1       Follow-ups after your visit        Follow-up notes from your care team     Return in about 3 weeks (around 8/21/2018).      Your next 10 appointments already scheduled     Aug 21, 2018  3:30 PM CDT   Return Visit with Kinga Ocampo MD   [facility] ([facility]    [address] [place] [address] [phone]              Who to contact     If you have questions or need follow up information about today's clinic visit or your schedule please contact [facility] directly at [phone].  Normal or non-critical lab and imaging results will be communicated to you by [employer], letter or phone within 4 business days after the clinic has received the results. If you do not hear from us within 7 days, please contact the clinic through [employer] or phone. If you have a critical or abnormal lab result, we will notify you by phone as soon as possible.  Submit refill requests through [employer] or call your pharmacy and they will forward the refill request to us. Please allow 3 business days for your refill to be completed.          Additional Information About Your Visit        MyChart Information     [employer] gives you secure access to your electronic health record. If you see a primary care provider, you can also send messages to your care team and make appointments. If you have questions, please call your primary care clinic.  If you do not have a primary care provider, please call [phone] and they will assist you.      [employer] is an electronic gateway that provides easy, online access to your medical records. With [employer], you can  request a clinic appointment, read your test results, renew a prescription or communicate with your care team.     To access your existing account, please contact your [facility] or call [phone] for assistance.        Care EveryWhere ID     This is your Care EveryWhere ID. This could be used by other organizations to access your [facility] medical records  [id number]         Blood Pressure from Last 3 Encounters:   12/05/17 138/90   10/26/17 (!) 156/106   10/16/17 136/78    Weight from Last 3 Encounters:   12/06/17 70 kg (154 lb 6.4 oz)   12/05/17 71.1 kg (156 lb 12.8 oz)   10/26/17 69.7 kg (153 lb 9.6 oz)              We Performed the Following     INJECTION INTO SKIN LESIONS <=7        Primary Care Provider Office Phone # Fax #    [facility] [employer] [phone]       [address]  [facility] [address]        Equal Access to Services     [facility] : Hadii aad ku hadasho Soomaali, waaxda luqadaha, qaybta kaalmada adeegyada, waxay idiin hayaan yony kharash gui . So [facility] [phone].    ATENCIÓN: Si habla español, tiene a new disposición servicios gratuitos de asistencia lingüística. [facility] [phone].    We comply with applicable federal civil rights laws and [place] laws. We do not discriminate on the basis of race, color, national origin, age, disability, sex, sexual orientation, or gender identity.            Thank you!     Thank you for choosing [facility]  for your care. Our goal is always to provide you with excellent care. Hearing back from our patients is one way we can continue to improve our services. Please take a few minutes to complete the written survey that you may receive in the mail after your visit with us. Thank you!             Your Updated Medication List - Protect others around you: Learn how to safely use, store and throw away your medicines at www.disposemymeds.org.          This list is  accurate as of 7/31/18  3:33 PM.  Always use your most recent med list.                   Brand Name Dispense Instructions for use Diagnosis    acyclovir 5 % ointment    ZOVIRAX    30 g    Apply  topically 5 times daily. As needed for cold sores.    Recurrent cold sores       ADVIL PO           desonide 0.05 % cream    DESOWEN    60 g    Apply sparingly to Legs, up to twice daily as needed.    Intrinsic atopic dermatitis       fluticasone 50 MCG/ACT spray    FLONASE          guaiFENesin-codeine 100-10 MG/5ML Soln solution    ROBITUSSIN AC    180 mL    Take 5-10 mLs by mouth every 4 hours as needed for cough    Cough       ipratropium - albuterol 0.5 mg/2.5 mg/3 mL 0.5-2.5 (3) MG/3ML neb solution    DUONEB    30 vial    Take 1 vial (3 mLs) by nebulization every 4 hours as needed for shortness of breath / dyspnea or wheezing    Acute bronchitis       levocetirizine 5 MG tablet    XYZAL    30 tablet    Take 1 tablet by mouth every evening.    Mild persistent asthma       levonorgestrel-ethinyl estradiol per tablet    TRIVORA (28)    112 tablet    One tablet continuously x 3 months, then off one week, repeat cycle x one year    Encounter for surveillance of contraceptive pills       losartan 100 MG tablet    COZAAR    30 tablet    Take 1 tablet (100 mg) by mouth daily    Hypertension goal BP (blood pressure) < 140/90       mometasone 220 MCG/INH Inhaler    ASMANEX 120 METERED DOSES    1 Inhaler    Inhale 2 puffs into the lungs daily.    Mild persistent asthma       montelukast 10 MG tablet    SINGULAIR          omeprazole 40 MG capsule    priLOSEC    90 capsule    Take 1 capsule (40 mg) by mouth daily    Gastroesophageal reflux disease without esophagitis       PATADAY 0.2 % Soln   Generic drug:  olopatadine HCl      Apply 1 drop to eye as needed. Each eye    HTN (hypertension)       predniSONE 10 MG tablet    DELTASONE    20 tablet    4 tabs PO QD x 2 days then 3 tabs PO QD x 2 days then 2 tabs PO QD x 2 days then 1  tab PO QD x 2 days    Moderate asthma with exacerbation, unspecified whether persistent       SEREVENT DISKUS 50 MCG/DOSE diskus inhaler   Generic drug:  salmeterol           valACYclovir 500 MG tablet    VALTREX    135 tablet    TAKE 1 TABLET BY MOUTH EVERY DAY AND INCREASE TO 2 TABLETS DAILY FOR 10 DAYS WITH OUTBREAKS    Recurrent cold sores       * VENTOLIN  (90 Base) MCG/ACT Inhaler   Generic drug:  albuterol      as needed        * albuterol (2.5 MG/3ML) 0.083% neb solution     30 vial    Take 3 mLs by nebulization every 4 hours as needed for shortness of breath / dyspnea.    Mild persistent asthma       * Notice:  This list has 2 medication(s) that are the same as other medications prescribed for you. Read the directions carefully, and ask your doctor or other care provider to review them with you.

## 2018-07-31 NOTE — LETTER
7/31/2018         RE: Kylie Arellano  [address]  [place] [address]        Dear Colleague,    Thank you for referring your patient, Kylie Arellano, to the [facility]. Please see a copy of my visit note below.    [facility] Dermatology Note      Dermatology Problem List:  1. Port wine stain  2. Verruca vulgaris s/p candida initiated 6/29/18    Encounter Date: Jul 31, 2018    CC:  Chief Complaint   Patient presents with     Wart     Bilateral hands - Candida         History of Present Illness:  Ms. Kylie Arellano is a 53 year old female who presents for follow up regarding candida injections for warts on her bilateral palms. She was last seen in dermatology on 6/29/18 at which time she initiated treatment of these warts with her first candida injection. Today, the patient reports that  Her warts are the same. She is back for candida. Does not want injections.    Past Medical History:   Patient Active Problem List   Diagnosis     Moderate persistent asthma without complication     Seasonal allergic rhinitis     GERD (gastroesophageal reflux disease)     Acne     CARDIOVASCULAR SCREENING; LDL GOAL LESS THAN 160     Hypertension goal BP (blood pressure) < 140/90     S/P LEEP (status post loop electrosurgical excision procedure)- ЛАЕКСАНДР 2/3     Atopic rhinitis     Past Medical History:   Diagnosis Date     Cervical dysplasia, moderate 10/10    АЛЕКСАНДР 2-3      Cervical high risk HPV (human papillomavirus) test positive 04/19/2017    Neg 16/18     Dermatitis      GERD (gastroesophageal reflux disease)      HSV-1 (herpes simplex virus 1) infection      HTN 2005     Hypertension goal BP (blood pressure) < 140/90 2/10/2011     Mild depression (H)      Mild persistent asthma 2007     S/P LEEP of cervix 10/10     Seasonal allergic rhinitis      Past Surgical History:   Procedure Laterality Date     BUNIONECTOMY RT/LT  1985    bilateral surgeries, two separate surgeries      CONIZATION LEE  2010     SINUS SURGERY      multiple surgeries     SURGICAL HISTORY OF -   2003    Breast augmentation     TONSILLECTOMY  1982       Social History:  The patient golfs, started using sunscreen. 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       Family History:  Mother had skin cancer, unknown type  Kept in chart for convenience.     Medications:  Current Outpatient Prescriptions   Medication Sig Dispense Refill     acyclovir (ZOVIRAX) 5 % ointment Apply  topically 5 times daily. As needed for cold sores. 30 g 2     albuterol (2.5 MG/3ML) 0.083% nebulizer solution Take 3 mLs by nebulization every 4 hours as needed for shortness of breath / dyspnea. 30 vial 1     desonide (DESOWEN) 0.05 % cream Apply sparingly to Legs, up to twice daily as needed. 60 g 2     fluticasone (FLONASE) 50 MCG/ACT nasal spray   1     guaiFENesin-codeine (ROBITUSSIN AC) 100-10 MG/5ML SOLN solution Take 5-10 mLs by mouth every 4 hours as needed for cough 180 mL 0     Ibuprofen (ADVIL PO)        ipratropium - albuterol 0.5 mg/2.5 mg/3 mL (DUONEB) 0.5-2.5 (3) MG/3ML nebulization Take 1 vial (3 mLs) by nebulization every 4 hours as needed for shortness of breath / dyspnea or wheezing 30 vial 1     levocetirizine (XYZAL) 5 MG tablet Take 1 tablet by mouth every evening. 30 tablet 12     levonorgestrel-ethinyl estradiol (TRIVORA, 28,) per tablet One tablet continuously x 3 months, then off one week, repeat cycle x one year 112 tablet 4     losartan (COZAAR) 100 MG tablet Take 1 tablet (100 mg) by mouth daily 30 tablet 0     mometasone (ASMANEX 120 METERED DOSES) 220 MCG/INH inhaler Inhale 2 puffs into the lungs daily. 1 Inhaler 0     montelukast (SINGULAIR) 10 MG tablet   2     Olopatadine HCl (PATADAY) 0.2 % SOLN Apply 1 drop to eye as needed. Each eye       omeprazole (PRILOSEC) 40 MG capsule Take 1 capsule (40 mg) by mouth daily 90 capsule 0     predniSONE (DELTASONE) 10 MG tablet 4 tabs PO QD x 2 days  then 3 tabs PO QD x 2 days then 2 tabs PO QD x 2 days then 1 tab PO QD x 2 days 20 tablet 0     SEREVENT DISKUS 50 MCG/DOSE diskus inhaler   2     valACYclovir (VALTREX) 500 MG tablet TAKE 1 TABLET BY MOUTH EVERY DAY AND INCREASE TO 2 TABLETS DAILY FOR 10 DAYS WITH OUTBREAKS 135 tablet 0     VENTOLIN  (90 BASE) MCG/ACT IN AERS as needed         No Known Allergies    Review of Systems:  -Constitutional: The patient is feeling generally well.   -Skin: As above in HPI. No additional skin concerns.    Physical exam:  Vitals: There were no vitals taken for this visit.  GEN: This is a well developed, well-nourished female in no acute distress, in a pleasant mood.    SKIN: Focused examination of the face and left hand, and left lower leg was performed.  - There are verrucous papules with thrombosed capillaries interrupting dermatoglyphics on the bilateral palms. All 1-3 mm in size.These r flat and unchanged  -No other lesions of concern on areas examined.       Impression/Plan:    1. Port wine stain    Not addressed at this visit.     2. .  Verruca vulgaris-second injection    Candida injection #2: After cleansing with alcohol, a total of 0.3 cc of candida antigen was injected into a suitable lesion.  The patient tolerated the procedure well.    She waited approx 15 min for monitoring prior to leaving      Follow-up in 4 weeks, earlier for new or changing lesions.       Staff Involved:  Scribe/Staff    Scribe Disclosure  I, Per Garcia, am serving as a scribe to document services personally performed by Dr. Kinga Ocampo MD, based on data collection and the provider's statements to me.     Provider Disclosure:   The documentation recorded by the scribe accurately reflects the services I personally performed and the decisions made by me.    Kinga Ocampo MD    Department of Dermatology  [facility]: Phone: [phone],  Fax:[phone]  [facility]: Phone: [phone], Fax: [phone]        Again, thank you for allowing me to participate in the care of your patient.        Sincerely,        Kinga Ocampo MD

## 2018-07-31 NOTE — NURSING NOTE
@Kylie Arellano's goals for this visit include:   Chief Complaint   Patient presents with     Wart     Bilateral hands - Candida       She requests these members of her care team be copied on today's visit information: NO    PCP: Lynda Goins    Referring Provider:  No referring provider defined for this encounter.    There were no vitals taken for this visit.    Do you need any medication refills at today's visit? NO    Louise Celaya CMA

## 2018-07-31 NOTE — PROGRESS NOTES
[facility] Dermatology Note      Dermatology Problem List:  1. Port wine stain  2. Verruca vulgaris s/p candida initiated 6/29/18    Encounter Date: Jul 31, 2018    CC:  Chief Complaint   Patient presents with     Wart     Bilateral hands - Candida         History of Present Illness:  Ms. Kylie Arellano is a 53 year old female who presents for follow up regarding candida injections for warts on her bilateral palms. She was last seen in dermatology on 6/29/18 at which time she initiated treatment of these warts with her first candida injection. Today, the patient reports that  Her warts are the same. She is back for candida. Does not want injections.    Past Medical History:   Patient Active Problem List   Diagnosis     Moderate persistent asthma without complication     Seasonal allergic rhinitis     GERD (gastroesophageal reflux disease)     Acne     CARDIOVASCULAR SCREENING; LDL GOAL LESS THAN 160     Hypertension goal BP (blood pressure) < 140/90     S/P LEEP (status post loop electrosurgical excision procedure)- АЛЕКСАНДР 2/3     Atopic rhinitis     Past Medical History:   Diagnosis Date     Cervical dysplasia, moderate 10/10    АЛЕКСАНДР 2-3      Cervical high risk HPV (human papillomavirus) test positive 04/19/2017    Neg 16/18     Dermatitis      GERD (gastroesophageal reflux disease)      HSV-1 (herpes simplex virus 1) infection      HTN 2005     Hypertension goal BP (blood pressure) < 140/90 2/10/2011     Mild depression (H)      Mild persistent asthma 2007     S/P LEEP of cervix 10/10     Seasonal allergic rhinitis      Past Surgical History:   Procedure Laterality Date     BUNIONECTOMY RT/LT  1985    bilateral surgeries, two separate surgeries     CONIZATION LEEP  2010     SINUS SURGERY      multiple surgeries     SURGICAL HISTORY OF -   2003    Breast augmentation     TONSILLECTOMY  1982       Social History:  The patient golfs, started using sunscreen. 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       Family History:  Mother had skin cancer, unknown type  Kept in chart for convenience.     Medications:  Current Outpatient Prescriptions   Medication Sig Dispense Refill     acyclovir (ZOVIRAX) 5 % ointment Apply  topically 5 times daily. As needed for cold sores. 30 g 2     albuterol (2.5 MG/3ML) 0.083% nebulizer solution Take 3 mLs by nebulization every 4 hours as needed for shortness of breath / dyspnea. 30 vial 1     desonide (DESOWEN) 0.05 % cream Apply sparingly to Legs, up to twice daily as needed. 60 g 2     fluticasone (FLONASE) 50 MCG/ACT nasal spray   1     guaiFENesin-codeine (ROBITUSSIN AC) 100-10 MG/5ML SOLN solution Take 5-10 mLs by mouth every 4 hours as needed for cough 180 mL 0     Ibuprofen (ADVIL PO)        ipratropium - albuterol 0.5 mg/2.5 mg/3 mL (DUONEB) 0.5-2.5 (3) MG/3ML nebulization Take 1 vial (3 mLs) by nebulization every 4 hours as needed for shortness of breath / dyspnea or wheezing 30 vial 1     levocetirizine (XYZAL) 5 MG tablet Take 1 tablet by mouth every evening. 30 tablet 12     levonorgestrel-ethinyl estradiol (TRIVORA, 28,) per tablet One tablet continuously x 3 months, then off one week, repeat cycle x one year 112 tablet 4     losartan (COZAAR) 100 MG tablet Take 1 tablet (100 mg) by mouth daily 30 tablet 0     mometasone (ASMANEX 120 METERED DOSES) 220 MCG/INH inhaler Inhale 2 puffs into the lungs daily. 1 Inhaler 0     montelukast (SINGULAIR) 10 MG tablet   2     Olopatadine HCl (PATADAY) 0.2 % SOLN Apply 1 drop to eye as needed. Each eye       omeprazole (PRILOSEC) 40 MG capsule Take 1 capsule (40 mg) by mouth daily 90 capsule 0     predniSONE (DELTASONE) 10 MG tablet 4 tabs PO QD x 2 days then 3 tabs PO QD x 2 days then 2 tabs PO QD x 2 days then 1 tab PO QD x 2 days 20 tablet 0     SEREVENT DISKUS 50 MCG/DOSE diskus inhaler   2     valACYclovir (VALTREX) 500 MG tablet TAKE 1 TABLET BY MOUTH EVERY DAY AND INCREASE TO 2 TABLETS DAILY FOR 10  DAYS WITH OUTBREAKS 135 tablet 0     VENTOLIN  (90 BASE) MCG/ACT IN AERS as needed         No Known Allergies    Review of Systems:  -Constitutional: The patient is feeling generally well.   -Skin: As above in HPI. No additional skin concerns.    Physical exam:  Vitals: There were no vitals taken for this visit.  GEN: This is a well developed, well-nourished female in no acute distress, in a pleasant mood.    SKIN: Focused examination of the face and left hand, and left lower leg was performed.  - There are verrucous papules with thrombosed capillaries interrupting dermatoglyphics on the bilateral palms. All 1-3 mm in size.These r flat and unchanged  -No other lesions of concern on areas examined.       Impression/Plan:    1. Port wine stain    Not addressed at this visit.     2. .  Verruca vulgaris-second injection    Candida injection #2: After cleansing with alcohol, a total of 0.3 cc of candida antigen was injected into a suitable lesion.  The patient tolerated the procedure well.    She waited approx 15 min for monitoring prior to leaving      Follow-up in 4 weeks, earlier for new or changing lesions.       Staff Involved:  Scribe/Staff    Scribe Disclosure  I, Per Garcia, am serving as a scribe to document services personally performed by Dr. Kinga Ocampo MD, based on data collection and the provider's statements to me.     Provider Disclosure:   The documentation recorded by the scribe accurately reflects the services I personally performed and the decisions made by me.    Kinga Ocampo MD    Department of Dermatology  [facility]: Phone: [phone], Fax:[phone]  [facility]: Phone: [phone], Fax: [phone]

## 2018-08-09 DIAGNOSIS — B00.1 RECURRENT COLD SORES: ICD-10-CM

## 2018-08-09 RX ORDER — ACYCLOVIR 50 MG/G
OINTMENT TOPICAL
Qty: 30 G | Refills: 2 | Status: SHIPPED | OUTPATIENT
Start: 2018-08-09 | End: 2018-09-12

## 2018-08-09 NOTE — TELEPHONE ENCOUNTER
Requested Prescriptions   Pending Prescriptions Disp Refills     acyclovir (ZOVIRAX) 5 % ointment        Last Written Prescription Date:  04/19/17  Last Fill Quantity: 30g,   # refills: 2  Last Office Visit: 12/05/17  Future Office visit:    Next 5 appointments (look out 90 days)     Aug 21, 2018  3:30 PM CDT   Return Visit with Kinga Ocampo MD   [facility]    [address] [place] [id number] [phone]                   Routing refill request to provider for review/approval because:  Drug not on the G, P or [facility] refill protocol or controlled substance 30 g 2     Sig: Apply  topically 5 times daily. As needed for cold sores.    There is no refill protocol information for this order

## 2018-08-09 NOTE — TELEPHONE ENCOUNTER
Routing refill request to provider for review/approval because:  Drug not on the [facility] refill protocol     Imelda Correa RN  [facility]

## 2018-08-09 NOTE — TELEPHONE ENCOUNTER
Reason for Call:  Medication or medication refill:    Do you use a [facility]?  Name of the pharmacy and phone number for the current request:  [id number] - [place], [place] - [address] . . [phone]    Name of the medication requested: acyclovir (ZOVIRAX) 5 % ointment    Can we leave a detailed message on this number? YES    Best Time: anytime    Call taken on 8/9/2018 at 12:54 PM by Malcom Stevens

## 2018-08-10 ENCOUNTER — TELEPHONE (OUTPATIENT)
Dept: FAMILY MEDICINE | Facility: CLINIC | Age: 53
End: 2018-08-10

## 2018-08-10 NOTE — TELEPHONE ENCOUNTER
Does not appear like any topical antiviral is covered. She will have to use over the counter creams like Abreva. Thanks!  Olivia

## 2018-09-04 NOTE — TELEPHONE ENCOUNTER
Prior authorization denied, please advise.  Forms placed on providers desk to review.  Jana SEN

## 2018-09-05 DIAGNOSIS — K21.9 GASTROESOPHAGEAL REFLUX DISEASE WITHOUT ESOPHAGITIS: ICD-10-CM

## 2018-09-05 NOTE — TELEPHONE ENCOUNTER
"Requested Prescriptions   Pending Prescriptions Disp Refills     omeprazole (PRILOSEC) 40 MG capsule [Pharmacy Med Name: Omeprazole Oral Capsule Delayed Release 40 MG]  Last Written Prescription Date:  06/12/18  Last Fill Quantity: 90,  # refills: 0   Last Office Visit with LYLE BLUE or [facility] prescribing provider:  12/05/17Candace   Future Office Visit:    Next 5 appointments (look out 90 days)     Sep 12, 2018  8:20 AM CDT   MyChart New Injury with Lynda Painting MD   [facility] ([facility])    [address] [address]   [phone]            Nov 13, 2018  4:15 PM CST   Return Visit with Kinga Ocampo MD   [facility] ([facility])    [address] [address] [place] [id number] [phone]                90 capsule 0     Sig: Take 1 capsule (40 mg) by mouth daily    PPI Protocol Passed    9/5/2018 12:11 PM       Passed - Not on Clopidogrel (unless Pantoprazole ordered)       Passed - No diagnosis of osteoporosis on record       Passed - Recent (12 mo) or future (30 days) visit within the authorizing provider's specialty    Patient had office visit in the last 12 months or has a visit in the next 30 days with authorizing provider or within the authorizing provider's specialty.  See \"Patient Info\" tab in inbasket, or \"Choose Columns\" in Meds & Orders section of the refill encounter.           Passed - Patient is age 18 or older       Passed - No active pregnacy on record       Passed - No positive pregnancy test in past 12 months          "

## 2018-09-10 RX ORDER — OMEPRAZOLE 40 MG/1
CAPSULE, DELAYED RELEASE ORAL
Qty: 90 CAPSULE | Refills: 0 | Status: SHIPPED | OUTPATIENT
Start: 2018-09-10 | End: 2019-01-03

## 2018-09-10 NOTE — TELEPHONE ENCOUNTER
Prescription approved per [facility] Refill Protocol.    Adelaida Nevarez RN, [facility]

## 2018-09-12 ENCOUNTER — OFFICE VISIT (OUTPATIENT)
Dept: FAMILY MEDICINE | Facility: CLINIC | Age: 53
End: 2018-09-12
Payer: COMMERCIAL

## 2018-09-12 ENCOUNTER — RADIANT APPOINTMENT (OUTPATIENT)
Dept: GENERAL RADIOLOGY | Facility: CLINIC | Age: 53
End: 2018-09-12
Attending: FAMILY MEDICINE
Payer: COMMERCIAL

## 2018-09-12 VITALS
WEIGHT: 148.6 LBS | HEART RATE: 107 BPM | RESPIRATION RATE: 16 BRPM | BODY MASS INDEX: 25.37 KG/M2 | HEIGHT: 64 IN | SYSTOLIC BLOOD PRESSURE: 164 MMHG | OXYGEN SATURATION: 98 % | TEMPERATURE: 97.8 F | DIASTOLIC BLOOD PRESSURE: 102 MMHG

## 2018-09-12 DIAGNOSIS — M25.511 ACUTE PAIN OF RIGHT SHOULDER: ICD-10-CM

## 2018-09-12 DIAGNOSIS — M25.511 ACUTE PAIN OF RIGHT SHOULDER: Primary | ICD-10-CM

## 2018-09-12 DIAGNOSIS — I10 HYPERTENSION GOAL BP (BLOOD PRESSURE) < 140/90: ICD-10-CM

## 2018-09-12 DIAGNOSIS — Z12.4 SCREENING FOR MALIGNANT NEOPLASM OF CERVIX: ICD-10-CM

## 2018-09-12 DIAGNOSIS — Z12.11 SCREEN FOR COLON CANCER: ICD-10-CM

## 2018-09-12 DIAGNOSIS — J45.30 MILD PERSISTENT ASTHMA WITHOUT COMPLICATION: ICD-10-CM

## 2018-09-12 LAB
ALBUMIN SERPL-MCNC: 3.2 G/DL (ref 3.4–5)
ALP SERPL-CCNC: 84 U/L (ref 40–150)
ALT SERPL W P-5'-P-CCNC: 34 U/L (ref 0–50)
ANION GAP SERPL CALCULATED.3IONS-SCNC: 7 MMOL/L (ref 3–14)
AST SERPL W P-5'-P-CCNC: 47 U/L (ref 0–45)
BILIRUB SERPL-MCNC: 0.9 MG/DL (ref 0.2–1.3)
BUN SERPL-MCNC: 14 MG/DL (ref 7–30)
CALCIUM SERPL-MCNC: 8.5 MG/DL (ref 8.5–10.1)
CHLORIDE SERPL-SCNC: 103 MMOL/L (ref 94–109)
CO2 SERPL-SCNC: 30 MMOL/L (ref 20–32)
CREAT SERPL-MCNC: 0.88 MG/DL (ref 0.52–1.04)
GFR SERPL CREATININE-BSD FRML MDRD: 68 ML/MIN/1.7M2
GLUCOSE SERPL-MCNC: 136 MG/DL (ref 70–99)
POTASSIUM SERPL-SCNC: 3.6 MMOL/L (ref 3.4–5.3)
PROT SERPL-MCNC: 6.4 G/DL (ref 6.8–8.8)
SODIUM SERPL-SCNC: 140 MMOL/L (ref 133–144)

## 2018-09-12 PROCEDURE — 99214 OFFICE O/P EST MOD 30 MIN: CPT | Performed by: FAMILY MEDICINE

## 2018-09-12 PROCEDURE — 80053 COMPREHEN METABOLIC PANEL: CPT | Performed by: FAMILY MEDICINE

## 2018-09-12 PROCEDURE — 36415 COLL VENOUS BLD VENIPUNCTURE: CPT | Performed by: FAMILY MEDICINE

## 2018-09-12 PROCEDURE — 73030 X-RAY EXAM OF SHOULDER: CPT | Mod: RT

## 2018-09-12 RX ORDER — LEVOCETIRIZINE DIHYDROCHLORIDE 5 MG/1
5 TABLET, FILM COATED ORAL EVERY EVENING
Qty: 90 TABLET | Refills: 3 | Status: SHIPPED | OUTPATIENT
Start: 2018-09-12 | End: 2019-06-25

## 2018-09-12 RX ORDER — LOSARTAN POTASSIUM 100 MG/1
100 TABLET ORAL DAILY
Qty: 90 TABLET | Refills: 0 | Status: SHIPPED | OUTPATIENT
Start: 2018-09-12 | End: 2018-12-08

## 2018-09-12 ASSESSMENT — PAIN SCALES - GENERAL: PAINLEVEL: MODERATE PAIN (4)

## 2018-09-12 NOTE — MR AVS SNAPSHOT
After Visit Summary   9/12/2018    Kylie Arellano    MRN: [id number]           Patient Information     Date Of Birth          1965        Visit Information        Provider Department      9/12/2018 8:20 AM Lynda Goins MD [facility]        Today's Diagnoses     Acute pain of right shoulder    -  1    Screen for colon cancer        Screening for malignant neoplasm of cervix        Mild persistent asthma without complication        Hypertension goal BP (blood pressure) < 140/90          Care Instructions    Call [phone] to schedule your MRI and schedule your pap smear ASAP.  At [facility], we strive to deliver an exceptional experience to you, every time we see you.  If you receive a survey in the mail, please send us back your thoughts. We really do value your feedback.    Based on your medical history, these are the current health maintenance/preventive care services that you are due for (some may have been done at this visit.)  Health Maintenance Due   Topic Date Due     EYE EXAM Q1 YEAR  11/14/2015     COLON CANCER SCREEN (SYSTEM ASSIGNED)  02/01/2017     ASTHMA ACTION PLAN Q1 YR  03/07/2017     CMP Q1 YR  04/12/2018     BMP Q6 MOS  04/16/2018     ASTHMA CONTROL TEST Q6 MOS  04/16/2018     HPV Q1 Year  04/19/2018     PAP Q1 YR DIAGNOSTIC  04/19/2018     PHQ-2 Q1 YR  04/19/2018     INFLUENZA VACCINE (1) 09/01/2018       Suggested websites for health information:  Www.[employer] : Up to date and easily searchable information on multiple topics.  Www.medlineplus.gov : medication info, interactive tutorials, watch real surgeries online  Www.familydoctor.org : good info from the Academy of Family Physicians  Www.cdc.gov : public health info, travel advisories, epidemics (H1N1)  Www.aap.org : children's health info, normal development, vaccinations  Www.health.state.[place].us : [place] dept of health, public health issues in [place], N1N1    Your  care team:                            Family Medicine Internal Medicine   MD Shimon Blevins MD Shantel Branch-Fleming, MD Katya Georgiev PA-C Megan Hill, APRMAHSA Reich MD Pediatrics   JEANNE Tay, MD Janice Butler APRN CNP   MD Aminata Locke MD Deborah Mielke, MD Kim Thein, APR[facility]      Clinic hours: Monday - Thursday 7 am-7 pm; Fridays 7 am-5 pm.   Urgent care: Monday - Friday 11 am-9 pm; Saturday and Sunday 9 am-5 pm.  Pharmacy : Monday -Thursday 8 am-8 pm; Friday 8 am-6 pm; Saturday and Sunday 9 am-5 pm.     Clinic: [phone]   Pharmacy: [phone]              Follow-ups after your visit        Follow-up notes from your care team     Return in about 4 weeks (around 10/10/2018) for Ancillary Visit, BP Recheck.      Your next 10 appointments already scheduled     Nov 13, 2018  4:15 PM CST   [facility]    [address] [place] [id number] [phone]              Future tests that were ordered for you today     Open Future Orders        Priority Expected Expires Ordered    XR Shoulder Right 2 Views Routine 9/12/2018 9/12/2019 9/12/2018    MR Shoulder Right w/o Contrast Routine  9/12/2019 9/12/2018            Who to contact     If you have questions or need follow up information about today's clinic visit or your schedule please contact [facility] directly at [phone].  Normal or non-critical lab and imaging results will be communicated to you by MyChart, letter or phone within 4 business days after the clinic has received the results. If you do not hear from us within 7 days, please contact the clinic through MyChart or phone. If you have a critical or abnormal lab result, we will notify you by phone as soon as possible.  Submit refill requests through [employer] or call your pharmacy and they  "will forward the refill request to us. Please allow 3 business days for your refill to be completed.          Additional Information About Your Visit        [employer] Information     [employer] gives you secure access to your electronic health record. If you see a primary care provider, you can also send messages to your care team and make appointments. If you have questions, please call your primary care clinic.  If you do not have a primary care provider, please call [phone] and they will assist you.        Care EveryWhere ID     This is your Care EveryWhere ID. This could be used by other organizations to access your [facility] medical records  [id number]        Your Vitals Were     Pulse Temperature Respirations Height Pulse Oximetry Breastfeeding?    107 97.8  F (36.6  C) (Oral) 16 5' 3.78\" (1.62 m) 98% No    BMI (Body Mass Index)                   25.68 kg/m2            Blood Pressure from Last 3 Encounters:   09/12/18 (!) 164/102   12/05/17 138/90   10/26/17 (!) 156/106    Weight from Last 3 Encounters:   09/12/18 148 lb 9.6 oz (67.4 kg)   12/06/17 154 lb 6.4 oz (70 kg)   12/05/17 156 lb 12.8 oz (71.1 kg)              We Performed the Following     Asthma Action Plan (AAP)     COMPREHENSIVE METABOLIC PANEL          Today's Medication Changes          These changes are accurate as of 9/12/18  8:47 AM.  If you have any questions, ask your nurse or doctor.               Stop taking these medicines if you haven't already. Please contact your care team if you have questions.     acyclovir 5 % ointment   Commonly known as:  ZOVIRAX   Stopped by:  Lynda Goins MD                Where to get your medicines      These medications were sent to [id number] - [place], [place] - [address]  [address] [address]     Phone:  [phone]     levocetirizine 5 MG tablet    losartan 100 MG tablet    mometasone 220 MCG/INH Inhaler                Primary Care Provider " Office Phone # Fax #    Lynda Dixon Vladimir Painting [phone] [address]  [facility] [address]        Equal Access to Services     MARIETTA PACHECO : Hadii aad ku hadlilianao Soomaali, waaxda luqadaha, qaybta kaalmada adeegyada, waxedinson idiin briann yony samson gui cordero. So [facility] [phone].    ATENCIÓN: Si habla español, tiene a new disposición servicios gratuitos de asistencia lingüística. Llame al [phone].    We comply with applicable federal civil rights laws and [place] laws. We do not discriminate on the basis of race, color, national origin, age, disability, sex, sexual orientation, or gender identity.            Thank you!     Thank you for choosing [facility]  for your care. Our goal is always to provide you with excellent care. Hearing back from our patients is one way we can continue to improve our services. Please take a few minutes to complete the written survey that you may receive in the mail after your visit with us. Thank you!             Your Updated Medication List - Protect others around you: Learn how to safely use, store and throw away your medicines at www.disposemymeds.org.          This list is accurate as of 9/12/18  8:47 AM.  Always use your most recent med list.                   Brand Name Dispense Instructions for use Diagnosis    desonide 0.05 % cream    DESOWEN    60 g    Apply sparingly to Legs, up to twice daily as needed.    Intrinsic atopic dermatitis       fluticasone 50 MCG/ACT spray    FLONASE          ipratropium - albuterol 0.5 mg/2.5 mg/3 mL 0.5-2.5 (3) MG/3ML neb solution    DUONEB    30 vial    Take 1 vial (3 mLs) by nebulization every 4 hours as needed for shortness of breath / dyspnea or wheezing    Acute bronchitis       levocetirizine 5 MG tablet    XYZAL    90 tablet    Take 1 tablet (5 mg) by mouth every evening    Mild persistent asthma without complication       levonorgestrel-ethinyl estradiol per tablet     TRIVORA (28)    112 tablet    One tablet continuously x 3 months, then off one week, repeat cycle x one year    Encounter for surveillance of contraceptive pills       losartan 100 MG tablet    COZAAR    90 tablet    Take 1 tablet (100 mg) by mouth daily    Hypertension goal BP (blood pressure) < 140/90       mometasone 220 MCG/INH Inhaler    ASMANEX 120 METERED DOSES    1 Inhaler    Inhale 2 puffs into the lungs daily    Mild persistent asthma without complication       montelukast 10 MG tablet    SINGULAIR          omeprazole 40 MG capsule    priLOSEC    90 capsule    Take 1 capsule (40 mg) by mouth daily    Gastroesophageal reflux disease without esophagitis       PATADAY 0.2 % Soln   Generic drug:  olopatadine HCl      Apply 1 drop to eye as needed. Each eye    HTN (hypertension)       SEREVENT DISKUS 50 MCG/DOSE diskus inhaler   Generic drug:  salmeterol           valACYclovir 500 MG tablet    VALTREX    135 tablet    TAKE 1 TABLET BY MOUTH EVERY DAY AND INCREASE TO 2 TABLETS DAILY FOR 10 DAYS WITH OUTBREAKS    Recurrent cold sores       VENTOLIN  (90 Base) MCG/ACT inhaler   Generic drug:  albuterol      as needed

## 2018-09-12 NOTE — PROGRESS NOTES
SUBJECTIVE:   Kylie Arellano is a 53 year old female who presents to clinic today for the following health issues:  Joint Pain    Onset: 2 months    Description:   Location: right shoulder to elbow  Character: when using to do things( movements)    Intensity: 4/10    Progression of Symptoms: worse    Accompanying Signs & Symptoms:  Other symptoms:  radiation of pain to elbow    History:   Previous similar pain: YES- just elbow pain but not shoulder      Precipitating factors:   Trauma or overuse: no     Alleviating factors:  Improved by: rest/inactivity    Therapies Tried and outcome:  Rest and Inactivity, ibuprofen  Outcome : not helping    Posterior right shoulder pain for 2 months.  Gradually worsening and having to change arm motions due to pain.        Hypertension Follow-up      Outpatient blood pressures are not being checked.    Low Salt Diet: no added salt    Not currently on medication as ran out and did not return as instructed.    Asthma - has not been taking asmanex and allergies are very bad this year.      Problem list and histories reviewed & adjusted, as indicated.  Additional history: as documented    Patient Active Problem List   Diagnosis     Moderate persistent asthma without complication     Seasonal allergic rhinitis     GERD (gastroesophageal reflux disease)     Acne     CARDIOVASCULAR SCREENING; LDL GOAL LESS THAN 160     Hypertension goal BP (blood pressure) < 140/90     S/P LEEP (status post loop electrosurgical excision procedure)- АЛЕКСАНДР 2/3     Atopic rhinitis     Recurrent cold sores     Past Surgical History:   Procedure Laterality Date     BUNIONECTOMY RT/LT  1985    bilateral surgeries, two separate surgeries     CONIZATION LEEP  2010     SINUS SURGERY      multiple surgeries     SURGICAL HISTORY OF -   2003    Breast augmentation     TONSILLECTOMY  1982       Social History   Substance Use Topics     Smoking status: Never Smoker     Smokeless tobacco: Never Used      Comment: no  "second hand smoke     Alcohol use 0.0 oz/week     0 Standard drinks or equivalent per week      Comment: occassional     Family History   Problem Relation Age of Onset     Connective Tissue Disorder Mother      autoimmune disorder that affects muscles     Cancer Maternal Grandfather      Diabetes Paternal Grandfather      Hypertension No family hx of      Cerebrovascular Disease No family hx of      Thyroid Disease No family hx of      Glaucoma No family hx of      Macular Degeneration No family hx of            Reviewed and updated as needed this visit by clinical staff  Tobacco  Allergies  Meds  Problems       Reviewed and updated as needed this visit by Provider  Allergies  Meds  Problems         ROS:  Constitutional, HEENT, cardiovascular, pulmonary, GI, , musculoskeletal, neuro, skin, endocrine and psych systems are negative, except as otherwise noted.    OBJECTIVE:     BP (!) 164/102 (BP Location: Right arm, Patient Position: Chair, Cuff Size: Adult Regular)  Pulse 107  Temp 97.8  F (36.6  C) (Oral)  Resp 16  Ht 5' 3.78\" (1.62 m)  Wt 148 lb 9.6 oz (67.4 kg)  SpO2 98%  Breastfeeding? No  BMI 25.68 kg/m2  Body mass index is 25.68 kg/(m^2).  GENERAL: healthy, alert and no distress  NECK: no adenopathy, no asymmetry, masses, or scars and thyroid normal to palpation  RESP: lungs clear to auscultation - no rales, rhonchi or wheezes  CV: regular rate and rhythm, normal S1 S2, no S3 or S4, no murmur, click or rub, no peripheral edema and peripheral pulses strong  ABDOMEN: soft, nontender, no hepatosplenomegaly, no masses and bowel sounds normal  MS: right posterior and superior shoulder pain with passive ROM  SKIN: no suspicious lesions or rashes  PSYCH: mentation appears normal, affect normal/bright    Diagnostic Test Results:  none     ASSESSMENT/PLAN:     1. Acute pain of right shoulder  Xray to assess AC joint and MRI to assess soft tissues.  - XR Shoulder Right 2 Views; Future  - MR Shoulder " Right w/o Contrast; Future    2. Mild persistent asthma without complication  Not controlled - restart asmanex and allery medication  - mometasone (ASMANEX 120 METERED DOSES) 220 MCG/INH Inhaler; Inhale 2 puffs into the lungs daily  Dispense: 1 Inhaler; Refill: 11  - levocetirizine (XYZAL) 5 MG tablet; Take 1 tablet (5 mg) by mouth every evening  Dispense: 90 tablet; Refill: 3    3. Hypertension goal BP (blood pressure) < 140/90  Not controlled - check labs and refill medication  - COMPREHENSIVE METABOLIC PANEL  - losartan (COZAAR) 100 MG tablet; Take 1 tablet (100 mg) by mouth daily  Dispense: 90 tablet; Refill: 0    4. Screen for colon cancer  Recommended    5. Screening for malignant neoplasm of cervix  Recommended and patient to schedule.    The uses and side effects, including black box warnings as appropriate, were discussed in detail.  All patient questions were answered.  The patient was instructed to call immediately if any side effects developed.       FUTURE APPOINTMENTS:       - Follow-up visit in 4 weeks for ancillary BP check.    Lynda Painting MD  [facility]

## 2018-09-12 NOTE — PATIENT INSTRUCTIONS
Call [phone] to schedule your MRI and schedule your pap smear ASAP.  At [facility], we strive to deliver an exceptional experience to you, every time we see you.  If you receive a survey in the mail, please send us back your thoughts. We really do value your feedback.    Based on your medical history, these are the current health maintenance/preventive care services that you are due for (some may have been done at this visit.)  Health Maintenance Due   Topic Date Due     EYE EXAM Q1 YEAR  11/14/2015     COLON CANCER SCREEN (SYSTEM ASSIGNED)  02/01/2017     ASTHMA ACTION PLAN Q1 YR  03/07/2017     CMP Q1 YR  04/12/2018     BMP Q6 MOS  04/16/2018     ASTHMA CONTROL TEST Q6 MOS  04/16/2018     HPV Q1 Year  04/19/2018     PAP Q1 YR DIAGNOSTIC  04/19/2018     PHQ-2 Q1 YR  04/19/2018     INFLUENZA VACCINE (1) 09/01/2018       Suggested websites for health information:  Www.[facility].yuback : Up to date and easily searchable information on multiple topics.  Www.medlineplus.gov : medication info, interactive tutorials, watch real surgeries online  Www.familydoctor.org : good info from the Academy of Family Physicians  Www.cdc.gov : public health info, travel advisories, epidemics (H1N1)  Www.aap.org : children's health info, normal development, vaccinations  Www.health.state.[place].us : [place] dept of health, public health issues in [place], N1N1    Your care team:                            Family Medicine Internal Medicine   MD Shimon Blevins MD Shantel Branch-Fleming, MD Katya Georgiev PA-C Megan Hill, APRN CNP Nam Ho, MD Pediatrics   JEANNE Tay CNP Paula Brito, MD Amelia Massimini APRN CNP   MD Aminata Locke MD Deborah Mielke, MD Kim Thein, APRN [facility]      Clinic hours: Monday - Thursday 7 am-7 pm; Fridays 7 am-5 pm.   Urgent care: Monday - Friday 11 am-9 pm; Saturday and Sunday 9 am-5 pm.  Pharmacy : Monday -Thursday 8 am-8 pm; Friday 8  am-6 pm; Saturday and Sunday 9 am-5 pm.     Clinic: [phone]   Pharmacy: [phone]

## 2018-09-13 ENCOUNTER — TELEPHONE (OUTPATIENT)
Dept: FAMILY MEDICINE | Facility: CLINIC | Age: 53
End: 2018-09-13

## 2018-09-13 NOTE — TELEPHONE ENCOUNTER
Plan does not cover levocetirizine (XYZAL) 5 MG tablet.  Please call [phone] to initiate Prior Auth or change med.    Insurance type and ID number: ID# [id number]      Additional Information:     Elenita Wray

## 2018-09-14 NOTE — PROGRESS NOTES
Ms. Eileen,    Your xray did show mild arthritis at the tip of the shoulder as we discussed.  Continue with the plan.    Please contact the clinic if you have additional questions.  Thank you.    Sincerely,    Lynda Painting

## 2018-09-14 NOTE — PROGRESS NOTES
MsJessica Arellano,    One of your liver enzymes is very slightly high.  We may check that again at your physical.  Your protein level is a little low.  Try improve your intake.    Please contact the clinic if you have additional questions.  Thank you.    Sincerely,    Lynda Painting

## 2018-09-14 NOTE — TELEPHONE ENCOUNTER
This PA request was submitted to the insurance by the Central Prior Authorization Team.  If you have any questions in regards to this request, we can be reached at [phone].     Thanks    PA Initiation    Medication: levocetirizine (XYZAL) 5 MG tablet  Insurance Company: [employer] [place] - Phone [phone] Fax [phone]  Pharmacy Filling the Rx: [id number] - [place], [place] - [address]  Filling Pharmacy Phone: [phone]  Filling Pharmacy Fax:    Start Date: 9/14/2018

## 2018-09-17 NOTE — TELEPHONE ENCOUNTER
Prior Authorization Approval    Authorization Effective Date: 9/16/2018  Authorization Expiration Date: 9/17/2019  Medication: levocetirizine (XYZAL) 5 MG tablet - APPROVED   Approved Dose/Quantity:   Reference #:     Insurance Company: [employer] [place] - Phone [phone] Fax [phone]  Expected CoPay:       CoPay Card Available:      Foundation Assistance Needed:    Which Pharmacy is filling the prescription (Not needed for infusion/clinic administered): [id number] - [place], [place] - [address]  Pharmacy Notified: Yes  Patient Notified: Yes

## 2018-09-17 NOTE — TELEPHONE ENCOUNTER
FUTURE VISIT INFORMATION      FUTURE VISIT INFORMATION:    Date: 9/18    Time: 6:30    Location:   REFERRAL INFORMATION:    Referring provider:  SELF    Referring providers clinic:      Reason for visit/diagnosis: Shoulder pain       RECORDS STATUS      ALL RECORDS AND IMAGING INTERNAL XRAY DONE 9/12/18

## 2018-09-18 ENCOUNTER — PRE VISIT (OUTPATIENT)
Dept: ORTHOPEDICS | Facility: CLINIC | Age: 53
End: 2018-09-18

## 2018-09-18 ENCOUNTER — OFFICE VISIT (OUTPATIENT)
Dept: ORTHOPEDICS | Facility: CLINIC | Age: 53
End: 2018-09-18
Payer: COMMERCIAL

## 2018-09-18 VITALS
BODY MASS INDEX: 25.27 KG/M2 | HEIGHT: 64 IN | SYSTOLIC BLOOD PRESSURE: 165 MMHG | WEIGHT: 148 LBS | DIASTOLIC BLOOD PRESSURE: 117 MMHG | HEART RATE: 81 BPM

## 2018-09-18 DIAGNOSIS — M75.41 SHOULDER IMPINGEMENT SYNDROME, RIGHT: Primary | ICD-10-CM

## 2018-09-18 RX ORDER — NAPROXEN 500 MG/1
500 TABLET ORAL 2 TIMES DAILY WITH MEALS
Qty: 60 TABLET | Refills: 1 | Status: SHIPPED | OUTPATIENT
Start: 2018-09-18 | End: 2019-06-25

## 2018-09-18 NOTE — PROGRESS NOTES
HPI:   Right Shoulder pain:   Location: Superior and posterior  Duration: 2-3 months   Injury? Inciting activity? Slept on right shoulder in full abducted position. Aggravated by internal rotation, lifting objects, and abrupt movements.   Radiation: Posterior arm   Numbness/tingling? Denies  Weakness? Denies  Instability? No  Clicking/ catching? None  Imaging? Xray, MRI  Treatment? Ice    No prior hx of shoulder injuries. She has hx of neck injury and chronic issues.

## 2018-09-18 NOTE — LETTER
9/18/2018      RE: Kyile Arellano  [address]  [place] [address]       HPI:   Right Shoulder pain:   Location: Superior and posterior  Duration: 2-3 months   Injury? Inciting activity? Slept on right shoulder in full abducted position. Aggravated by internal rotation, lifting objects, and abrupt movements.   Radiation: Posterior arm   Numbness/tingling? Denies  Weakness? Denies  Instability? No  Clicking/ catching? None  Imaging? Xray, MRI  Treatment? Ice    No prior hx of shoulder injuries. She has hx of neck injury and chronic issues.       Pt is a 53 year old female referred by Dr Vladimir Painting here today for:     HPI:   Right Shoulder pain:   Location: Superior and posterior  Duration: 2-3 months   Injury? Inciting activity? Slept on right shoulder in full abducted position. Aggravated by internal rotation, lifting objects, and abrupt movements.   Radiation: Posterior arm   Numbness/tingling? Denies  Weakness? Denies  Instability? No  Clicking/ catching? None  Imaging? Xray, MRI  Treatment? Ice     No prior hx of shoulder injuries. She has hx of neck injury and chronic issues.     Past Medical History:   Diagnosis Date     Cervical dysplasia, moderate 10/10    АЛЕКСАНДР 2-3      Cervical high risk HPV (human papillomavirus) test positive 04/19/2017    Neg 16/18     Dermatitis      GERD (gastroesophageal reflux disease)      HSV-1 (herpes simplex virus 1) infection      HTN 2005     Hypertension goal BP (blood pressure) < 140/90 2/10/2011     Mild depression (H)      Mild persistent asthma 2007     S/P LEEP of cervix 10/10     Seasonal allergic rhinitis       Past Surgical History:   Procedure Laterality Date     BUNIONECTOMY RT/LT  1985    bilateral surgeries, two separate surgeries     CONIZATION LEEP  2010     SINUS SURGERY      multiple surgeries     SURGICAL HISTORY OF -   2003    Breast augmentation     TONSILLECTOMY  1982      Current Outpatient Prescriptions   Medication Sig Dispense Refill      fluticasone (FLONASE) 50 MCG/ACT nasal spray   1     levocetirizine (XYZAL) 5 MG tablet Take 1 tablet (5 mg) by mouth every evening 90 tablet 3     levonorgestrel-ethinyl estradiol (TRIVORA, 28,) per tablet One tablet continuously x 3 months, then off one week, repeat cycle x one year 112 tablet 4     losartan (COZAAR) 100 MG tablet Take 1 tablet (100 mg) by mouth daily 90 tablet 0     mometasone (ASMANEX 120 METERED DOSES) 220 MCG/INH Inhaler Inhale 2 puffs into the lungs daily 1 Inhaler 11     montelukast (SINGULAIR) 10 MG tablet   2     Olopatadine HCl (PATADAY) 0.2 % SOLN Apply 1 drop to eye as needed. Each eye       omeprazole (PRILOSEC) 40 MG capsule Take 1 capsule (40 mg) by mouth daily 90 capsule 0     SEREVENT DISKUS 50 MCG/DOSE diskus inhaler   2     valACYclovir (VALTREX) 500 MG tablet TAKE 1 TABLET BY MOUTH EVERY DAY AND INCREASE TO 2 TABLETS DAILY FOR 10 DAYS WITH OUTBREAKS 135 tablet 0     VENTOLIN  (90 BASE) MCG/ACT IN AERS as needed       desonide (DESOWEN) 0.05 % cream Apply sparingly to Legs, up to twice daily as needed. (Patient not taking: Reported on 9/12/2018) 60 g 2     ipratropium - albuterol 0.5 mg/2.5 mg/3 mL (DUONEB) 0.5-2.5 (3) MG/3ML nebulization Take 1 vial (3 mLs) by nebulization every 4 hours as needed for shortness of breath / dyspnea or wheezing (Patient not taking: Reported on 9/12/2018) 30 vial 1      No Known Allergies   Social History   Substance Use Topics     Smoking status: Never Smoker     Smokeless tobacco: Never Used      Comment: no second hand smoke     Alcohol use 0.0 oz/week     0 Standard drinks or equivalent per week      Comment: occassional      Family History   Problem Relation Age of Onset     Connective Tissue Disorder Mother      autoimmune disorder that affects muscles     Cancer Maternal Grandfather      Diabetes Paternal Grandfather      Hypertension No family hx of      Cerebrovascular Disease No family hx of      Thyroid Disease No family hx of   "    Glaucoma No family hx of      Macular Degeneration No family hx of       ROS:   Gen- no fevers/chills   Rheum - no morning stiffness   Derm - no rash/ redness   Neuro - no numbness, no tingling   Remainder of ROS negative.     Exam:   BP (!) 176/112 (BP Location: Right arm, Patient Position: Sitting, Cuff Size: Adult Regular)  Pulse 77  Ht 5' 3.78\" (1.62 m)  Wt 148 lb (67.1 kg)  BMI 25.58 kg/m2       R Shoulder:   Inspection: asymmetry? No; dyskinesis? NO   ROM:   Active - forward flexion -lacks 10 degrees/ abduction - full/ int rot - symmetric/ ext rot - symmetric   Passive- forward flexion - full/ abduction - full   Strength: deltoid/supraspinatous - 5/5; infraspinatous/ teres minor - 5/5; subscapularis - 5/5   Maneuvers:   RTC - empty can - +; painful arc - +; lift off - neg   Impingement - Silva -+; Neer- +  AC - cross arm - +   Biceps - Speed's - neg; Yergason's - neg   Palpation: AC - neg; Acromion/ supraspinatus - neg; scapula - neg; bicipital groove - neg     Xray - 9/12/18    IMPRESSION: No evidence for acute fracture in the right shoulder.  Minimal degenerative changes are noted involving the right  acromioclavicular joint.        (M75.41) Shoulder impingement syndrome, right  (primary encounter diagnosis)  Comment: her exam actually has good strength; I discussed option of pursuing the MRI ordered by her PCP vs trial of conservative tx; She will rehab and try nsaids prn; f/u in 6 wks; if no better or worse w/ PT, she will get the MRI and follow-up after her scan; would defer an injection at this time.    Plan: PHYSICAL THERAPY REFERRAL (Internal), naproxen         (NAPROSYN) 500 MG tablet            Jr Garcia MD  September 18, 2018  6:22 PM        "

## 2018-09-18 NOTE — MR AVS SNAPSHOT
After Visit Summary   9/18/2018    Kylie Arellano    MRN: [id number]           Patient Information     Date Of Birth          1965        Visit Information        Provider Department      9/18/2018 6:30 PM Jr Garcia MD [facility]        Today's Diagnoses     Shoulder impingement syndrome, right    -  1       Follow-ups after your visit        Additional Services     PHYSICAL THERAPY REFERRAL (Internal)       Physical Therapy Referral                  Follow-up notes from your care team     Return in about 6 weeks (around 10/30/2018).      Your next 10 appointments already scheduled     Sep 18, 2018  6:30 PM CDT   (Arrive by 6:15 PM)   New Patient Visit with Jr Garcia MD   [facility] ([facility])    [address]  [address] [facility] [address] [phone]            Sep 20, 2018  2:40 PM CDT   [facility] Visit with Lynda [facility]ting MD   [facility]    [address] [address] [phone]           Bring a current list of meds and any records pertaining to this visit. For Physicals, please bring immunization records and any forms needing to be filled out. Please arrive 10 minutes early to complete paperwork.            Nov 13, 2018  4:15 PM CST   [facility]   [facility]    [id number] [place] [address] [phone]              Who to contact     Please call your clinic at [phone] to:    Ask questions about your health    Make or cancel appointments    Discuss your medicines    Learn about your test results    Speak to your doctor            Additional Information About Your Visit        MyChart Information     [employer] gives you secure access to your electronic health record. If you see a primary care provider, you can also send  "messages to your care team and make appointments. If you have questions, please call your primary care clinic.  If you do not have a primary care provider, please call [phone] and they will assist you.      [employer] is an electronic gateway that provides easy, online access to your medical records. With [employer], you can request a clinic appointment, read your test results, renew a prescription or communicate with your care team.     To access your existing account, please contact your [facility] or call [phone] for assistance.        Care EveryWhere ID     This is your Care EveryWhere ID. This could be used by other organizations to access your [facility] medical records  [id number]        Your Vitals Were     Pulse Height BMI (Body Mass Index)             81 5' 3.78\" (1.62 m) 25.58 kg/m2          Blood Pressure from Last 3 Encounters:   09/18/18 (!) 165/117   09/12/18 (!) 164/102   12/05/17 138/90    Weight from Last 3 Encounters:   09/18/18 148 lb (67.1 kg)   09/12/18 148 lb 9.6 oz (67.4 kg)   12/06/17 154 lb 6.4 oz (70 kg)              We Performed the Following     PHYSICAL THERAPY REFERRAL (Internal)          Today's Medication Changes          These changes are accurate as of 9/18/18  6:23 PM.  If you have any questions, ask your nurse or doctor.               Start taking these medicines.        Dose/Directions    naproxen 500 MG tablet   Commonly known as:  NAPROSYN   Used for:  Shoulder impingement syndrome, right   Started by:  Jr Garcia MD        Dose:  500 mg   Take 1 tablet (500 mg) by mouth 2 times daily (with meals)   Quantity:  60 tablet   Refills:  1            Where to get your medicines      These medications were sent to [facility][id number] - [place], [place] - [address]  [address] [address]     Phone:  [phone]     naproxen 500 MG tablet                Primary Care Provider Office Phone # Fax #    Lynda Dixon " Vladimir Painting -[phone]       [address]  [facility] [address]        Equal Access to Services     MARIETTA PACHECO : Hadii laura iqbal srinath Sojosie, walaceyda luqadaha, qaybta kaalmada ademulugeta, celia samson laJosyannalisa cordero. So [facility] [phone].    ATENCIÓN: Si habla español, tiene a new disposición servicios gratuitos de asistencia lingüística. Llame al [phone].    We comply with applicable federal civil rights laws and [place] laws. We do not discriminate on the basis of race, color, national origin, age, disability, sex, sexual orientation, or gender identity.            Thank you!     Thank you for choosing [facility]  for your care. Our goal is always to provide you with excellent care. Hearing back from our patients is one way we can continue to improve our services. Please take a few minutes to complete the written survey that you may receive in the mail after your visit with us. Thank you!             Your Updated Medication List - Protect others around you: Learn how to safely use, store and throw away your medicines at www.disposemymeds.org.          This list is accurate as of 9/18/18  6:23 PM.  Always use your most recent med list.                   Brand Name Dispense Instructions for use Diagnosis    desonide 0.05 % cream    DESOWEN    60 g    Apply sparingly to Legs, up to twice daily as needed.    Intrinsic atopic dermatitis       fluticasone 50 MCG/ACT spray    FLONASE          ipratropium - albuterol 0.5 mg/2.5 mg/3 mL 0.5-2.5 (3) MG/3ML neb solution    DUONEB    30 vial    Take 1 vial (3 mLs) by nebulization every 4 hours as needed for shortness of breath / dyspnea or wheezing    Acute bronchitis       levocetirizine 5 MG tablet    XYZAL    90 tablet    Take 1 tablet (5 mg) by mouth every evening    Mild persistent asthma without complication       levonorgestrel-ethinyl estradiol per tablet    TRIVORA (28)    112 tablet    One tablet  continuously x 3 months, then off one week, repeat cycle x one year    Encounter for surveillance of contraceptive pills       losartan 100 MG tablet    COZAAR    90 tablet    Take 1 tablet (100 mg) by mouth daily    Hypertension goal BP (blood pressure) < 140/90       mometasone 220 MCG/INH Inhaler    ASMANEX 120 METERED DOSES    1 Inhaler    Inhale 2 puffs into the lungs daily    Mild persistent asthma without complication       montelukast 10 MG tablet    SINGULAIR          naproxen 500 MG tablet    NAPROSYN    60 tablet    Take 1 tablet (500 mg) by mouth 2 times daily (with meals)    Shoulder impingement syndrome, right       omeprazole 40 MG capsule    priLOSEC    90 capsule    Take 1 capsule (40 mg) by mouth daily    Gastroesophageal reflux disease without esophagitis       PATADAY 0.2 % Soln   Generic drug:  olopatadine HCl      Apply 1 drop to eye as needed. Each eye    HTN (hypertension)       SEREVENT DISKUS 50 MCG/DOSE diskus inhaler   Generic drug:  salmeterol           valACYclovir 500 MG tablet    VALTREX    135 tablet    TAKE 1 TABLET BY MOUTH EVERY DAY AND INCREASE TO 2 TABLETS DAILY FOR 10 DAYS WITH OUTBREAKS    Recurrent cold sores       VENTOLIN  (90 Base) MCG/ACT inhaler   Generic drug:  albuterol      as needed

## 2018-09-18 NOTE — PROGRESS NOTES
Pt is a 53 year old female referred by Dr Vladimir Painting here today for:     HPI:   Right Shoulder pain:   Location: Superior and posterior  Duration: 2-3 months   Injury? Inciting activity? Slept on right shoulder in full abducted position. Aggravated by internal rotation, lifting objects, and abrupt movements.   Radiation: Posterior arm   Numbness/tingling? Denies  Weakness? Denies  Instability? No  Clicking/ catching? None  Imaging? Xray, MRI  Treatment? Ice     No prior hx of shoulder injuries. She has hx of neck injury and chronic issues.     Past Medical History:   Diagnosis Date     Cervical dysplasia, moderate 10/10    АЛЕКСАНДР 2-3      Cervical high risk HPV (human papillomavirus) test positive 04/19/2017    Neg 16/18     Dermatitis      GERD (gastroesophageal reflux disease)      HSV-1 (herpes simplex virus 1) infection      HTN 2005     Hypertension goal BP (blood pressure) < 140/90 2/10/2011     Mild depression (H)      Mild persistent asthma 2007     S/P LEEP of cervix 10/10     Seasonal allergic rhinitis       Past Surgical History:   Procedure Laterality Date     BUNIONECTOMY RT/LT  1985    bilateral surgeries, two separate surgeries     CONIZATION LEEP  2010     SINUS SURGERY      multiple surgeries     SURGICAL HISTORY OF -   2003    Breast augmentation     TONSILLECTOMY  1982      Current Outpatient Prescriptions   Medication Sig Dispense Refill     fluticasone (FLONASE) 50 MCG/ACT nasal spray   1     levocetirizine (XYZAL) 5 MG tablet Take 1 tablet (5 mg) by mouth every evening 90 tablet 3     levonorgestrel-ethinyl estradiol (TRIVORA, 28,) per tablet One tablet continuously x 3 months, then off one week, repeat cycle x one year 112 tablet 4     losartan (COZAAR) 100 MG tablet Take 1 tablet (100 mg) by mouth daily 90 tablet 0     mometasone (ASMANEX 120 METERED DOSES) 220 MCG/INH Inhaler Inhale 2 puffs into the lungs daily 1 Inhaler 11     montelukast (SINGULAIR) 10 MG tablet   2     Olopatadine HCl  "(PATADAY) 0.2 % SOLN Apply 1 drop to eye as needed. Each eye       omeprazole (PRILOSEC) 40 MG capsule Take 1 capsule (40 mg) by mouth daily 90 capsule 0     SEREVENT DISKUS 50 MCG/DOSE diskus inhaler   2     valACYclovir (VALTREX) 500 MG tablet TAKE 1 TABLET BY MOUTH EVERY DAY AND INCREASE TO 2 TABLETS DAILY FOR 10 DAYS WITH OUTBREAKS 135 tablet 0     VENTOLIN  (90 BASE) MCG/ACT IN AERS as needed       desonide (DESOWEN) 0.05 % cream Apply sparingly to Legs, up to twice daily as needed. (Patient not taking: Reported on 9/12/2018) 60 g 2     ipratropium - albuterol 0.5 mg/2.5 mg/3 mL (DUONEB) 0.5-2.5 (3) MG/3ML nebulization Take 1 vial (3 mLs) by nebulization every 4 hours as needed for shortness of breath / dyspnea or wheezing (Patient not taking: Reported on 9/12/2018) 30 vial 1      No Known Allergies   Social History   Substance Use Topics     Smoking status: Never Smoker     Smokeless tobacco: Never Used      Comment: no second hand smoke     Alcohol use 0.0 oz/week     0 Standard drinks or equivalent per week      Comment: occassional      Family History   Problem Relation Age of Onset     Connective Tissue Disorder Mother      autoimmune disorder that affects muscles     Cancer Maternal Grandfather      Diabetes Paternal Grandfather      Hypertension No family hx of      Cerebrovascular Disease No family hx of      Thyroid Disease No family hx of      Glaucoma No family hx of      Macular Degeneration No family hx of       ROS:   Gen- no fevers/chills   Rheum - no morning stiffness   Derm - no rash/ redness   Neuro - no numbness, no tingling   Remainder of ROS negative.     Exam:   BP (!) 176/112 (BP Location: Right arm, Patient Position: Sitting, Cuff Size: Adult Regular)  Pulse 77  Ht 5' 3.78\" (1.62 m)  Wt 148 lb (67.1 kg)  BMI 25.58 kg/m2       R Shoulder:   Inspection: asymmetry? No; dyskinesis? NO   ROM:   Active - forward flexion -lacks 10 degrees/ abduction - full/ int rot - symmetric/ ext " rot - symmetric   Passive- forward flexion - full/ abduction - full   Strength: deltoid/supraspinatous - 5/5; infraspinatous/ teres minor - 5/5; subscapularis - 5/5   Maneuvers:   RTC - empty can - +; painful arc - +; lift off - neg   Impingement - Silva -+; Neer- +  AC - cross arm - +   Biceps - Speed's - neg; Yergason's - neg   Palpation: AC - neg; Acromion/ supraspinatus - neg; scapula - neg; bicipital groove - neg     Xray - 9/12/18    IMPRESSION: No evidence for acute fracture in the right shoulder.  Minimal degenerative changes are noted involving the right  acromioclavicular joint.        (M75.41) Shoulder impingement syndrome, right  (primary encounter diagnosis)  Comment: her exam actually has good strength; I discussed option of pursuing the MRI ordered by her PCP vs trial of conservative tx; She will rehab and try nsaids prn; f/u in 6 wks; if no better or worse w/ PT, she will get the MRI and follow-up after her scan; would defer an injection at this time.    Plan: PHYSICAL THERAPY REFERRAL (Internal), naproxen         (NAPROSYN) 500 MG tablet            Jr Garcia MD  September 18, 2018  6:22 PM

## 2018-09-27 ENCOUNTER — THERAPY VISIT (OUTPATIENT)
Dept: PHYSICAL THERAPY | Facility: CLINIC | Age: 53
End: 2018-09-27
Attending: FAMILY MEDICINE
Payer: COMMERCIAL

## 2018-09-27 DIAGNOSIS — M25.511 SHOULDER PAIN, RIGHT: Primary | ICD-10-CM

## 2018-09-27 PROCEDURE — 97162 PT EVAL MOD COMPLEX 30 MIN: CPT | Mod: GP | Performed by: PHYSICAL THERAPIST

## 2018-09-27 PROCEDURE — 97112 NEUROMUSCULAR REEDUCATION: CPT | Mod: GP | Performed by: PHYSICAL THERAPIST

## 2018-09-27 PROCEDURE — 97110 THERAPEUTIC EXERCISES: CPT | Mod: GP | Performed by: PHYSICAL THERAPIST

## 2018-09-27 NOTE — MR AVS SNAPSHOT
After Visit Summary   9/27/2018    Kylie Arellano    MRN: [id number]           Patient Information     Date Of Birth          1965        Visit Information        Provider Department      9/27/2018 9:00 AM Danna House PT [facility]        Today's Diagnoses     Shoulder pain, right    -  1       Follow-ups after your visit        Your next 10 appointments already scheduled     Oct 09, 2018  4:00 PM CDT   DAISY Extremity with Danna House PT   [facility] ([facility]  )    [address] [facility] [address]   [phone]            Oct 16, 2018  7:30 AM CDT   DAISY Extremity with Danna House PT   [facility] ([facility] [place]  )    [address] [facility] [address]   [phone]            Oct 30, 2018 10:45 AM CDT   (Arrive by 10:30 AM)   Return Visit with Jr Garcia MD   [facility] ([facility])    [address] [place] [address] [phone]            Nov 13, 2018  4:15 PM CST   Return Visit with Kinga Ocampo MD   [facility]    [address] [place] [address] [phone]              Who to contact     If you have questions or need follow up information about today's clinic visit or your schedule please contact [facility] directly at [phone].  Normal or non-critical lab and imaging results will be communicated to you by MyChart, letter or phone within 4 business days after the clinic has received the results. If you do not hear from us within 7 days, please contact the clinic through MyChart or phone. If you have a critical or abnormal lab result, we will notify you by phone as soon as possible.  Submit refill requests through Tractive or call your pharmacy and they will forward  the refill request to us. Please allow 3 business days for your refill to be completed.          Additional Information About Your Visit        M[employer]ee[employer] Information     evocatal gives you secure access to your electronic health record. If you see a primary care provider, you can also send messages to your care team and make appointments. If you have questions, please call your primary care clinic.  If you do not have a primary care provider, please call [phone] and they will assist you.        Care EveryWhere ID     This is your Care EveryWhere ID. This could be used by other organizations to access your [facility] medical records  [id number]         Blood Pressure from Last 3 Encounters:   09/18/18 (!) 165/117   09/12/18 (!) 164/102   12/05/17 138/90    Weight from Last 3 Encounters:   09/18/18 67.1 kg (148 lb)   09/12/18 67.4 kg (148 lb 9.6 oz)   12/06/17 70 kg (154 lb 6.4 oz)              We Performed the Following     DAISY Inital Eval Report     Neuromuscular Re-Education     PT Milvia, Moderate Complexity (50[address])     Therapeutic Exercises        Primary Care Provider Office Phone # Fax #    [employer] [employer] [phone]       [address]  [facility] [address]        Equal Access to Services     MARIETTA PACHECO AH: Hadii aad ku hadasho Soomaali, waaxda luqadaha, qaybta kaalmada adeegyada, waxay idiin hayaan yony cordero. So [facility] [phone].    ATENCIÓN: Si habla español, tiene a new disposición servicios gratuitos de asistencia lingüística. Llame al [phone].    We comply with applicable federal civil rights laws and [place] laws. We do not discriminate on the basis of race, color, national origin, age, disability, sex, sexual orientation, or gender identity.            Thank you!     Thank you for choosing [facility]  for your care. Our goal is always to provide you with excellent care. Hearing back from our patients is one  way we can continue to improve our services. Please take a few minutes to complete the written survey that you may receive in the mail after your visit with us. Thank you!             Your Updated Medication List - Protect others around you: Learn how to safely use, store and throw away your medicines at www.disposemymeds.org.          This list is accurate as of 9/27/18 10:45 AM.  Always use your most recent med list.                   Brand Name Dispense Instructions for use Diagnosis    desonide 0.05 % cream    DESOWEN    60 g    Apply sparingly to Legs, up to twice daily as needed.    Intrinsic atopic dermatitis       fluticasone 50 MCG/ACT spray    FLONASE          ipratropium - albuterol 0.5 mg/2.5 mg/3 mL 0.5-2.5 (3) MG/3ML neb solution    DUONEB    30 vial    Take 1 vial (3 mLs) by nebulization every 4 hours as needed for shortness of breath / dyspnea or wheezing    Acute bronchitis       levocetirizine 5 MG tablet    XYZAL    90 tablet    Take 1 tablet (5 mg) by mouth every evening    Mild persistent asthma without complication       levonorgestrel-ethinyl estradiol per tablet    TRIVORA (28)    112 tablet    One tablet continuously x 3 months, then off one week, repeat cycle x one year    Encounter for surveillance of contraceptive pills       losartan 100 MG tablet    COZAAR    90 tablet    Take 1 tablet (100 mg) by mouth daily    Hypertension goal BP (blood pressure) < 140/90       mometasone 220 MCG/INH Inhaler    ASMANEX 120 METERED DOSES    1 Inhaler    Inhale 2 puffs into the lungs daily    Mild persistent asthma without complication       montelukast 10 MG tablet    SINGULAIR          naproxen 500 MG tablet    NAPROSYN    60 tablet    Take 1 tablet (500 mg) by mouth 2 times daily (with meals)    Shoulder impingement syndrome, right       omeprazole 40 MG capsule    priLOSEC    90 capsule    Take 1 capsule (40 mg) by mouth daily    Gastroesophageal reflux disease without esophagitis       PATADAY 0.2  % Soln   Generic drug:  olopatadine HCl      Apply 1 drop to eye as needed. Each eye    HTN (hypertension)       SEREVENT DISKUS 50 MCG/DOSE diskus inhaler   Generic drug:  salmeterol           valACYclovir 500 MG tablet    VALTREX    135 tablet    TAKE 1 TABLET BY MOUTH EVERY DAY AND INCREASE TO 2 TABLETS DAILY FOR 10 DAYS WITH OUTBREAKS    Recurrent cold sores       VENTOLIN  (90 Base) MCG/ACT inhaler   Generic drug:  albuterol      as needed

## 2018-09-27 NOTE — LETTER
[facility]  [address]  [facility] [address]  [phone]    October 3, 2018    Re: Kylie Arellano   :   1965  MRN:  [id number]   REFERRING PHYSICIAN:   Jr Garcia    [facility]    Date of Initial Evaluation: 2018  Visits:  Rxs Used: 1  Reason for Referral:  Shoulder pain, right    EVALUATION SUMMARY    [facility] Initial [facility]  Subjective:  Patient is a 53 year old female presenting with rehab right shoulder hpi. The history is provided by the patient. No  was used.   Kylie Arellano is a 53 year old female with a right shoulder (right handed) condition.  Condition occurred with:  Unknown cause.  Condition occurred: for unknown reasons.  This is a new condition  Patient reports insidious onset of right shoulder pain when it would wake her up at night.This started 2-3 months ago. She started to get pain into the right upper arm the last couple of weeks. She also has chronic neck pain for several years and this pain is aggravating her neck pain. MD order from 18. .    Patient reports pain:  Anterior, upper arm, posterior and lateral (to R elbow).    Pain is described as stabbing, sharp and aching and is intermittent and reported as 3/10 (up to 8-9/10 when lays on the right side).  Associated symptoms:  Loss of motion/stiffness. Pain is the same all the time (pain based more on what she does).  Symptoms are exacerbated by lying on extremity, lifting, using arm behind back, using arm overhead, using arm at shoulder level and certain positions (8-9/10 pain to lay on the R, 4/10 pain to put shirt over her head,  4/10 pain to fasten bra behind her back, 6/10 pain to reach overhead) and relieved by ice and other (Naproxen).  Since onset symptoms are gradually worsening.        General health as reported by patient is good.  Pertinent medical history includes:  Asthma, high  blood pressure and migraines/headaches.  Medical allergies: no.  Other surgeries include:  None reported.  Current medications:  High blood pressure medication.  Current occupation is .  Patient is working in normal job without restrictions.  Primary job tasks include:  Driving and lifting.  Barriers include:  None as reported by the patient.  Red flags:  None as reported by the patient.        Objective:  Standing Alignment:    Cervical/Thoracic:  Forward head and thoracic kyphosis increased  Shoulder/UE:  Rounded shoulders  Cervical/Thoracic Evaluation  Spinal Segmental Conclusions:    Level:  Hypo at T1, T2, T3, T4, T5, T6, T7 and T8  Shoulder Evaluation:  ROM:  AROM:    Flexion:  Left:  WNL    Right:  WNL with slight end range ache  Abduction:  Left: WNL   Right:  WNL with painful arc at  and end range  Extension/Internal Rotation:  Left:  T 7 SP    Right:  T 10 SP with ache      Strength:    Flexion: Left:5/5    Pain: -    Right: 5/5      Pain:  -  Extension:  Left: 5/5     Pain:-    Right: 5/5     Pain:-  Abduction:  Left: 5/5   Pain:-    Right: 4-/5      Pain:++  Adduction:  Left: 5/5     Pain:-    Right: 5/5      Pain:-  Internal Rotation:  Left:5/5      Pain:-    Right: 4+/5      Pain:+  External Rotation:   Left:5/5      Pain:-   Right:4+/5      Pain:-/+      Horizontal Adduction:  Right:/5     Pain:-  Elbow Flexion:  Left:5/5      Pain:-    Right:5/5      Pain:-  Elbow Extension:  Left:5/5      Pain:-    Right:5/5      Pain:-  Stability Testing:  normal  Special Tests:    Right shoulder positive for the following special tests:Impingement  Right shoulder negative for the following special tests:Acrimioclavicular  Palpation:  not assessed  Assessment/Plan:    Patient is a 53 year old female with right side shoulder complaints.    Patient has the following significant findings with corresponding treatment plan.                Diagnosis 1:  Shoulder impingement  Pain -  hot/cold  therapy, manual therapy, self management, education, directional preference exercise and home program  Decreased joint mobility - manual therapy, therapeutic exercise and home program  Decreased strength - therapeutic exercise, therapeutic activities and home program  Impaired muscle performance - neuro re-education and home program  Decreased function - therapeutic activities and home program  Impaired posture - neuro re-education and home program    Therapy Evaluation Codes:   1) History comprised of:   Personal factors that impact the plan of care:      None.    Comorbidity factors that impact the plan of care are:      chronic neck pain.     Medications impacting care: None.  2) Examination of Body Systems comprised of:   Body structures and functions that impact the plan of care:      Shoulder.   Activity limitations that impact the plan of care are:      Bathing, Cooking, Dressing, Lifting, Throwing and reaching.  3) Clinical presentation characteristics are:   Evolving/Changing.  4) Decision-Making    Moderate complexity using standardized patient assessment instrument and/or measureable assessment of functional outcome.  Cumulative Therapy Evaluation is: Moderate complexity.    Previous and current functional limitations:  (See Goal Flow Sheet for this information)    Short term and Long term goals: (See Goal Flow Sheet for this information)     Communication ability:  Patient appears to be able to clearly communicate and understand verbal and written communication and follow directions correctly.  Treatment Explanation - The following has been discussed with the patient:   RX ordered/plan of care  Anticipated outcomes  Possible risks and side effects  This patient would benefit from PT intervention to resume normal activities.   Rehab potential is good.    Frequency:  1-2 X week, once daily  Duration:  for 6-10 weeks  Discharge Plan:  Achieve all LTG.  Independent in home treatment program.  Reach maximal  therapeutic benefit.    Please refer to the daily flowsheet for treatment today, total treatment time and time spent performing 1:1 timed codes.     Thank you for your referral.    INQUIRIES  Therapist: Danna House, [facility]  [facility]  [address]  [place] [address]  Phone: [phone]  Fax: [phone]

## 2018-09-27 NOTE — PROGRESS NOTES
[facility] Initial Evaluation  Subjective:  Patient is a 53 year old female presenting with rehab right shoulder hpi. The history is provided by the patient. No  was used.   Kylie Arellano is a 53 year old female with a right shoulder (right handed) condition.  Condition occurred with:  Unknown cause.  Condition occurred: for unknown reasons.  This is a new condition  Patient reports insidious onset of right shoulder pain when it would wake her up at night.This started 2-3 months ago. She started to get pain into the right upper arm the last couple of weeks. She also has chronic neck pain for several years and this pain is aggravating her neck pain. MD order from 9-18-18. .    Patient reports pain:  Anterior, upper arm, posterior and lateral (to R elbow).    Pain is described as stabbing, sharp and aching and is intermittent and reported as 3/10 (up to 8-9/10 when lays on the right side).  Associated symptoms:  Loss of motion/stiffness. Pain is the same all the time (pain based more on what she does).  Symptoms are exacerbated by lying on extremity, lifting, using arm behind back, using arm overhead, using arm at shoulder level and certain positions (8-9/10 pain to lay on the R, 4/10 pain to put shirt over her head,  4/10 pain to fasten bra behind her back, 6/10 pain to reach overhead) and relieved by ice and other (Naproxen).  Since onset symptoms are gradually worsening.        General health as reported by patient is good.  Pertinent medical history includes:  Asthma, high blood pressure and migraines/headaches.  Medical allergies: no.  Other surgeries include:  None reported.  Current medications:  High blood pressure medication.  Current occupation is .  Patient is working in normal job without restrictions.  Primary job tasks include:  Driving and lifting.    Barriers include:  None as reported by the patient.    Red flags:  None as reported by  the patient.                        Objective:  Standing Alignment:    Cervical/Thoracic:  Forward head and thoracic kyphosis increased  Shoulder/UE:  Rounded shoulders                                  Cervical/Thoracic Evaluation                    Spinal Segmental Conclusions:    Level:  Hypo at T1, T2, T3, T4, T5, T6, T7 and T8             Shoulder Evaluation:  ROM:  AROM:    Flexion:  Left:  WNL    Right:  WNL with slight end range ache    Abduction:  Left: WNL   Right:  WNL with painful arc at  and end range                  Extension/Internal Rotation:  Left:  T 7 SP    Right:  T 10 SP with ache          Strength:    Flexion: Left:5/5    Pain: -    Right: 5/5      Pain:  -  Extension:  Left: 5/5     Pain:-    Right: 5/5     Pain:-  Abduction:  Left: 5/5   Pain:-    Right: 4-/5      Pain:++  Adduction:  Left: 5/5     Pain:-    Right: 5/5      Pain:-  Internal Rotation:  Left:5/5      Pain:-    Right: 4+/5      Pain:+  External Rotation:   Left:5/5      Pain:-   Right:4+/5      Pain:-/+      Horizontal Adduction:  Right:/5     Pain:-  Elbow Flexion:  Left:5/5      Pain:-    Right:5/5      Pain:-  Elbow Extension:  Left:5/5      Pain:-    Right:5/5      Pain:-  Stability Testing:  normal      Special Tests:      Right shoulder positive for the following special tests:Impingement  Right shoulder negative for the following special tests:Acrimioclavicular  Palpation:  not assessed                                         General     ROS    Assessment/Plan:    Patient is a 53 year old female with right side shoulder complaints.    Patient has the following significant findings with corresponding treatment plan.                Diagnosis 1:  Shoulder impingement  Pain -  hot/cold therapy, manual therapy, self management, education, directional preference exercise and home program  Decreased joint mobility - manual therapy, therapeutic exercise and home program  Decreased strength - therapeutic exercise,  therapeutic activities and home program  Impaired muscle performance - neuro re-education and home program  Decreased function - therapeutic activities and home program  Impaired posture - neuro re-education and home program    Therapy Evaluation Codes:   1) History comprised of:   Personal factors that impact the plan of care:      None.    Comorbidity factors that impact the plan of care are:      chronic neck pain.     Medications impacting care: None.  2) Examination of Body Systems comprised of:   Body structures and functions that impact the plan of care:      Shoulder.   Activity limitations that impact the plan of care are:      Bathing, Cooking, Dressing, Lifting, Throwing and reaching.  3) Clinical presentation characteristics are:   Evolving/Changing.  4) Decision-Making    Moderate complexity using standardized patient assessment instrument and/or measureable assessment of functional outcome.  Cumulative Therapy Evaluation is: Moderate complexity.    Previous and current functional limitations:  (See Goal Flow Sheet for this information)    Short term and Long term goals: (See Goal Flow Sheet for this information)     Communication ability:  Patient appears to be able to clearly communicate and understand verbal and written communication and follow directions correctly.  Treatment Explanation - The following has been discussed with the patient:   RX ordered/plan of care  Anticipated outcomes  Possible risks and side effects  This patient would benefit from PT intervention to resume normal activities.   Rehab potential is good.    Frequency:  1-2 X week, once daily  Duration:  for 6-10 weeks  Discharge Plan:  Achieve all LTG.  Independent in home treatment program.  Reach maximal therapeutic benefit.    Please refer to the daily flowsheet for treatment today, total treatment time and time spent performing 1:1 timed codes.

## 2018-10-22 DIAGNOSIS — B00.1 RECURRENT COLD SORES: ICD-10-CM

## 2018-10-22 NOTE — TELEPHONE ENCOUNTER
"Requested Prescriptions   Pending Prescriptions Disp Refills     valACYclovir (VALTREX) 500 MG tablet [Pharmacy Med Name: ValACYclovir HCl Oral Tablet 500 MG]  Last Written Prescription Date:  06/12/18  Last Fill Quantity: 135,  # refills: 0   Last Office Visit with SU, LYLE or [facility] prescribing provider:  09/12/18-[facility]   Future Office Visit:    Next 5 appointments (look out 90 days)     Nov 13, 2018  4:15 PM CST   Return Visit with Kinga Ocampo MD   [facility]    [address] [place] [address] [phone]                135 tablet 0     Sig: TAKE 1 TABLET BY MOUTH EVERY DAY AND INCREASE TO 2 TABLETS DAILY FOR 10 DAYS WITH OUTBREAKS    Antivirals for Herpes Protocol Passed    10/22/2018  9:24 AM       Passed - Patient is age 12 or older       Passed - Recent (12 mo) or future (30 days) visit within the authorizing provider's specialty    Patient had office visit in the last 12 months or has a visit in the next 30 days with authorizing provider or within the authorizing provider's specialty.  See \"Patient Info\" tab in inbasket, or \"Choose Columns\" in Meds & Orders section of the refill encounter.             Passed - Normal serum creatinine on file in past 12 months    Recent Labs   Lab Test  09/12/18   0852   CR  0.88               "

## 2018-10-23 RX ORDER — VALACYCLOVIR HYDROCHLORIDE 500 MG/1
TABLET, FILM COATED ORAL
Qty: 135 TABLET | Refills: 0 | Status: SHIPPED | OUTPATIENT
Start: 2018-10-23 | End: 2019-03-10

## 2018-10-30 ENCOUNTER — TELEPHONE (OUTPATIENT)
Dept: OBGYN | Facility: CLINIC | Age: 53
End: 2018-10-30

## 2018-10-30 NOTE — TELEPHONE ENCOUNTER
Pt is past due for f/u pap smear after previous +HPV.  Spoke with pt, states she will call the clinic to schedule.  Norma Kaminski,    Pap Tracking

## 2018-11-12 ENCOUNTER — OFFICE VISIT (OUTPATIENT)
Dept: FAMILY MEDICINE | Facility: CLINIC | Age: 53
End: 2018-11-12
Payer: COMMERCIAL

## 2018-11-12 VITALS
OXYGEN SATURATION: 100 % | BODY MASS INDEX: 26.46 KG/M2 | SYSTOLIC BLOOD PRESSURE: 161 MMHG | WEIGHT: 155 LBS | TEMPERATURE: 98 F | DIASTOLIC BLOOD PRESSURE: 118 MMHG | HEART RATE: 108 BPM | RESPIRATION RATE: 18 BRPM | HEIGHT: 64 IN

## 2018-11-12 DIAGNOSIS — J45.40 MODERATE PERSISTENT ASTHMA WITHOUT COMPLICATION: ICD-10-CM

## 2018-11-12 DIAGNOSIS — N94.9 VAGINAL SYMPTOM: Primary | ICD-10-CM

## 2018-11-12 DIAGNOSIS — Z12.4 SCREENING FOR MALIGNANT NEOPLASM OF CERVIX: ICD-10-CM

## 2018-11-12 DIAGNOSIS — I10 HYPERTENSION GOAL BP (BLOOD PRESSURE) < 140/90: ICD-10-CM

## 2018-11-12 DIAGNOSIS — Z12.11 SCREEN FOR COLON CANCER: ICD-10-CM

## 2018-11-12 LAB
SPECIMEN SOURCE: NORMAL
WET PREP SPEC: NORMAL

## 2018-11-12 PROCEDURE — 87210 SMEAR WET MOUNT SALINE/INK: CPT | Performed by: FAMILY MEDICINE

## 2018-11-12 PROCEDURE — 87591 N.GONORRHOEAE DNA AMP PROB: CPT | Performed by: FAMILY MEDICINE

## 2018-11-12 PROCEDURE — 99214 OFFICE O/P EST MOD 30 MIN: CPT | Performed by: FAMILY MEDICINE

## 2018-11-12 PROCEDURE — G0145 SCR C/V CYTO,THINLAYER,RESCR: HCPCS | Performed by: FAMILY MEDICINE

## 2018-11-12 PROCEDURE — 87624 HPV HI-RISK TYP POOLED RSLT: CPT | Performed by: FAMILY MEDICINE

## 2018-11-12 PROCEDURE — 87491 CHLMYD TRACH DNA AMP PROBE: CPT | Performed by: FAMILY MEDICINE

## 2018-11-12 RX ORDER — MONTELUKAST SODIUM 10 MG/1
10 TABLET ORAL AT BEDTIME
Qty: 90 TABLET | Refills: 3 | Status: SHIPPED | OUTPATIENT
Start: 2018-11-12 | End: 2019-10-29

## 2018-11-12 NOTE — PROGRESS NOTES
SUBJECTIVE:   Kylie Arellano is a 53 year old female who presents to clinic today for the following health issues:      Trichomonas/Vaginal Symptoms  Onset: No symptoms, but was with a partner who is positive for Trichomonas. Last sexual activity 2 weeks ago.    Description:  Vaginal Discharge: none   Itching (Pruritis): no   Burning sensation:  no   Odor: no     Accompanying Signs & Symptoms:  Pain with Urination: no   Abdominal Pain: no   Fever: no     History:   Sexually active: YES  New Partner: YES  Possibility of Pregnancy:  No    Precipitating factors:   Recent Antibiotic Use: no     Alleviating factors:  N/A    Therapies Tried and outcome: None     Asthma - stable with current treatment  HTN - was drinking and staying up at [employer] all weekend and currently taking OTC cold medication.    Problem list and histories reviewed & adjusted, as indicated.  Additional history: as documented    Patient Active Problem List   Diagnosis     Moderate persistent asthma without complication     Seasonal allergic rhinitis     GERD (gastroesophageal reflux disease)     Acne     CARDIOVASCULAR SCREENING; LDL GOAL LESS THAN 160     Hypertension goal BP (blood pressure) < 140/90     S/P LEEP (status post loop electrosurgical excision procedure)- АЛЕКСАНДР 2/3     Atopic rhinitis     Recurrent cold sores     Shoulder impingement syndrome, right     Shoulder pain, right     Past Surgical History:   Procedure Laterality Date     BUNIONECTOMY RT/LT  1985    bilateral surgeries, two separate surgeries     CONIZATION LEEP  2010     SINUS SURGERY      multiple surgeries     SURGICAL HISTORY OF -   2003    Breast augmentation     TONSILLECTOMY  1982       Social History   Substance Use Topics     Smoking status: Never Smoker     Smokeless tobacco: Never Used      Comment: no second hand smoke     Alcohol use 0.0 oz/week     0 Standard drinks or equivalent per week      Comment: occassional     Family History   Problem Relation Age of Onset  "    Connective Tissue Disorder Mother      autoimmune disorder that affects muscles     Cancer Maternal Grandfather      Diabetes Paternal Grandfather      Hypertension No family hx of      Cerebrovascular Disease No family hx of      Thyroid Disease No family hx of      Glaucoma No family hx of      Macular Degeneration No family hx of            Reviewed and updated as needed this visit by clinical staff  Tobacco  Allergies  Meds  Problems  Med Hx  Surg Hx  Fam Hx  Soc Hx        Reviewed and updated as needed this visit by Provider  Allergies  Meds  Problems         ROS:  Constitutional, HEENT, cardiovascular, pulmonary, gi and gu systems are negative, except as otherwise noted.    OBJECTIVE:     BP (!) 161/118 (BP Location: Left arm, Patient Position: Sitting, Cuff Size: Adult Regular)  Pulse 108  Temp 98  F (36.7  C) (Oral)  Resp 18  Ht 5' 3.78\" (1.62 m)  Wt 155 lb (70.3 kg)  SpO2 100%  BMI 26.79 kg/m2  Body mass index is 26.79 kg/(m^2).  GENERAL: healthy, alert and no distress  NECK: no adenopathy, no asymmetry, masses, or scars and thyroid normal to palpation  RESP: lungs clear to auscultation - no rales, rhonchi or wheezes  CV: regular rate and rhythm, normal S1 S2, no S3 or S4, no murmur, click or rub, no peripheral edema and peripheral pulses strong  ABDOMEN: soft, nontender, no hepatosplenomegaly, no masses and bowel sounds normal   (female): normal female external genitalia, normal urethral meatus, vaginal mucosa, normal cervix/adnexa/uterus without masses or discharge  MS: no gross musculoskeletal defects noted, no edema    Diagnostic Test Results:  Results for orders placed or performed in visit on 11/12/18 (from the past 24 hour(s))   Wet prep   Result Value Ref Range    Specimen Description Vagina     Wet Prep No Trichomonas seen     Wet Prep No clue cells seen     Wet Prep No yeast seen        ASSESSMENT/PLAN:     1. Vaginal symptom  Screening  - Wet prep  - Chlamydia trachomatis " PCR  - Neisseria gonorrhoeae PCR    2. Moderate persistent asthma without complication  Stable - refilled  - montelukast (SINGULAIR) 10 MG tablet; Take 1 tablet (10 mg) by mouth At Bedtime  Dispense: 90 tablet; Refill: 3    3. Hypertension goal BP (blood pressure) < 140/90  Get HTN safe cold medications and follow up for recheck in 1 month    4. Screen for colon cancer  screening  - GASTROENTEROLOGY ADULT REF PROCEDURE ONLY [facility] [phone]; [facility]    5. Screening for malignant neoplasm of cervix  screening  - Pap imaged thin layer screen with HPV - recommended age 30 - 65 years (select HPV order below)  - HPV High Risk Types DNA Cervical    The uses and side effects, including black box warnings as appropriate, were discussed in detail.  All patient questions were answered.  The patient was instructed to call immediately if any side effects developed.     FUTURE APPOINTMENTS:       - Make appointment with nurse to check blood pressure in 4 weeks    Lynda Painting MD  [facility]

## 2018-11-12 NOTE — MR AVS SNAPSHOT
After Visit Summary   11/12/2018    Kylie Arellano    MRN: [id number]           Patient Information     Date Of Birth          1965        Visit Information        Provider Department      11/12/2018 5:40 PM Lynda Goins MD [facility]        Today's Diagnoses     Vaginal symptom    -  1    Moderate persistent asthma without complication        Hypertension goal BP (blood pressure) < 140/90        Screen for colon cancer        Screening for malignant neoplasm of cervix          Care Instructions    At [facility], we strive to deliver an exceptional experience to you, every time we see you.  If you receive a survey in the mail, please send us back your thoughts. We really do value your feedback.    Your care team:                            Family Medicine Internal Medicine   MD Shimon Blevins MD Shantel Branch-Fleming, MD Katya Georgiev PA-C Megan Hill, APRN [facility]    Marquis Reich MD Pediatrics   Ray Mcgarry, JEANNE Longoria, CNP MD Janice Rosenberg APRN CNP   MD Aminata Locke MD Deborah Mielke, MD Destiny Moore, APRN CNP      Clinic hours: Monday - Thursday 7 am-7 pm; Fridays 7 am-5 pm.   Urgent care: Monday - Friday 11 am-9 pm; Saturday and Sunday 9 am-5 pm.  Pharmacy : Monday -Thursday 8 am-8 pm; Friday 8 am-6 pm; Saturday and Sunday 9 am-5 pm.     Clinic: [phone]   Pharmacy: [phone]               Follow-ups after your visit        Additional Services     GASTROENTEROLOGY ADULT REF PROCEDURE ONLY [facility] [phone]; [facility]       Last Lab Result: Creatinine (mg/dL)       Date                     Value                 09/12/2018               0.88             ----------  There is no height or weight on file to calculate BMI.     Needed:  No  Language:  English    Patient will be contacted to schedule procedure.     Please be aware  that coverage of these services is subject to the terms and limitations of your health insurance plan.  Call member services at your health plan with any benefit or coverage questions.  Any procedures must be performed at a [facility] facility OR coordinated by your clinic's referral office.    Please bring the following with you to your appointment:    (1) Any X-Rays, CTs or MRIs which have been performed.  Contact the facility where they were done to arrange for  prior to your scheduled appointment.    (2) List of current medications   (3) This referral request   (4) Any documents/labs given to you for this referral                  Follow-up notes from your care team     Return in about 4 weeks (around 12/10/2018) for Ancillary Visit, BP Recheck.      Your next 10 appointments already scheduled     Nov 13, 2018  4:15 PM CST   [facility] Visit with [facility]a [facility]campo MD   [facility]    [id number] [place] [id number] [phone]              Who to contact     If you have questions or need follow up information about today's clinic visit or your schedule please contact [facility] directly at [phone].  Normal or non-critical lab and imaging results will be communicated to you by MyChart, letter or phone within 4 business days after the clinic has received the results. If you do not hear from us within 7 days, please contact the clinic through MyChart or phone. If you have a critical or abnormal lab result, we will notify you by phone as soon as possible.  Submit refill requests through [employer] or call your pharmacy and they will forward the refill request to us. Please allow 3 business days for your refill to be completed.          Additional Information About Your Visit        [employer] Information     [employer] gives you secure access to your electronic health record. If you see a primary care provider, you can also send messages  "to your care team and make appointments. If you have questions, please call your primary care clinic.  If you do not have a primary care provider, please call [phone] and they will assist you.        Care EveryWhere ID     This is your Care EveryWhere ID. This could be used by other organizations to access your [facility] medical records  [id number]        Your Vitals Were     Pulse Temperature Respirations Height Pulse Oximetry BMI (Body Mass Index)    108 98  F (36.7  C) (Oral) 18 5' 3.78\" (1.62 m) 100% 26.79 kg/m2       Blood Pressure from Last 3 Encounters:   11/12/18 (!) 161/118   09/18/18 (!) 165/117   09/12/18 (!) 164/102    Weight from Last 3 Encounters:   11/12/18 155 lb (70.3 kg)   09/18/18 148 lb (67.1 kg)   09/12/18 148 lb 9.6 oz (67.4 kg)              We Performed the Following     Chlamydia trachomatis PCR     GASTROENTEROLOGY ADULT REF PROCEDURE ONLY [facility] [phone]; [facility]     HPV High Risk Types DNA Cervical     Neisseria gonorrhoeae PCR     Pap imaged thin layer screen with HPV - recommended age 30 - 65 years (select HPV order below)     Wet prep          Today's Medication Changes          These changes are accurate as of 11/12/18  6:44 PM.  If you have any questions, ask your nurse or doctor.               These medicines have changed or have updated prescriptions.        Dose/Directions    montelukast 10 MG tablet   Commonly known as:  SINGULAIR   This may have changed:    - how much to take  - how to take this  - when to take this   Used for:  Moderate persistent asthma without complication   Changed by:  Lynda Goins MD        Dose:  10 mg   Take 1 tablet (10 mg) by mouth At Bedtime   Quantity:  90 tablet   Refills:  3            Where to get your medicines      These medications were sent to [id number] - [place], [place] - [address]  [address] [address]     Phone:  [phone]     montelukast 10 " MG tablet                Primary Care Provider Office Phone # Fax #    Lynda Dixon Vladimir Painting [phone]       [address]  [facility] [address]        Equal Access to Services     MARIETTA PACHECO : Hadii aad ku hadlilianao Soomaali, waaxda luqadaha, qaybta kaalmada adeegyada, waxedinson adityain haymarisan reynaisai samson gui cordero. So [facility] [phone].    ATENCIÓN: Si habla español, tiene a new disposición servicios gratuitos de asistencia lingüística. Llame al [phone].    We comply with applicable federal civil rights laws and [place] laws. We do not discriminate on the basis of race, color, national origin, age, disability, sex, sexual orientation, or gender identity.            Thank you!     Thank you for choosing [facility]  for your care. Our goal is always to provide you with excellent care. Hearing back from our patients is one way we can continue to improve our services. Please take a few minutes to complete the written survey that you may receive in the mail after your visit with us. Thank you!             Your Updated Medication List - Protect others around you: Learn how to safely use, store and throw away your medicines at www.disposemymeds.org.          This list is accurate as of 11/12/18  6:44 PM.  Always use your most recent med list.                   Brand Name Dispense Instructions for use Diagnosis    desonide 0.05 % cream    DESOWEN    60 g    Apply sparingly to Legs, up to twice daily as needed.    Intrinsic atopic dermatitis       fluticasone 50 MCG/ACT spray    FLONASE          ipratropium - albuterol 0.5 mg/2.5 mg/3 mL 0.5-2.5 (3) MG/3ML neb solution    DUONEB    30 vial    Take 1 vial (3 mLs) by nebulization every 4 hours as needed for shortness of breath / dyspnea or wheezing    Acute bronchitis       levocetirizine 5 MG tablet    XYZAL    90 tablet    Take 1 tablet (5 mg) by mouth every evening    Mild persistent asthma without complication        levonorgestrel-ethinyl estradiol per tablet    TRIVORA (28)    112 tablet    One tablet continuously x 3 months, then off one week, repeat cycle x one year    Encounter for surveillance of contraceptive pills       losartan 100 MG tablet    COZAAR    90 tablet    Take 1 tablet (100 mg) by mouth daily    Hypertension goal BP (blood pressure) < 140/90       mometasone 220 MCG/INH Inhaler    ASMANEX 120 METERED DOSES    1 Inhaler    Inhale 2 puffs into the lungs daily    Mild persistent asthma without complication       montelukast 10 MG tablet    SINGULAIR    90 tablet    Take 1 tablet (10 mg) by mouth At Bedtime    Moderate persistent asthma without complication       naproxen 500 MG tablet    NAPROSYN    60 tablet    Take 1 tablet (500 mg) by mouth 2 times daily (with meals)    Shoulder impingement syndrome, right       omeprazole 40 MG capsule    priLOSEC    90 capsule    Take 1 capsule (40 mg) by mouth daily    Gastroesophageal reflux disease without esophagitis       PATADAY 0.2 % Soln   Generic drug:  olopatadine HCl      Apply 1 drop to eye as needed. Each eye    HTN (hypertension)       SEREVENT DISKUS 50 MCG/DOSE diskus inhaler   Generic drug:  salmeterol           valACYclovir 500 MG tablet    VALTREX    135 tablet    TAKE 1 TABLET BY MOUTH EVERY DAY AND INCREASE TO 2 TABLETS DAILY FOR 10 DAYS WITH OUTBREAKS    Recurrent cold sores       VENTOLIN  (90 Base) MCG/ACT inhaler   Generic drug:  albuterol      as needed

## 2018-11-13 ASSESSMENT — ASTHMA QUESTIONNAIRES: ACT_TOTALSCORE: 25

## 2018-11-13 NOTE — PATIENT INSTRUCTIONS
At [facility], we strive to deliver an exceptional experience to you, every time we see you.  If you receive a survey in the mail, please send us back your thoughts. We really do value your feedback.    Your care team:                            Family Medicine Internal Medicine   MD Shimon Blevins MD Shantel Branch-Fleming, MD Katya Georgiev PA-C Megan Hill, APRN CECILLE Reich MD Pediatrics   Ray Mcgarry, JEANNE Longoria, MD Janice Butler APRN CNP   MD Aminata Locke MD Deborah Mielke, MD Destiny Moore, APRN [facility]      Clinic hours: Monday - Thursday 7 am-7 pm; Fridays 7 am-5 pm.   Urgent care: Monday - Friday 11 am-9 pm; Saturday and Sunday 9 am-5 pm.  Pharmacy : Monday -Thursday 8 am-8 pm; Friday 8 am-6 pm; Saturday and Sunday 9 am-5 pm.     Clinic: [phone]   Pharmacy: [phone]

## 2018-11-14 LAB
C TRACH DNA SPEC QL NAA+PROBE: NEGATIVE
N GONORRHOEA DNA SPEC QL NAA+PROBE: NEGATIVE
SPECIMEN SOURCE: NORMAL
SPECIMEN SOURCE: NORMAL

## 2018-11-14 NOTE — PROGRESS NOTES
Ms. Arellano,    Neither gonorrhea nor chlamydia were found.     Please contact the clinic if you have additional questions.  Thank you.    Sincerely,    Lynda Painting

## 2018-11-16 LAB
COPATH REPORT: NORMAL
PAP: NORMAL

## 2018-11-19 LAB
FINAL DIAGNOSIS: ABNORMAL
HPV HR 12 DNA CVX QL NAA+PROBE: POSITIVE
HPV16 DNA SPEC QL NAA+PROBE: NEGATIVE
HPV18 DNA SPEC QL NAA+PROBE: NEGATIVE
SPECIMEN DESCRIPTION: ABNORMAL
SPECIMEN SOURCE CVX/VAG CYTO: ABNORMAL

## 2018-12-04 ENCOUNTER — MYC MEDICAL ADVICE (OUTPATIENT)
Dept: FAMILY MEDICINE | Facility: CLINIC | Age: 53
End: 2018-12-04

## 2018-12-04 ENCOUNTER — E-VISIT (OUTPATIENT)
Dept: FAMILY MEDICINE | Facility: CLINIC | Age: 53
End: 2018-12-04
Payer: COMMERCIAL

## 2018-12-04 DIAGNOSIS — N89.8 VAGINAL DISCHARGE: Primary | ICD-10-CM

## 2018-12-04 PROCEDURE — 99444 ZZC PHYSICIAN ONLINE EVALUATION & MANAGEMENT SERVICE: CPT | Performed by: FAMILY MEDICINE

## 2018-12-04 NOTE — TELEPHONE ENCOUNTER
E-visit instructions given to Kylie to further discuss possible infection.     Carrillo Silverman RN, BSN

## 2018-12-08 DIAGNOSIS — I10 HYPERTENSION GOAL BP (BLOOD PRESSURE) < 140/90: ICD-10-CM

## 2018-12-08 NOTE — TELEPHONE ENCOUNTER
"Requested Prescriptions   Pending Prescriptions Disp Refills     losartan (COZAAR) 100 MG tablet [Pharmacy Med Name: Losartan Potassium Oral Tablet 100 MG]    Last Written Prescription Date:  9/12/18  Last Fill Quantity: 90,  # refills: 0   Last Office Visit with G, P or [facility] prescribing provider:  11/12/18   Future Office Visit:      90 tablet 0     Sig: Take 1 tablet (100 mg) by mouth daily    Angiotensin-II Receptors Failed    12/8/2018  7:09 AM       Failed - Blood pressure under 140/90 in past 12 months    BP Readings from Last 3 Encounters:   11/12/18 (!) 161/118   09/18/18 (!) 165/117   09/12/18 (!) 164/102                Passed - Recent (12 mo) or future (30 days) visit within the authorizing provider's specialty    Patient had office visit in the last 12 months or has a visit in the next 30 days with authorizing provider or within the authorizing provider's specialty.  See \"Patient Info\" tab in inbasket, or \"Choose Columns\" in Meds & Orders section of the refill encounter.             Passed - Patient is age 18 or older       Passed - No active pregnancy on record       Passed - Normal serum creatinine on file in past 12 months    Recent Labs   Lab Test  09/12/18   0852   CR  0.88            Passed - Normal serum potassium on file in past 12 months    Recent Labs   Lab Test  09/12/18   0852   POTASSIUM  3.6                   Passed - No positive pregnancy test in past 12 months              Henry Faarax  [facility]  "

## 2018-12-11 RX ORDER — LOSARTAN POTASSIUM 100 MG/1
TABLET ORAL
Qty: 90 TABLET | Refills: 0 | Status: SHIPPED | OUTPATIENT
Start: 2018-12-11 | End: 2019-03-30

## 2018-12-11 NOTE — TELEPHONE ENCOUNTER
Routing refill request to provider for review/approval because:  Blood pressure greater than 140/90  Reshma Forte RN

## 2018-12-18 ENCOUNTER — OFFICE VISIT (OUTPATIENT)
Dept: URGENT CARE | Facility: URGENT CARE | Age: 53
End: 2018-12-18
Payer: COMMERCIAL

## 2018-12-18 VITALS
SYSTOLIC BLOOD PRESSURE: 168 MMHG | BODY MASS INDEX: 24.59 KG/M2 | DIASTOLIC BLOOD PRESSURE: 115 MMHG | TEMPERATURE: 98.2 F | RESPIRATION RATE: 16 BRPM | OXYGEN SATURATION: 98 % | HEART RATE: 104 BPM | WEIGHT: 144 LBS | HEIGHT: 64 IN

## 2018-12-18 DIAGNOSIS — J20.9 ACUTE BRONCHITIS WITH COEXISTING CONDITION REQUIRING PROPHYLACTIC TREATMENT: Primary | ICD-10-CM

## 2018-12-18 PROCEDURE — 99214 OFFICE O/P EST MOD 30 MIN: CPT | Performed by: NURSE PRACTITIONER

## 2018-12-18 RX ORDER — AZITHROMYCIN 250 MG/1
TABLET, FILM COATED ORAL
Qty: 6 TABLET | Refills: 0 | Status: SHIPPED | OUTPATIENT
Start: 2018-12-18 | End: 2018-12-23

## 2018-12-18 RX ORDER — ALBUTEROL SULFATE 90 UG/1
2 AEROSOL, METERED RESPIRATORY (INHALATION) EVERY 6 HOURS PRN
Qty: 1 INHALER | Refills: 0 | Status: SHIPPED | OUTPATIENT
Start: 2018-12-18 | End: 2019-06-25

## 2018-12-18 RX ORDER — PREDNISONE 5 MG/1
TABLET ORAL
Qty: 35 TABLET | Refills: 0 | Status: SHIPPED | OUTPATIENT
Start: 2018-12-18 | End: 2019-06-25

## 2018-12-18 ASSESSMENT — ENCOUNTER SYMPTOMS
WHEEZING: 1
COUGH: 1
SHORTNESS OF BREATH: 1

## 2018-12-18 ASSESSMENT — MIFFLIN-ST. JEOR: SCORE: 1249.21

## 2018-12-18 ASSESSMENT — PAIN SCALES - GENERAL: PAINLEVEL: NO PAIN (0)

## 2018-12-18 NOTE — PROGRESS NOTES
SUBJECTIVE:   Kylie Arellano is a 53 year old female presenting with a chief complaint of   Chief Complaint   Patient presents with     Cough       She is an established patient of [facility].    URI Adult    Onset of symptoms was 1 month(s) ago.  Course of illness is worsening.    Severity moderate  Current and Associated symptoms: cough - productive, wheezing and shortness of breath  Treatment measures tried include Inhaler (name: albuterol).  Predisposing factors include HX of asthma.      Review of Systems   Respiratory: Positive for cough, shortness of breath and wheezing.    All other systems reviewed and are negative.      Past Medical History:   Diagnosis Date     Cervical dysplasia, moderate 10/10    АЛЕКСАНДР 2-3      Cervical high risk HPV (human papillomavirus) test positive 04/19/2017, 11/12/18    See problem list     Dermatitis      GERD (gastroesophageal reflux disease)      HSV-1 (herpes simplex virus 1) infection      HTN 2005     Hypertension goal BP (blood pressure) < 140/90 2/10/2011     Mild depression (H)      Mild persistent asthma 2007     S/P LEEP of cervix 10/10     Seasonal allergic rhinitis      Family History   Problem Relation Age of Onset     Connective Tissue Disorder Mother         autoimmune disorder that affects muscles     Cancer Maternal Grandfather      Diabetes Paternal Grandfather      Hypertension No family hx of      Cerebrovascular Disease No family hx of      Thyroid Disease No family hx of      Glaucoma No family hx of      Macular Degeneration No family hx of      Current Outpatient Medications   Medication Sig Dispense Refill     albuterol (PROAIR HFA/PROVENTIL HFA/VENTOLIN HFA) 108 (90 Base) MCG/ACT inhaler Inhale 2 puffs into the lungs every 6 hours as needed for shortness of breath / dyspnea or wheezing 1 Inhaler 0     azithromycin (ZITHROMAX) 250 MG tablet Two tablets first day, then one tablet daily for four days. 6 tablet 0     desonide (DESOWEN) 0.05 % cream Apply  sparingly to Legs, up to twice daily as needed. 60 g 2     fluticasone (FLONASE) 50 MCG/ACT nasal spray   1     ipratropium - albuterol 0.5 mg/2.5 mg/3 mL (DUONEB) 0.5-2.5 (3) MG/3ML nebulization Take 1 vial (3 mLs) by nebulization every 4 hours as needed for shortness of breath / dyspnea or wheezing 30 vial 1     levocetirizine (XYZAL) 5 MG tablet Take 1 tablet (5 mg) by mouth every evening 90 tablet 3     levonorgestrel-ethinyl estradiol (TRIVORA, 28,) per tablet One tablet continuously x 3 months, then off one week, repeat cycle x one year 112 tablet 4     losartan (COZAAR) 100 MG tablet Take 1 tablet (100 mg) by mouth daily 90 tablet 0     mometasone (ASMANEX 120 METERED DOSES) 220 MCG/INH Inhaler Inhale 2 puffs into the lungs daily 1 Inhaler 11     montelukast (SINGULAIR) 10 MG tablet Take 1 tablet (10 mg) by mouth At Bedtime 90 tablet 3     naproxen (NAPROSYN) 500 MG tablet Take 1 tablet (500 mg) by mouth 2 times daily (with meals) 60 tablet 1     Olopatadine HCl (PATADAY) 0.2 % SOLN Apply 1 drop to eye as needed. Each eye       omeprazole (PRILOSEC) 40 MG capsule Take 1 capsule (40 mg) by mouth daily 90 capsule 0     predniSONE (DELTASONE) 5 MG tablet Take 2 tablets (10 mg) twice a day for 3 days,   then 1 tablet (5mg) tablet three times a day for 3 days  Then 1 tablet (5mg) tablet twice a day for 3 days  The 1 tablet (5 mg) tablets once a day for 3 days. 35 tablet 0     SEREVENT DISKUS 50 MCG/DOSE diskus inhaler   2     valACYclovir (VALTREX) 500 MG tablet TAKE 1 TABLET BY MOUTH EVERY DAY AND INCREASE TO 2 TABLETS DAILY FOR 10 DAYS WITH OUTBREAKS 135 tablet 0     VENTOLIN  (90 BASE) MCG/ACT IN AERS as needed       albuterol (2.5 MG/3ML) 0.083% nebulizer solution Take 1 vial (2.5 mg) by nebulization once for 1 dose 3 mL 0     Social History     Tobacco Use     Smoking status: Never Smoker     Smokeless tobacco: Never Used     Tobacco comment: no second hand smoke   Substance Use Topics     Alcohol use:  "Yes     Alcohol/week: 0.0 oz     Comment: occassional       OBJECTIVE  BP (!) 168/115 (BP Location: Left arm, Patient Position: Sitting, Cuff Size: Adult Regular)   Pulse 104   Temp 98.2  F (36.8  C) (Oral)   Resp 16   Ht 1.619 m (5' 3.75\")   Wt 65.3 kg (144 lb)   SpO2 98%   BMI 24.91 kg/m      Physical Exam   HENT:   Right Ear: External ear normal.   Left Ear: External ear normal.   Nose: Nose normal.   Mouth/Throat: Uvula is midline and oropharynx is clear and moist.   Cardiovascular: Normal rate and normal heart sounds.   Pulmonary/Chest: Effort normal. She has wheezes (scattered on expiration).       ASSESSMENT:      ICD-10-CM    1. Acute bronchitis with coexisting condition requiring prophylactic treatment J20.9 azithromycin (ZITHROMAX) 250 MG tablet     predniSONE (DELTASONE) 5 MG tablet     albuterol (PROAIR HFA/PROVENTIL HFA/VENTOLIN HFA) 108 (90 Base) MCG/ACT inhaler        Serious Comorbid Conditions:  Adult:  Asthma    PLAN:  Albuterol every 4 hours for the next 48 hours, then as needed.  Patient educational/instructional material provided including reasons for follow-up    The patient indicates understanding of these issues and agrees with the plan.        Patient Instructions       Patient Education     Viral or Bacterial Bronchitis with Wheezing (Adult)    Bronchitis is an infection of the air passages. It often occurs during a cold and is usually caused by a virus. Symptoms include cough with mucus (phlegm) and low-grade fever. This illness is contagious during the first few days and is spread through the air by coughing and sneezing, or by direct contact (touching the sick person and then touching your own eyes, nose, or mouth).  If there is a lot of inflammation, air flow is restricted. The air passages may also go into spasm, especially if you have asthma. This causes wheezing and difficulty breathing even in people who do not have asthma.  Bronchitis usually lasts 7 to 14 days. The wheezing " should improve with treatment during the first week. An inhaler is often prescribed to relax the air passages and stop wheezing. Antibiotics will be prescribed if your doctor thinks there is also a secondary bacterial infection.  Home care    If symptoms are severe, rest at home for the first 2 to 3 days. When you go back to your usual activities, don't let yourself get too tired.    Dont s'moke. Also avoid being exposed to secondhand smoke.    You may use over-the-counter medicine to control fever or pain, unless another medicine was prescribed. Note: If you have chronic liver or kidney disease or have ever had a stomach ulcer or gastrointestinal bleeding, talk with your healthcare provider before using these medicines. Also talk to your provider if you are taking medicine to prevent blood clots.) Aspirin should never be given to anyone younger than 18 years of age who is ill with a viral infection or fever. It may cause severe liver or brain damage.    Your appetite may be poor, so a light diet is fine. Stay well hydrated by drinking 6 to 8 glasses of fluids per day (such as water, soft drinks, sports drinks, juices, tea, or soup). Extra fluids will help loosen secretions in the nose and lungs.    Over-the-counter cough, cold, and sore-throat medicines will not shorten the length of the illness, but they may be helpful to reduce symptoms. (Note: Don't use decongestants if you have high blood pressure.)    If you were given an inhaler, use it exactly as directed. If you need to use it more often than prescribed, your condition may be worsening. If this happens, contact your healthcare provider.    If prescribed, finish all antibiotic medicine, even if you are feeling better after only a few days.  Follow-up care  Follow up with your healthcare provider, or as advised. If you had an X-ray or ECG (electrocardiogram), a specialist will review it. You will be notified of any new findings that may affect your care.  If  you are age 65 or older, or if you have a chronic lung disease or condition that affects your immune system, or you smoke, ask your healthcare provider about getting a pneumococcal vaccine and a yearly flu shot (influenza vaccine).  When to seek medical advice  Call your healthcare provider right away if any of these occur:    Fever of 100.4 F (38 C) or higher, or as directed by your healthcare provider    Coughing up increasing amounts of colored sputum    Weakness, drowsiness, headache, facial pain, ear pain, or a stiff neck  Call 911  Call 911 if any of these occur.    Coughing up blood    Worsening weakness, drowsiness, headache, or stiff neck    Increased wheezing not helped with medication, shortness of breath, or pain with breathing   Date Last Reviewed: 6/1/2018 2000-2018 [employer]. [address] [place] [address], [place] [address]. All rights reserved. This information is not intended as a substitute for professional medical care. Always follow your healthcare professional's instructions.

## 2018-12-18 NOTE — PATIENT INSTRUCTIONS
Patient Education     Viral or Bacterial Bronchitis with Wheezing (Adult)    Bronchitis is an infection of the air passages. It often occurs during a cold and is usually caused by a virus. Symptoms include cough with mucus (phlegm) and low-grade fever. This illness is contagious during the first few days and is spread through the air by coughing and sneezing, or by direct contact (touching the sick person and then touching your own eyes, nose, or mouth).  If there is a lot of inflammation, air flow is restricted. The air passages may also go into spasm, especially if you have asthma. This causes wheezing and difficulty breathing even in people who do not have asthma.  Bronchitis usually lasts 7 to 14 days. The wheezing should improve with treatment during the first week. An inhaler is often prescribed to relax the air passages and stop wheezing. Antibiotics will be prescribed if your doctor thinks there is also a secondary bacterial infection.  Home care    If symptoms are severe, rest at home for the first 2 to 3 days. When you go back to your usual activities, don't let yourself get too tired.    Dont s'moke. Also avoid being exposed to secondhand smoke.    You may use over-the-counter medicine to control fever or pain, unless another medicine was prescribed. Note: If you have chronic liver or kidney disease or have ever had a stomach ulcer or gastrointestinal bleeding, talk with your healthcare provider before using these medicines. Also talk to your provider if you are taking medicine to prevent blood clots.) Aspirin should never be given to anyone younger than 18 years of age who is ill with a viral infection or fever. It may cause severe liver or brain damage.    Your appetite may be poor, so a light diet is fine. Stay well hydrated by drinking 6 to 8 glasses of fluids per day (such as water, soft drinks, sports drinks, juices, tea, or soup). Extra fluids will help loosen secretions in the nose and  lungs.    Over-the-counter cough, cold, and sore-throat medicines will not shorten the length of the illness, but they may be helpful to reduce symptoms. (Note: Don't use decongestants if you have high blood pressure.)    If you were given an inhaler, use it exactly as directed. If you need to use it more often than prescribed, your condition may be worsening. If this happens, contact your healthcare provider.    If prescribed, finish all antibiotic medicine, even if you are feeling better after only a few days.  Follow-up care  Follow up with your healthcare provider, or as advised. If you had an X-ray or ECG (electrocardiogram), a specialist will review it. You will be notified of any new findings that may affect your care.  If you are age 65 or older, or if you have a chronic lung disease or condition that affects your immune system, or you smoke, ask your healthcare provider about getting a pneumococcal vaccine and a yearly flu shot (influenza vaccine).  When to seek medical advice  Call your healthcare provider right away if any of these occur:    Fever of 100.4 F (38 C) or higher, or as directed by your healthcare provider    Coughing up increasing amounts of colored sputum    Weakness, drowsiness, headache, facial pain, ear pain, or a stiff neck  Call 911  Call 911 if any of these occur.    Coughing up blood    Worsening weakness, drowsiness, headache, or stiff neck    Increased wheezing not helped with medication, shortness of breath, or pain with breathing   Date Last Reviewed: 6/1/2018 2000-2018 [employer]. [address] [place] [address], [place] [address]. All rights reserved. This information is not intended as a substitute for professional medical care. Always follow your healthcare professional's instructions.

## 2019-01-03 DIAGNOSIS — K21.9 GASTROESOPHAGEAL REFLUX DISEASE WITHOUT ESOPHAGITIS: ICD-10-CM

## 2019-01-04 RX ORDER — OMEPRAZOLE 40 MG/1
CAPSULE, DELAYED RELEASE ORAL
Qty: 90 CAPSULE | Refills: 3 | Status: SHIPPED | OUTPATIENT
Start: 2019-01-04 | End: 2019-06-25

## 2019-01-04 NOTE — TELEPHONE ENCOUNTER
"Requested Prescriptions   Pending Prescriptions Disp Refills     omeprazole (PRILOSEC) 40 MG DR capsule [Pharmacy Med Name: Omeprazole Oral Capsule Delayed Release 40 MG] 90 capsule 0    Last Written Prescription Date:  9/10/18  Last Fill Quantity: 90,  # refills: 0   Last Office Visit with G, P or [facility] prescribing provider:  11/12/18   Future Office Visit:      Sig: Take 1 capsule (40 mg) by mouth daily    PPI Protocol Passed - 1/3/2019  4:58 PM       Passed - Not on Clopidogrel (unless Pantoprazole ordered)       Passed - No diagnosis of osteoporosis on record       Passed - Recent (12 mo) or future (30 days) visit within the authorizing provider's specialty    Patient had office visit in the last 12 months or has a visit in the next 30 days with authorizing provider or within the authorizing provider's specialty.  See \"Patient Info\" tab in inbasket, or \"Choose Columns\" in Meds & Orders section of the refill encounter.             Passed - Patient is age 18 or older       Passed - No active pregnacy on record       Passed - No positive pregnancy test in past 12 months          "

## 2019-02-01 NOTE — Clinical Note
Patient with palpitations on and off that started last night, gets SOB during palpitations, No CP Thanks for the referral. Her exam was encouraging so we are going to hold on the MRI for now and see how she does w/ PT. Will keep you updated. - Jr

## 2019-02-03 PROBLEM — M25.511 SHOULDER PAIN, RIGHT: Status: RESOLVED | Noted: 2018-09-27 | Resolved: 2019-02-03

## 2019-02-04 NOTE — PROGRESS NOTES
Please refer to initial evaluation of 9-27-18 for patient status as did not return for further visits. Discharge at this time. Danna House PT

## 2019-02-28 ENCOUNTER — MYC MEDICAL ADVICE (OUTPATIENT)
Dept: FAMILY MEDICINE | Facility: CLINIC | Age: 54
End: 2019-02-28

## 2019-02-28 DIAGNOSIS — J45.30 MILD PERSISTENT ASTHMA WITHOUT COMPLICATION: ICD-10-CM

## 2019-02-28 DIAGNOSIS — J20.9 ACUTE BRONCHITIS: ICD-10-CM

## 2019-03-01 NOTE — TELEPHONE ENCOUNTER
Routing refill request to provider for review/approval because:  Medication is reported/historical - provider please see [email or web address] message.     Rosa Garcia RN

## 2019-03-04 RX ORDER — FLUTICASONE PROPIONATE 50 MCG
2 SPRAY, SUSPENSION (ML) NASAL DAILY
Qty: 16 G | Refills: 0 | Status: SHIPPED | OUTPATIENT
Start: 2019-03-04 | End: 2019-06-25

## 2019-03-04 RX ORDER — IPRATROPIUM BROMIDE AND ALBUTEROL SULFATE 2.5; .5 MG/3ML; MG/3ML
1 SOLUTION RESPIRATORY (INHALATION) EVERY 4 HOURS PRN
Qty: 30 VIAL | Refills: 0 | Status: SHIPPED | OUTPATIENT
Start: 2019-03-04 | End: 2019-06-25

## 2019-03-04 NOTE — TELEPHONE ENCOUNTER
Medications refilled for 1 month only. She is over due for blood pressure recheck.    Lynda Floyd M.D.

## 2019-03-04 NOTE — TELEPHONE ENCOUNTER
Called and spoke to patient and scheduled a recheck on 3/13/19.  /Rola Naranjo MA/  [employer] and [employer]

## 2019-03-10 DIAGNOSIS — B00.1 RECURRENT COLD SORES: ICD-10-CM

## 2019-03-12 RX ORDER — VALACYCLOVIR HYDROCHLORIDE 500 MG/1
TABLET, FILM COATED ORAL
Qty: 135 TABLET | Refills: 0 | Status: SHIPPED | OUTPATIENT
Start: 2019-03-12 | End: 2019-11-04

## 2019-03-12 NOTE — TELEPHONE ENCOUNTER
Prescription approved per [facility] Refill Protocol.      Carrillo Silverman RN, BSN

## 2019-03-30 DIAGNOSIS — I10 HYPERTENSION GOAL BP (BLOOD PRESSURE) < 140/90: ICD-10-CM

## 2019-03-30 NOTE — TELEPHONE ENCOUNTER
"Requested Prescriptions   Pending Prescriptions Disp Refills     losartan (COZAAR) 100 MG tablet [Pharmacy Med Name: Losartan Potassium Oral Tablet 100 MG] 90 tablet 0          Last Written Prescription Date:  12/11/18  Last Fill Quantity: 90,  # refills: 0   Last Office Visit with G, P or [facility] prescribing provider:  11/12/18   Future Office Visit:      Sig: Take 1 tablet (100 mg) by mouth daily    Angiotensin-II Receptors Failed - 3/30/2019  7:01 AM       Failed - Blood pressure under 140/90 in past 12 months    BP Readings from Last 3 Encounters:   12/18/18 (!) 168/115   11/12/18 (!) 161/118   09/18/18 (!) 165/117                Passed - Recent (12 mo) or future (30 days) visit within the authorizing provider's specialty    Patient had office visit in the last 12 months or has a visit in the next 30 days with authorizing provider or within the authorizing provider's specialty.  See \"Patient Info\" tab in inbasket, or \"Choose Columns\" in Meds & Orders section of the refill encounter.             Passed - Medication is active on med list       Passed - Patient is age 18 or older       Passed - No active pregnancy on record       Passed - Normal serum creatinine on file in past 12 months    Recent Labs   Lab Test 09/12/18  0852   CR 0.88            Passed - Normal serum potassium on file in past 12 months    Recent Labs   Lab Test 09/12/18  0852   POTASSIUM 3.6                   Passed - No positive pregnancy test in past 12 months            Henry Faarax  [facility]    "

## 2019-04-01 DIAGNOSIS — J45.30 MILD PERSISTENT ASTHMA WITHOUT COMPLICATION: ICD-10-CM

## 2019-04-01 RX ORDER — LOSARTAN POTASSIUM 100 MG/1
100 TABLET ORAL DAILY
Qty: 30 TABLET | Refills: 0 | Status: SHIPPED | OUTPATIENT
Start: 2019-04-01 | End: 2019-06-25

## 2019-04-01 NOTE — TELEPHONE ENCOUNTER
Routing refill request to provider for review/approval because:  Blood pressure greater than 140/90 in last family practice visit  Reshma Forte RN

## 2019-04-01 NOTE — TELEPHONE ENCOUNTER
"Requested Prescriptions   Pending Prescriptions Disp Refills     SEREVENT DISKUS 50 MCG/DOSE inhaler [Pharmacy Med Name: Serevent Diskus Inhalation Aerosol Powder Breath Activated 50 MCG/DOSE] 60 each 0     Sig: Inhale 1 puff into the lungs 2 times daily    Long-Acting Beta Agonist Inhalers Protocol  Passed - 4/1/2019  7:07 AM       Passed - Patient is age 12 or older       Passed - Asthma control assessment score within normal limits in last 6 months    Please review ACT score.          Passed - Order for Serevent, Striverdi, or Foradil and pt has steroid inhaler       Passed - Medication is active on med list       Passed - Recent (6 mo) or future (30 days) visit within the authorizing provider's specialty    Patient had office visit in the last 6 months or has a visit in the next 30 days with authorizing provider or within the authorizing provider's specialty.  See \"Patient Info\" tab in inbasket, or \"Choose Columns\" in Meds & Orders section of the refill encounter.            Last Written Prescription Date:  3/4/19  Last Fill Quantity: 60,  # refills: 0   Last office visit: 11/12/2018 with prescribing provider:  Lynda Goins     Future Office Visit:      "

## 2019-04-03 NOTE — TELEPHONE ENCOUNTER
Routing refill request to provider for review/approval because:  Ema given x1 and patient did not follow up, please advise    Imelda Correa RN

## 2019-05-04 DIAGNOSIS — J45.30 MILD PERSISTENT ASTHMA WITHOUT COMPLICATION: ICD-10-CM

## 2019-05-07 NOTE — TELEPHONE ENCOUNTER
Routing refill request to provider for review/approval because:  Mea given x1 and patient did not follow up, please advise  Rachelle Frazier RN

## 2019-05-21 ENCOUNTER — TELEPHONE (OUTPATIENT)
Dept: FAMILY MEDICINE | Facility: CLINIC | Age: 54
End: 2019-05-21

## 2019-05-21 NOTE — TELEPHONE ENCOUNTER
Pt is past due for Pap follow up  Reminder letter has been sent  LMTC her clinic with any questions or to schedule    Florecita Rogers,   Pap Tracking

## 2019-06-25 ENCOUNTER — OFFICE VISIT (OUTPATIENT)
Dept: FAMILY MEDICINE | Facility: CLINIC | Age: 54
End: 2019-06-25
Payer: COMMERCIAL

## 2019-06-25 VITALS
HEART RATE: 101 BPM | SYSTOLIC BLOOD PRESSURE: 173 MMHG | TEMPERATURE: 98.3 F | WEIGHT: 137 LBS | HEIGHT: 63 IN | BODY MASS INDEX: 24.27 KG/M2 | DIASTOLIC BLOOD PRESSURE: 108 MMHG | RESPIRATION RATE: 18 BRPM | OXYGEN SATURATION: 98 %

## 2019-06-25 DIAGNOSIS — L20.84 INTRINSIC ATOPIC DERMATITIS: ICD-10-CM

## 2019-06-25 DIAGNOSIS — Z12.11 SCREEN FOR COLON CANCER: ICD-10-CM

## 2019-06-25 DIAGNOSIS — K21.9 GASTROESOPHAGEAL REFLUX DISEASE WITHOUT ESOPHAGITIS: ICD-10-CM

## 2019-06-25 DIAGNOSIS — J45.41 MODERATE PERSISTENT ASTHMA WITH ACUTE EXACERBATION: Primary | ICD-10-CM

## 2019-06-25 DIAGNOSIS — I10 HYPERTENSION GOAL BP (BLOOD PRESSURE) < 140/90: ICD-10-CM

## 2019-06-25 PROCEDURE — 99214 OFFICE O/P EST MOD 30 MIN: CPT | Performed by: PHYSICIAN ASSISTANT

## 2019-06-25 RX ORDER — GUAIFENESIN AND DEXTROMETHORPHAN HYDROBROMIDE 1200; 60 MG/1; MG/1
1 TABLET, EXTENDED RELEASE ORAL 2 TIMES DAILY
Qty: 28 TABLET | Refills: 0 | Status: SHIPPED | OUTPATIENT
Start: 2019-06-25 | End: 2019-11-04

## 2019-06-25 RX ORDER — PREDNISONE 20 MG/1
40 TABLET ORAL DAILY
Qty: 10 TABLET | Refills: 0 | Status: SHIPPED | OUTPATIENT
Start: 2019-06-25 | End: 2019-06-30

## 2019-06-25 RX ORDER — IPRATROPIUM BROMIDE AND ALBUTEROL SULFATE 2.5; .5 MG/3ML; MG/3ML
1 SOLUTION RESPIRATORY (INHALATION) EVERY 4 HOURS PRN
Qty: 30 VIAL | Refills: 3 | Status: SHIPPED | OUTPATIENT
Start: 2019-06-25 | End: 2019-11-04

## 2019-06-25 RX ORDER — FLUTICASONE PROPIONATE 50 MCG
2 SPRAY, SUSPENSION (ML) NASAL DAILY
Qty: 16 G | Refills: 3 | Status: SHIPPED | OUTPATIENT
Start: 2019-06-25 | End: 2019-11-04

## 2019-06-25 RX ORDER — ALBUTEROL SULFATE 0.83 MG/ML
2.5 SOLUTION RESPIRATORY (INHALATION) ONCE
Qty: 3 ML | Status: CANCELLED | OUTPATIENT
Start: 2019-06-25 | End: 2019-06-25

## 2019-06-25 RX ORDER — AZITHROMYCIN 250 MG/1
TABLET, FILM COATED ORAL
Qty: 6 TABLET | Refills: 0 | Status: SHIPPED | OUTPATIENT
Start: 2019-06-25 | End: 2019-06-30

## 2019-06-25 RX ORDER — LOSARTAN POTASSIUM 100 MG/1
100 TABLET ORAL DAILY
Qty: 90 TABLET | Refills: 3 | Status: SHIPPED | OUTPATIENT
Start: 2019-06-25 | End: 2019-11-04

## 2019-06-25 RX ORDER — METOPROLOL SUCCINATE 25 MG/1
25 TABLET, EXTENDED RELEASE ORAL 2 TIMES DAILY
Qty: 60 TABLET | Refills: 3 | Status: SHIPPED | OUTPATIENT
Start: 2019-06-25 | End: 2020-09-09

## 2019-06-25 RX ORDER — DESONIDE 0.5 MG/G
CREAM TOPICAL
Qty: 60 G | Refills: 2 | Status: SHIPPED | OUTPATIENT
Start: 2019-06-25 | End: 2019-11-04

## 2019-06-25 RX ORDER — ALBUTEROL SULFATE 90 UG/1
AEROSOL, METERED RESPIRATORY (INHALATION)
Status: CANCELLED | OUTPATIENT
Start: 2019-06-25

## 2019-06-25 RX ORDER — OMEPRAZOLE 40 MG/1
CAPSULE, DELAYED RELEASE ORAL
Qty: 90 CAPSULE | Refills: 3 | Status: SHIPPED | OUTPATIENT
Start: 2019-06-25 | End: 2019-11-04

## 2019-06-25 RX ORDER — ALBUTEROL SULFATE 90 UG/1
2 AEROSOL, METERED RESPIRATORY (INHALATION) EVERY 6 HOURS PRN
Qty: 2 INHALER | Refills: 1 | Status: SHIPPED | OUTPATIENT
Start: 2019-06-25 | End: 2019-10-29

## 2019-06-25 RX ORDER — LEVOCETIRIZINE DIHYDROCHLORIDE 5 MG/1
5 TABLET, FILM COATED ORAL EVERY EVENING
Qty: 90 TABLET | Refills: 3 | Status: SHIPPED | OUTPATIENT
Start: 2019-06-25 | End: 2019-11-04

## 2019-06-25 ASSESSMENT — PAIN SCALES - GENERAL: PAINLEVEL: NO PAIN (0)

## 2019-06-25 ASSESSMENT — MIFFLIN-ST. JEOR: SCORE: 1200.56

## 2019-06-25 NOTE — PATIENT INSTRUCTIONS
Continue Losartan 100 mg daily   Metoprolol 25 mg twice a day   Follow up in 1 month     Zpack   Prednisone 2 tablet once daily for 5 days   Mucinex DM 1 tablet twice a day for 10-14 days     Stay off pill for 1 month then follow up for blood work

## 2019-06-25 NOTE — PROGRESS NOTES
Subjective     Kylie Arellano is a 54 year old female who presents to clinic today for the following health issues:    HPI   Hypertension Follow-up      Do you check your blood pressure regularly outside of the clinic? Yes     Are you following a low salt diet? No but doesn't eat a lot of salt normally    Are your blood pressures ever more than 140 on the top number (systolic) OR more   than 90 on the bottom number (diastolic), for example 140/90? Yes  Asthma Follow-Up    Was ACT completed today?    Yes    ACT Total Scores 11/12/2018   ACT TOTAL SCORE -   ASTHMA ER VISITS -   ASTHMA HOSPITALIZATIONS -   ACT TOTAL SCORE (Goal Greater than or Equal to 20) 25   In the past 12 months, how many times did you visit the emergency room for your asthma without being admitted to the hospital? 0   In the past 12 months, how many times were you hospitalized overnight because of your asthma? 0       How many days per week do you miss taking your asthma controller medication?  0 if has mecation but currently out >1 month    Please describe any recent triggers for your asthma: upper respiratory infections, dust mites, pollens, animal dander, mold, humidity and occupational exposure    Have you had any Emergency Room Visits, Urgent Care Visits, or Hospital Admissions since your last office visit?  No        GERD/Heartburn      Duration: several years    Description (location/character/radiation): burning in chest    Intensity:  mild    Accompanying signs and symptoms:  food getting stuck: no   nausea/vomiting/blood: no   abdominal pain: no   black/tarry or bloody stools: no :    History (similar episodes/previous evaluation): treated with Omeprazole    Precipitating or alleviating factors:  worse with spicy foods, caffeinated drinks and alcohol.  current NSAID/Aspirin use: no     Therapies tried and outcome: Omeprazole (Prilosec)      Patient Active Problem List   Diagnosis     Moderate persistent asthma without complication      Seasonal allergic rhinitis     GERD (gastroesophageal reflux disease)     Acne     CARDIOVASCULAR SCREENING; LDL GOAL LESS THAN 160     Hypertension goal BP (blood pressure) < 140/90     S/P LEEP (status post loop electrosurgical excision procedure)- АЛЕКСАНДР 2/3     Atopic rhinitis     Recurrent cold sores     Shoulder impingement syndrome, right     Past Surgical History:   Procedure Laterality Date     BUNIONECTOMY RT/LT  1985    bilateral surgeries, two separate surgeries     CONIZATION LEEP  2010     SINUS SURGERY      multiple surgeries     SURGICAL HISTORY OF -   2003    Breast augmentation     TONSILLECTOMY  1982       Social History     Tobacco Use     Smoking status: Never Smoker     Smokeless tobacco: Never Used     Tobacco comment: no second hand smoke   Substance Use Topics     Alcohol use: Yes     Alcohol/week: 0.0 oz     Comment: occassional     Family History   Problem Relation Age of Onset     Connective Tissue Disorder Mother         autoimmune disorder that affects muscles     Cancer Maternal Grandfather      Diabetes Paternal Grandfather      Hypertension No family hx of      Cerebrovascular Disease No family hx of      Thyroid Disease No family hx of      Glaucoma No family hx of      Macular Degeneration No family hx of          Current Outpatient Medications   Medication Sig Dispense Refill     albuterol (PROAIR HFA/PROVENTIL HFA/VENTOLIN HFA) 108 (90 Base) MCG/ACT inhaler Inhale 2 puffs into the lungs every 6 hours as needed for shortness of breath / dyspnea or wheezing 2 Inhaler 1     desonide (DESOWEN) 0.05 % external cream Apply sparingly to Legs, up to twice daily as needed. 60 g 2     Dextromethorphan-Guaifenesin  MG TB12 Take 1 tablet by mouth 2 times daily 28 tablet 0     fluticasone (FLONASE) 50 MCG/ACT nasal spray Spray 2 sprays into both nostrils daily 16 g 3     ipratropium - albuterol 0.5 mg/2.5 mg/3 mL (DUONEB) 0.5-2.5 (3) MG/3ML neb solution Take 1 vial (3 mLs) by nebulization  "every 4 hours as needed for shortness of breath / dyspnea or wheezing 30 vial 3     levocetirizine (XYZAL) 5 MG tablet Take 1 tablet (5 mg) by mouth every evening 90 tablet 3     levonorgestrel-ethinyl estradiol (TRIVORA, 28,) per tablet One tablet continuously x 3 months, then off one week, repeat cycle x one year 112 tablet 4     losartan (COZAAR) 100 MG tablet Take 1 tablet (100 mg) by mouth daily Needs to be seen for more. 90 tablet 3     metoprolol succinate ER (TOPROL-XL) 25 MG 24 hr tablet Take 1 tablet (25 mg) by mouth 2 times daily 60 tablet 3     mometasone (ASMANEX 120 METERED DOSES) 220 MCG/INH inhaler Inhale 2 puffs into the lungs daily 1 Inhaler 5     montelukast (SINGULAIR) 10 MG tablet Take 1 tablet (10 mg) by mouth At Bedtime 90 tablet 3     Olopatadine HCl (PATADAY) 0.2 % SOLN Apply 1 drop to eye as needed. Each eye       omeprazole (PRILOSEC) 40 MG DR capsule Take 1 capsule (40 mg) by mouth daily 90 capsule 3     SEREVENT DISKUS 50 MCG/DOSE inhaler Inhale 1 puff into the lungs 2 times daily Needs to be seen for more. 60 each 4     valACYclovir (VALTREX) 500 MG tablet TAKE 1 TABLET BY MOUTH EVERY DAY AND INCREASE TO 2 TABLETS DAILY FOR 10 DAYS WITH OUTBREAKS 135 tablet 0     VENTOLIN  (90 BASE) MCG/ACT IN AERS as needed       albuterol (2.5 MG/3ML) 0.083% nebulizer solution Take 1 vial (2.5 mg) by nebulization once for 1 dose 3 mL 0     No Known Allergies    Reviewed and updated as needed this visit by Provider  Tobacco  Allergies  Meds  Problems  Med Hx  Surg Hx  Fam Hx         Review of Systems   ROS COMP: Constitutional, HEENT, cardiovascular, pulmonary, gi and gu systems are negative, except as otherwise noted.      Objective    BP (!) 173/108 (BP Location: Right arm, Patient Position: Chair, Cuff Size: Adult Regular)   Pulse 101   Temp 98.3  F (36.8  C) (Oral)   Resp 18   Ht 1.6 m (5' 3\")   Wt 62.1 kg (137 lb)   SpO2 98%   BMI 24.27 kg/m    Body mass index is 24.27 " kg/m .  Physical Exam   GENERAL: healthy, alert and no distress  NECK: no adenopathy, no asymmetry, masses, or scars and thyroid normal to palpation  RESP: lungs clear to auscultation - no rales, rhonchi or wheezes  CV: regular rate and rhythm, normal S1 S2, no S3 or S4, no murmur, click or rub, no peripheral edema and peripheral pulses strong  ABDOMEN: soft, nontender, no hepatosplenomegaly, no masses and bowel sounds normal  MS: no gross musculoskeletal defects noted, no edema    Diagnostic Test Results:  Labs reviewed in Epic  Results for orders placed or performed in visit on 11/12/18   Pap imaged thin layer screen with HPV - recommended age 30 - 65 years (select HPV order below)   Result Value Ref Range    PAP NIL     Copath Report         Patient Name: NAZANIN BABB  MR#: [id number]  Specimen #: [id number]  Collected: 11/12/2018  Received: 11/14/2018  Reported: 11/16/2018 10:13  Ordering Phy(s): FABRICIO LORA    For improved result formatting, select 'View Enhanced Report Format' under   Linked Documents section.    SPECIMEN/STAIN PROCESS:  Pap imaged thin layer prep screening (Surepath, FocalPoint with guided   screening)       Pap-Cyto x 1, HPV ordered x 1    SOURCE: Cervical, endocervical  ----------------------------------------------------------------   Pap imaged thin layer prep screening (Surepath, FocalPoint with guided   screening)  SPECIMEN ADEQUACY:  Satisfactory for evaluation.  -Transformation zone component present.    CYTOLOGIC INTERPRETATION:    Negative for intraepithelial lesion or malignancy    Electronically signed out by:  REMINGTON Talley (ASCP)    Processed and screened at [facility],   [facility]    CLINICAL HISTORY:    Currently not  having periods, Birth control, A previous normal pap  Date of Last Pap: 4/19/2017,    Papanicolaou Test Limitations:  Cervical cytology is a screening test with   limited sensitivity;  regular  screening is critical for cancer prevention; Pap tests are primarily   effective for the diagnosis/prevention of  squamous cell carcinoma, not adenocarcinomas or other cancers.    TESTING LAB LOCATION:  [address]  [phone]    COLLECTION SITE:  Client:  [facility]  Location: [facility] (B)     HPV High Risk Types DNA Cervical   Result Value Ref Range    HPV Source SurePath     HPV 16 DNA Negative NEG^Negative    HPV 18 DNA Negative NEG^Negative    Other HR HPV Positive (A) NEG^Negative    Final Diagnosis       This patient's sample is positive for other HR HPV DNA (types 31, 33, 35, 39, 45, 51, 52,   56, 58, 59, 66 or 68), not HPV 16 or HPV 18 DNA. This result requires clinical correlation   with concurrent cytology findings.      Specimen Description Cervical Cells    Wet prep   Result Value Ref Range    Specimen Description Vagina     Wet Prep No Trichomonas seen     Wet Prep No clue cells seen     Wet Prep No yeast seen    Chlamydia trachomatis PCR   Result Value Ref Range    Specimen Description Cervix     Chlamydia Trachomatis PCR Negative NEG^Negative   Neisseria gonorrhoeae PCR   Result Value Ref Range    Specimen Descrip Cervix     N Gonorrhea PCR Negative NEG^Negative           Assessment & Plan       ICD-10-CM    1. Moderate persistent asthma with acute exacerbation J45.41 azithromycin (ZITHROMAX) 250 MG tablet     predniSONE (DELTASONE) 20 MG tablet     Dextromethorphan-Guaifenesin  MG TB12   2. Hypertension goal BP (blood pressure) < 140/90 I10 losartan (COZAAR) 100 MG tablet     CANCELED: BASIC METABOLIC PANEL   3. Contraception Z78.9 OB/GYN REFERRAL     metoprolol succinate ER (TOPROL-XL) 25 MG 24 hr tablet     Follicle stimulating hormone   4. Intrinsic atopic dermatitis L20.84 desonide (DESOWEN) 0.05 % external cream   5. Gastroesophageal reflux disease without esophagitis K21.9  omeprazole (PRILOSEC) 40 MG DR capsule   6. Screen for colon cancer Z12.11 GASTROENTEROLOGY ADULT REF PROCEDURE ONLY   1. Zpack   Prednisone 2 tablet once daily for 5 days   Mucinex DM 1 tablet twice a day for 10-14 days   Follow up in 5 days or sooner as needed if not better   2. restart Losartan 100 mg daily   Start Metoprolol 25 mg twice a day   Follow up in 1 month   3. Patient is 54, with uncontrolled HTN   Advised patient to stop pill, follow up in 1 month to run FSH, most likely patient is in menopause and does not need oral contraception anymore.   4. Asymptomatic at this time. Refill was given   5. Stable continue Omeprazole 40 mg daily          No follow-ups on file.    Gloria White PA-C  [facility]

## 2019-06-25 NOTE — PROGRESS NOTES
"Subjective     Kylie Arellano is a 54 year old female who presents to clinic today for the following health issues:    HPI   {SUPERLIST (Optional):710632}  {additonal problems for provider to add (Optional):103830}    {HIST REVIEW/ LINKS 2 (Optional):903322}    Reviewed and updated as needed this visit by Provider         Review of Systems   {ROS COMP (Optional):314911}      Objective    There were no vitals taken for this visit.  There is no height or weight on file to calculate BMI.  Physical Exam   {Exam List (Optional):167023}    {Diagnostic Test Results (Optional):500131::\"Diagnostic Test Results:\",\"Labs reviewed in Epic\"}        {PROVIDER CHARTING PREFERENCE:608830}    "

## 2019-10-01 ENCOUNTER — HEALTH MAINTENANCE LETTER (OUTPATIENT)
Age: 54
End: 2019-10-01

## 2019-10-27 DIAGNOSIS — J45.40 MODERATE PERSISTENT ASTHMA WITHOUT COMPLICATION: ICD-10-CM

## 2019-10-27 NOTE — TELEPHONE ENCOUNTER
..Reason for call:  Medication   If this is a refill request, has the caller requested the refill from the pharmacy already? No  Will the patient be using a [facility]? No  Name of the pharmacy and phone number for the current request: [facility]  [phone]    Name of the medication requested:  montelukast (SINGULAIR) 10 MG tablet    mometasone (ASMANEX 120 METERED DOSES) 220 MCG/INH inhaler    SEREVENT DISKUS 50 MCG/DOSE inhaler      Other request: BLUE rescue inhaler    Phone number to reach patient:  Home number on file [phone] (home)    Best Time:  anytime    Can we leave a detailed message on this number?  YES

## 2019-10-28 NOTE — TELEPHONE ENCOUNTER
"Requested Prescriptions   Pending Prescriptions Disp Refills     SEREVENT DISKUS 50 MCG/DOSE inhaler  Last Written Prescription Date:  06/25/19  Last Fill Quantity: 60,  # refills: 4   Last Office Visit with [facility], [facility] or  Health prescribing provider:  06/25/19-Cindy   Future Office Visit:    Next 5 appointments (look out 90 days)    Nov 04, 2019 10:20 AM CST  [facility] [address] [place] [id number] [phone]        60 each 4     Sig: Inhale 1 puff into the lungs 2 times daily Needs to be seen for more.       Long-Acting Beta Agonist Inhalers Protocol  Failed - 10/28/2019  9:24 AM        Failed - Asthma control assessment score within normal limits in last 6 months     Please review ACT score.           Passed - Patient is age 12 or older        Passed - Order for Serevent, Striverdi, or Foradil and pt has steroid inhaler        Passed - Medication is active on med list        Passed - Recent (6 mo) or future (30 days) visit within the authorizing provider's specialty     Patient had office visit in the last 6 months or has a visit in the next 30 days with authorizing provider or within the authorizing provider's specialty.  See \"Patient Info\" tab in inbasket, or \"Choose Columns\" in Meds & Orders section of the refill encounter.            montelukast (SINGULAIR) 10 MG tablet  Last Written Prescription Date:  11/12/19  Last Fill Quantity: 90,  # refills: 3   Last Office Visit with [facility], [facility] or  Health prescribing provider:  06/25/19-Grady   Future Office Visit:    Next 5 appointments (look out 90 days)    Nov 04, 2019 10:20 AM CST  [facility] [address] [facility] [id number] [phone]        90 tablet 3     Sig: Take 1 tablet (10 mg) by mouth At " "Bedtime       Leukotriene Inhibitors Protocol Failed - 10/28/2019  9:24 AM        Failed - Asthma control assessment score within normal limits in last 6 months     Please review ACT score.           Passed - Patient is age 12 or older     If patient is under 16, ok to refill using age based dosing.           Passed - Medication is active on med list        Passed - Recent (6 mo) or future (30 days) visit within the authorizing provider's specialty     Patient had office visit in the last 6 months or has a visit in the next 30 days with authorizing provider or within the authorizing provider's specialty.  See \"Patient Info\" tab in inbasket, or \"Choose Columns\" in Meds & Orders section of the refill encounter.            mometasone (ASMANEX, 120 METERED DOSES,) 220 MCG/INH inhaler  Last Written Prescription Date:  06/25/19  Last Fill Quantity: 1,  # refills: 5   Last Office Visit with [facility], [facility] or [facility] prescribing provider:  06/25/19-Cindy   Future Office Visit:    Next 5 appointments (look out 90 days)    Nov 04, 2019 10:20 AM CST  [facility] Visit [facility]  [facility] [address] [place] [id number] [phone]        1 Inhaler 5     Sig: Inhale 2 puffs into the lungs daily       Inhaled Steroids Protocol Failed - 10/28/2019  9:24 AM        Failed - Asthma control assessment score within normal limits in last 6 months     Please review ACT score.           Passed - Patient is age 12 or older        Passed - Medication is active on med list        Passed - Recent (6 mo) or future (30 days) visit within the authorizing provider's specialty     Patient had office visit in the last 6 months or has a visit in the next 30 days with authorizing provider or within the authorizing provider's specialty.  See \"Patient Info\" tab in inbasket, or \"Choose Columns\" in Meds & Orders section of the refill encounter.            " "albuterol (PROAIR HFA/PROVENTIL HFA/VENTOLIN HFA) 108 (90 Base) MCG/ACT inhaler  Last Written Prescription Date:  06/25/19  Last Fill Quantity: 2,  # refills: 1   Last Office Visit with [facility] P or [facility] prescribing provider:  06/25/19   Future Office Visit:    Next 5 appointments (look out 90 days)    Nov 04, 2019 10:20 AM CST  Office Visit with Lynda Painting MD  [facility] ([facility]) [address] [place] [phone]        2 Inhaler 1     Sig: Inhale 2 puffs into the lungs every 6 hours as needed for shortness of breath / dyspnea or wheezing       Asthma Maintenance Inhalers - Anticholinergics Failed - 10/28/2019  9:24 AM        Failed - Asthma control assessment score within normal limits in last 6 months     Please review ACT score.           Passed - Patient is age 12 years or older        Passed - Medication is active on med list        Passed - Recent (6 mo) or future (30 days) visit within the authorizing provider's specialty     Patient had office visit in the last 6 months or has a visit in the next 30 days with authorizing provider or within the authorizing provider's specialty.  See \"Patient Info\" tab in inbasket, or \"Choose Columns\" in Meds & Orders section of the refill encounter.              "

## 2019-10-29 RX ORDER — ALBUTEROL SULFATE 90 UG/1
2 AEROSOL, METERED RESPIRATORY (INHALATION) EVERY 6 HOURS PRN
Qty: 1 INHALER | Refills: 0 | Status: SHIPPED | OUTPATIENT
Start: 2019-10-29 | End: 2020-09-09

## 2019-10-29 RX ORDER — MONTELUKAST SODIUM 10 MG/1
10 TABLET ORAL AT BEDTIME
Qty: 30 TABLET | Refills: 0 | Status: SHIPPED | OUTPATIENT
Start: 2019-10-29 | End: 2019-11-04

## 2019-10-29 NOTE — TELEPHONE ENCOUNTER
Routing refill request to provider for review/approval because:  BP Readings from Last 3 Encounters:   06/25/19 (!) 173/108   12/18/18 (!) 168/115   11/12/18 (!) 161/118   pt has upcoming appt scheduled.   Singular not on med list

## 2019-10-30 ENCOUNTER — HEALTH MAINTENANCE LETTER (OUTPATIENT)
Age: 54
End: 2019-10-30

## 2019-11-04 ENCOUNTER — OFFICE VISIT (OUTPATIENT)
Dept: FAMILY MEDICINE | Facility: CLINIC | Age: 54
End: 2019-11-04
Payer: COMMERCIAL

## 2019-11-04 VITALS
RESPIRATION RATE: 16 BRPM | SYSTOLIC BLOOD PRESSURE: 162 MMHG | BODY MASS INDEX: 22.68 KG/M2 | WEIGHT: 128 LBS | TEMPERATURE: 98.1 F | DIASTOLIC BLOOD PRESSURE: 110 MMHG | HEIGHT: 63 IN | OXYGEN SATURATION: 98 % | HEART RATE: 94 BPM

## 2019-11-04 DIAGNOSIS — J30.89 CHRONIC NON-SEASONAL ALLERGIC RHINITIS: ICD-10-CM

## 2019-11-04 DIAGNOSIS — I10 HYPERTENSION GOAL BP (BLOOD PRESSURE) < 140/90: Primary | ICD-10-CM

## 2019-11-04 DIAGNOSIS — K21.9 GASTROESOPHAGEAL REFLUX DISEASE WITHOUT ESOPHAGITIS: ICD-10-CM

## 2019-11-04 DIAGNOSIS — Z23 NEED FOR PROPHYLACTIC VACCINATION AND INOCULATION AGAINST INFLUENZA: ICD-10-CM

## 2019-11-04 DIAGNOSIS — M62.838 NECK MUSCLE SPASM: ICD-10-CM

## 2019-11-04 DIAGNOSIS — L20.84 INTRINSIC ATOPIC DERMATITIS: ICD-10-CM

## 2019-11-04 DIAGNOSIS — J45.40 MODERATE PERSISTENT ASTHMA WITHOUT COMPLICATION: ICD-10-CM

## 2019-11-04 DIAGNOSIS — J45.41 MODERATE PERSISTENT ASTHMA WITH ACUTE EXACERBATION: ICD-10-CM

## 2019-11-04 DIAGNOSIS — B00.1 RECURRENT COLD SORES: ICD-10-CM

## 2019-11-04 LAB
ALBUMIN SERPL-MCNC: 3.6 G/DL (ref 3.4–5)
ALP SERPL-CCNC: 147 U/L (ref 40–150)
ALT SERPL W P-5'-P-CCNC: 20 U/L (ref 0–50)
ANION GAP SERPL CALCULATED.3IONS-SCNC: 6 MMOL/L (ref 3–14)
AST SERPL W P-5'-P-CCNC: 25 U/L (ref 0–45)
BASOPHILS # BLD AUTO: 0 10E9/L (ref 0–0.2)
BASOPHILS NFR BLD AUTO: 0.6 %
BILIRUB SERPL-MCNC: 0.7 MG/DL (ref 0.2–1.3)
BUN SERPL-MCNC: 16 MG/DL (ref 7–30)
CALCIUM SERPL-MCNC: 8.9 MG/DL (ref 8.5–10.1)
CHLORIDE SERPL-SCNC: 102 MMOL/L (ref 94–109)
CO2 SERPL-SCNC: 31 MMOL/L (ref 20–32)
CREAT SERPL-MCNC: 0.8 MG/DL (ref 0.52–1.04)
DIFFERENTIAL METHOD BLD: NORMAL
EOSINOPHIL # BLD AUTO: 0.5 10E9/L (ref 0–0.7)
EOSINOPHIL NFR BLD AUTO: 8.5 %
ERYTHROCYTE [DISTWIDTH] IN BLOOD BY AUTOMATED COUNT: 13.9 % (ref 10–15)
GFR SERPL CREATININE-BSD FRML MDRD: 84 ML/MIN/{1.73_M2}
GLUCOSE SERPL-MCNC: 87 MG/DL (ref 70–99)
HCT VFR BLD AUTO: 41.8 % (ref 35–47)
HGB BLD-MCNC: 13.7 G/DL (ref 11.7–15.7)
LYMPHOCYTES # BLD AUTO: 1 10E9/L (ref 0.8–5.3)
LYMPHOCYTES NFR BLD AUTO: 14.9 %
MCH RBC QN AUTO: 29.5 PG (ref 26.5–33)
MCHC RBC AUTO-ENTMCNC: 32.8 G/DL (ref 31.5–36.5)
MCV RBC AUTO: 90 FL (ref 78–100)
MONOCYTES # BLD AUTO: 0.6 10E9/L (ref 0–1.3)
MONOCYTES NFR BLD AUTO: 10 %
NEUTROPHILS # BLD AUTO: 4.2 10E9/L (ref 1.6–8.3)
NEUTROPHILS NFR BLD AUTO: 66 %
PLATELET # BLD AUTO: 173 10E9/L (ref 150–450)
POTASSIUM SERPL-SCNC: 3.8 MMOL/L (ref 3.4–5.3)
PROT SERPL-MCNC: 6.8 G/DL (ref 6.8–8.8)
RBC # BLD AUTO: 4.64 10E12/L (ref 3.8–5.2)
SODIUM SERPL-SCNC: 139 MMOL/L (ref 133–144)
WBC # BLD AUTO: 6.4 10E9/L (ref 4–11)

## 2019-11-04 PROCEDURE — 80053 COMPREHEN METABOLIC PANEL: CPT | Performed by: FAMILY MEDICINE

## 2019-11-04 PROCEDURE — 85025 COMPLETE CBC W/AUTO DIFF WBC: CPT | Performed by: FAMILY MEDICINE

## 2019-11-04 PROCEDURE — 99214 OFFICE O/P EST MOD 30 MIN: CPT | Performed by: FAMILY MEDICINE

## 2019-11-04 PROCEDURE — 36415 COLL VENOUS BLD VENIPUNCTURE: CPT | Performed by: FAMILY MEDICINE

## 2019-11-04 RX ORDER — OLOPATADINE HYDROCHLORIDE 2 MG/ML
1 SOLUTION/ DROPS OPHTHALMIC DAILY
Qty: 2.5 ML | Refills: 11 | Status: SHIPPED | OUTPATIENT
Start: 2019-11-04

## 2019-11-04 RX ORDER — METHOCARBAMOL 500 MG/1
500 TABLET, FILM COATED ORAL 4 TIMES DAILY PRN
Qty: 60 TABLET | Refills: 0 | Status: SHIPPED | OUTPATIENT
Start: 2019-11-04 | End: 2020-09-09

## 2019-11-04 RX ORDER — PREDNISONE 20 MG/1
40 TABLET ORAL DAILY
Qty: 10 TABLET | Refills: 0 | Status: SHIPPED | OUTPATIENT
Start: 2019-11-04 | End: 2019-11-09

## 2019-11-04 RX ORDER — DESONIDE 0.5 MG/G
CREAM TOPICAL
Qty: 60 G | Refills: 2 | Status: SHIPPED | OUTPATIENT
Start: 2019-11-04 | End: 2023-12-21

## 2019-11-04 RX ORDER — FLUTICASONE PROPIONATE 50 MCG
2 SPRAY, SUSPENSION (ML) NASAL DAILY
Qty: 16 G | Refills: 3 | Status: SHIPPED | OUTPATIENT
Start: 2019-11-04 | End: 2021-01-25

## 2019-11-04 RX ORDER — ALBUTEROL SULFATE 0.83 MG/ML
2.5 SOLUTION RESPIRATORY (INHALATION) ONCE
Qty: 3 ML | Refills: 0 | Status: CANCELLED | OUTPATIENT
Start: 2019-11-04 | End: 2019-11-04

## 2019-11-04 RX ORDER — LEVOCETIRIZINE DIHYDROCHLORIDE 5 MG/1
10 TABLET, FILM COATED ORAL EVERY EVENING
Qty: 180 TABLET | Refills: 3 | Status: SHIPPED | OUTPATIENT
Start: 2019-11-04 | End: 2020-11-18

## 2019-11-04 RX ORDER — IPRATROPIUM BROMIDE AND ALBUTEROL SULFATE 2.5; .5 MG/3ML; MG/3ML
1 SOLUTION RESPIRATORY (INHALATION) EVERY 4 HOURS PRN
Qty: 30 VIAL | Refills: 3 | Status: SHIPPED | OUTPATIENT
Start: 2019-11-04 | End: 2023-08-16

## 2019-11-04 RX ORDER — ALBUTEROL SULFATE 90 UG/1
AEROSOL, METERED RESPIRATORY (INHALATION)
Qty: 18 G | Refills: 3 | Status: SHIPPED | OUTPATIENT
Start: 2019-11-04 | End: 2022-10-03

## 2019-11-04 RX ORDER — LOSARTAN POTASSIUM 100 MG/1
100 TABLET ORAL DAILY
Qty: 90 TABLET | Refills: 3 | Status: SHIPPED | OUTPATIENT
Start: 2019-11-04 | End: 2020-11-18

## 2019-11-04 RX ORDER — VALACYCLOVIR HYDROCHLORIDE 500 MG/1
TABLET, FILM COATED ORAL
Qty: 135 TABLET | Refills: 3 | Status: SHIPPED | OUTPATIENT
Start: 2019-11-04 | End: 2020-12-16

## 2019-11-04 RX ORDER — MONTELUKAST SODIUM 10 MG/1
10 TABLET ORAL AT BEDTIME
Qty: 90 TABLET | Refills: 3 | Status: SHIPPED | OUTPATIENT
Start: 2019-11-04 | End: 2020-11-18

## 2019-11-04 ASSESSMENT — MIFFLIN-ST. JEOR: SCORE: 1159.73

## 2019-11-04 ASSESSMENT — PAIN SCALES - GENERAL: PAINLEVEL: NO PAIN (0)

## 2019-11-04 NOTE — PATIENT INSTRUCTIONS
At [facility], we strive to deliver an exceptional experience to you, every time we see you.  If you receive a survey in the mail, please send us back your thoughts. We really do value your feedback.    Based on your medical history, these are the current health maintenance/preventive care services that you are due for (some may have been done at this visit.)  Health Maintenance Due   Topic Date Due     COLONOSCOPY  02/01/1977     EYE EXAM  11/14/2015     ZOSTER IMMUNIZATION (1 of 2) 02/01/2017     PREVENTIVE CARE VISIT  04/19/2018     BMP  03/12/2019     ASTHMA CONTROL TEST  05/12/2019     INFLUENZA VACCINE (1) 09/01/2019     CMP  09/12/2019     ASTHMA ACTION PLAN  09/12/2019         Suggested websites for health information:  Www.Neronote.O-CODES : Up to date and easily searchable information on multiple topics.  Www.DailyCred.gov : medication info, interactive tutorials, watch real surgeries online  Www.familydoctor.org : good info from the Academy of Family Physicians  Www.cdc.gov : public health info, travel advisories, epidemics (H1N1)  Www.aap.org : children's health info, normal development, vaccinations  Www.health.[facility].[place].us : [place] dept of health, public health issues in [place], N1N1    Your care team:                            Family Medicine Internal Medicine   MD Shimon Blevins MD Shantel Branch-Fleming, MD Katya Georgiev PA-C Nam Ho, MD Pediatrics   JEANNE Tay, MD Aminata Pham CNP, MD Deborah Mielke, MD Kim Thein, APRN CNP      Clinic hours: Monday - Thursday 7 am-7 pm; Fridays 7 am-5 pm.   Urgent care: Monday - Friday 11 am-9 pm; Saturday and Sunday 9 am-5 pm.  Pharmacy : Monday -Thursday 8 am-8 pm; Friday 8 am-6 pm; Saturday and Sunday 9 am-5 pm.     Clinic: [phone]   Pharmacy: [phone]

## 2019-11-04 NOTE — PROGRESS NOTES
Subjective     Kylie Arellano is a 54 year old female who presents to clinic today for the following health issues:    HPI   Hypertension Follow-up      Do you check your blood pressure regularly outside of the clinic? No     Are you following a low salt diet? No    Are your blood pressures ever more than 140 on the top number (systolic) OR more   than 90 on the bottom number (diastolic), for example 140/90? Yes    Asthma Follow-Up    Was ACT completed today?    Yes    Proair does not work well for her.  Ventolin is much better.  ACT Total Scores 11/12/2018   ACT TOTAL SCORE -   ASTHMA ER VISITS -   ASTHMA HOSPITALIZATIONS -   ACT TOTAL SCORE (Goal Greater than or Equal to 20) 25   In the past 12 months, how many times did you visit the emergency room for your asthma without being admitted to the hospital? 0   In the past 12 months, how many times were you hospitalized overnight because of your asthma? 0       How many days per week do you miss taking your asthma controller medication?  0    Please describe any recent triggers for your asthma: Environmental Stuff    Have you had any Emergency Room Visits, Urgent Care Visits, or Hospital Admissions since your last office visit?  No      How many servings of fruits and vegetables do you eat daily?  0-1    On average, how many sweetened beverages do you drink each day (soda, juice, sweet tea, etc)?   1    How many days per week do you miss taking your medication? 0    GERD - stable with omeprazole otc and would like rx.    Recurrent cold sores - stable with preventive medication. Has been working 13 hours days.    Dermatitis - worse in winter and but controlled with desonide.    Allergies - worse now and needs medication refills.  Usually does well without side effects.    Neck tightness for weeks. No injury but a lot of work stress.  Occasional tingling of hands but this has improved. No electrical shocks.      Reviewed and updated as needed this visit by  "Provider  Tobacco  Allergies  Meds  Problems  Med Hx  Surg Hx  Fam Hx         Review of Systems   ROS COMP: Constitutional, HEENT, cardiovascular, pulmonary, GI, , musculoskeletal, neuro, skin, endocrine and psych systems are negative, except as otherwise noted.      Objective    BP (!) 162/110 (BP Location: Right arm, Patient Position: Sitting, Cuff Size: Adult Regular)   Pulse 94   Temp 98.1  F (36.7  C) (Oral)   Resp 16   Ht 1.6 m (5' 3\")   Wt 58.1 kg (128 lb)   LMP  (LMP Unknown)   SpO2 98%   Breastfeeding? No   BMI 22.67 kg/m    Body mass index is 22.67 kg/m .  Physical Exam   GENERAL: healthy, alert and no distress  EYES: Eyes grossly normal to inspection, PERRL and conjunctivae and sclerae normal  NECK: no adenopathy, no asymmetry, masses, or scars and thyroid normal to palpation  RESP: expiratory wheezes throughout  CV: regular rate and rhythm, normal S1 S2, no S3 or S4, no murmur, click or rub, no peripheral edema and peripheral pulses strong  ABDOMEN: soft, nontender, no hepatosplenomegaly, no masses and bowel sounds normal  MS: reduced ROM left neck  NEURO: Normal strength and tone, mentation intact and speech normal  PSYCH: mentation appears normal, affect normal/bright    Diagnostic Test Results:  Labs reviewed in Epic        Assessment & Plan     1. Hypertension goal BP (blood pressure) < 140/90  Not controlled due to compliance - refill and check labs.  Discussed FMLA to make appointments.  - COMPREHENSIVE METABOLIC PANEL  - losartan (COZAAR) 100 MG tablet; Take 1 tablet (100 mg) by mouth daily  Dispense: 90 tablet; Refill: 3    2. Moderate persistent asthma without complication  Not controlled due to compliance - Refill medications  - ipratropium - albuterol 0.5 mg/2.5 mg/3 mL (DUONEB) 0.5-2.5 (3) MG/3ML neb solution; Take 1 vial (3 mLs) by nebulization every 4 hours as needed for shortness of breath / dyspnea or wheezing  Dispense: 30 vial; Refill: 3  - mometasone (ASMANEX, 120 " METERED DOSES,) 220 MCG/INH inhaler; Inhale 2 puffs into the lungs daily  Dispense: 3 Inhaler; Refill: 1  - montelukast (SINGULAIR) 10 MG tablet; Take 1 tablet (10 mg) by mouth At Bedtime  Dispense: 90 tablet; Refill: 3  - SEREVENT DISKUS 50 MCG/DOSE inhaler; Inhale 1 puff into the lungs 2 times daily Needs to be seen for more.  Dispense: 180 each; Refill: 1  - albuterol (VENTOLIN HFA) 108 (90 Base) MCG/ACT inhaler; Take 1 - 2 puffs every 4 hours as needed for cough/wheezing.  Dispense: 18 g; Refill: 3    3. Gastroesophageal reflux disease without esophagitis  Controlled - rx for 20 mg dose and discussed it may not be covered  - omeprazole (PRILOSEC) 20 MG DR capsule; Take 1 capsule (20 mg) by mouth daily Take 30 - 60 minutes before first meal of the day.  Dispense: 90 capsule; Refill: 3    4. Recurrent cold sores  Stable - refill and check labs.  - COMPREHENSIVE METABOLIC PANEL  - valACYclovir (VALTREX) 500 MG tablet; TAKE 1 TABLET BY MOUTH EVERY DAY AND INCREASE TO 2 TABLETS DAILY FOR 10 DAYS WITH OUTBREAKS  Dispense: 135 tablet; Refill: 3    5. Moderate persistent asthma with acute exacerbation  Not controlled - refill and prednisone burst today.  - ipratropium - albuterol 0.5 mg/2.5 mg/3 mL (DUONEB) 0.5-2.5 (3) MG/3ML neb solution; Take 1 vial (3 mLs) by nebulization every 4 hours as needed for shortness of breath / dyspnea or wheezing  Dispense: 30 vial; Refill: 3  - albuterol (VENTOLIN HFA) 108 (90 Base) MCG/ACT inhaler; Take 1 - 2 puffs every 4 hours as needed for cough/wheezing.  Dispense: 18 g; Refill: 3  - CBC with platelets differential    6. Intrinsic atopic dermatitis  Refilled  - desonide (DESOWEN) 0.05 % external cream; Apply sparingly to Legs, up to twice daily as needed.  Dispense: 60 g; Refill: 2  - predniSONE (DELTASONE) 20 MG tablet; Take 2 tablets (40 mg) by mouth daily for 5 days  Dispense: 10 tablet; Refill: 0    7. Chronic non-seasonal allergic rhinitis  Not controlled due to compliance -  refill   - fluticasone (FLONASE) 50 MCG/ACT nasal spray; Spray 2 sprays into both nostrils daily  Dispense: 16 g; Refill: 3  - olopatadine (PATADAY) 0.2 % ophthalmic solution; Place 1 drop into both eyes daily Each eye  Dispense: 2.5 mL; Refill: 11  - levocetirizine (XYZAL) 5 MG tablet; Take 2 tablets (10 mg) by mouth every evening  Dispense: 180 tablet; Refill: 3    8. Neck muscle spasm  Trial of methocarbamol and PT  - [facility], AND [facility] REFERRAL; Future  - methocarbamol (ROBAXIN) 500 MG tablet; Take 1 tablet (500 mg) by mouth 4 times daily as needed for muscle spasms  Dispense: 60 tablet; Refill: 0    9. Need for prophylactic vaccination and inoculation against influenza  Left prior to receiving.      The uses and side effects, including black box warnings as appropriate, were discussed in detail.  All patient questions were answered.  The patient was instructed to call immediately if any side effects developed.     Return in about 4 weeks (around 12/2/2019) for Ancillary Visit, BP Recheck.    Lynda Painting MD  [facility]

## 2019-11-05 NOTE — RESULT ENCOUNTER NOTE
Ms. Arellano,    All of your labs were normal for you.    Please contact the clinic if you have additional questions.  Thank you.    Sincerely,    Lynda Painting MD

## 2019-11-06 ENCOUNTER — THERAPY VISIT (OUTPATIENT)
Dept: PHYSICAL THERAPY | Facility: CLINIC | Age: 54
End: 2019-11-06
Attending: FAMILY MEDICINE
Payer: COMMERCIAL

## 2019-11-06 DIAGNOSIS — M62.838 NECK MUSCLE SPASM: ICD-10-CM

## 2019-11-06 DIAGNOSIS — M54.2 NECK PAIN: ICD-10-CM

## 2019-11-06 PROCEDURE — 97161 PT EVAL LOW COMPLEX 20 MIN: CPT | Mod: GP | Performed by: PHYSICAL THERAPIST

## 2019-11-06 PROCEDURE — 97140 MANUAL THERAPY 1/> REGIONS: CPT | Mod: GP | Performed by: PHYSICAL THERAPIST

## 2019-11-06 PROCEDURE — 97110 THERAPEUTIC EXERCISES: CPT | Mod: GP | Performed by: PHYSICAL THERAPIST

## 2019-11-06 NOTE — PATIENT INSTRUCTIONS
Dr.Branch Batista  Thank you for referring  Kylie Arellano to the [facility] for Physical Therapy.  I have attached a copy of her initial evaluation of her neck pain for you to review.  As I was speaking to Kylie she indicated that she is also having LBP and thought she mentioned it to you.  Would you be willing to add LBP to her PT order so that we can treat it.  Let me know if you would like to see her first instead of adding it to her current PT order.     Please message me or call me at [phone] if you have any questions.    Thank You again for referring to the [facility].  Negin Colin, MPT  [facility].
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
## 2019-11-13 ENCOUNTER — THERAPY VISIT (OUTPATIENT)
Dept: PHYSICAL THERAPY | Facility: CLINIC | Age: 54
End: 2019-11-13
Attending: FAMILY MEDICINE
Payer: COMMERCIAL

## 2019-11-13 DIAGNOSIS — M54.2 NECK PAIN: ICD-10-CM

## 2019-11-13 PROCEDURE — 97110 THERAPEUTIC EXERCISES: CPT | Mod: GP | Performed by: PHYSICAL THERAPIST

## 2019-11-13 PROCEDURE — 97140 MANUAL THERAPY 1/> REGIONS: CPT | Mod: GP | Performed by: PHYSICAL THERAPIST

## 2019-11-13 PROCEDURE — 97035 APP MDLTY 1+ULTRASOUND EA 15: CPT | Mod: GP | Performed by: PHYSICAL THERAPIST

## 2019-11-20 ENCOUNTER — THERAPY VISIT (OUTPATIENT)
Dept: PHYSICAL THERAPY | Facility: CLINIC | Age: 54
End: 2019-11-20
Attending: FAMILY MEDICINE
Payer: COMMERCIAL

## 2019-11-20 DIAGNOSIS — M54.2 NECK PAIN: ICD-10-CM

## 2019-11-20 PROCEDURE — 97140 MANUAL THERAPY 1/> REGIONS: CPT | Mod: GP | Performed by: PHYSICAL THERAPIST

## 2019-11-20 PROCEDURE — 97110 THERAPEUTIC EXERCISES: CPT | Mod: GP | Performed by: PHYSICAL THERAPIST

## 2019-11-20 PROCEDURE — 97035 APP MDLTY 1+ULTRASOUND EA 15: CPT | Mod: GP | Performed by: PHYSICAL THERAPIST

## 2019-12-03 ENCOUNTER — THERAPY VISIT (OUTPATIENT)
Dept: PHYSICAL THERAPY | Facility: CLINIC | Age: 54
End: 2019-12-03
Payer: COMMERCIAL

## 2019-12-03 DIAGNOSIS — M54.2 NECK PAIN: ICD-10-CM

## 2019-12-03 PROCEDURE — 97140 MANUAL THERAPY 1/> REGIONS: CPT | Mod: GP | Performed by: PHYSICAL THERAPIST

## 2019-12-03 PROCEDURE — 97035 APP MDLTY 1+ULTRASOUND EA 15: CPT | Mod: GP | Performed by: PHYSICAL THERAPIST

## 2019-12-15 ENCOUNTER — HEALTH MAINTENANCE LETTER (OUTPATIENT)
Age: 54
End: 2019-12-15

## 2019-12-17 ENCOUNTER — THERAPY VISIT (OUTPATIENT)
Dept: PHYSICAL THERAPY | Facility: CLINIC | Age: 54
End: 2019-12-17
Payer: COMMERCIAL

## 2019-12-17 DIAGNOSIS — M54.2 NECK PAIN: ICD-10-CM

## 2019-12-17 PROCEDURE — 97110 THERAPEUTIC EXERCISES: CPT | Mod: GP | Performed by: PHYSICAL THERAPIST

## 2019-12-17 PROCEDURE — 97140 MANUAL THERAPY 1/> REGIONS: CPT | Mod: GP | Performed by: PHYSICAL THERAPIST

## 2019-12-17 PROCEDURE — 97035 APP MDLTY 1+ULTRASOUND EA 15: CPT | Mod: GP | Performed by: PHYSICAL THERAPIST

## 2019-12-24 ENCOUNTER — THERAPY VISIT (OUTPATIENT)
Dept: PHYSICAL THERAPY | Facility: CLINIC | Age: 54
End: 2019-12-24
Payer: COMMERCIAL

## 2019-12-24 DIAGNOSIS — M54.2 NECK PAIN: ICD-10-CM

## 2019-12-24 PROCEDURE — 97140 MANUAL THERAPY 1/> REGIONS: CPT | Mod: GP | Performed by: PHYSICAL THERAPIST

## 2019-12-24 PROCEDURE — 97110 THERAPEUTIC EXERCISES: CPT | Mod: GP | Performed by: PHYSICAL THERAPIST

## 2019-12-24 PROCEDURE — 97035 APP MDLTY 1+ULTRASOUND EA 15: CPT | Mod: GP | Performed by: PHYSICAL THERAPIST

## 2020-01-07 ENCOUNTER — THERAPY VISIT (OUTPATIENT)
Dept: PHYSICAL THERAPY | Facility: CLINIC | Age: 55
End: 2020-01-07
Payer: COMMERCIAL

## 2020-01-07 DIAGNOSIS — M54.2 NECK PAIN: ICD-10-CM

## 2020-01-07 PROCEDURE — 97035 APP MDLTY 1+ULTRASOUND EA 15: CPT | Mod: GP | Performed by: PHYSICAL THERAPIST

## 2020-01-07 PROCEDURE — 97140 MANUAL THERAPY 1/> REGIONS: CPT | Mod: GP | Performed by: PHYSICAL THERAPIST

## 2020-01-07 NOTE — PATIENT INSTRUCTIONS
Dr.Branch Painting  Thank you for referring  Kylie to the [facility] for Physical Therapy.  I have attached a Current PT progress note for you to review. I would like to continue with PT for up to 4 more visits and per your original PT order it was [employer] so no other orders need to be placed at this time. nPlease message me or call me at [phone] if you have any questions.    Thank You again for referring to the [facility].  Negin Colin, MPT  [facility].

## 2020-01-07 NOTE — PROGRESS NOTES
Subjective:  HPI                    Objective:  System    Physical Exam    General     ROS    Assessment/Plan:    PROGRESS  REPORT    Progress reporting period as of 1/7/20.       SUBJECTIVE  Subjective changes noted by patient:  Patient seen 7 times out of 8 planned visits.  Patient presented to PT today with c/o mid thoracic pain (R side) and R side Sciatica.  pt is aware she does not have orders for R LB and will pursue if she continues to have pain.  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    Current pain level is : 1/10(R upper back; 7/10PL R lower back).        Changes in function:  Yes (See Goal flowsheet attached for changes in current functional level)  Adverse reaction to treatment or activity: None    OBJECTIVE  Changes noted in objective findings:  Yes, patient has been given illustrated HEP to follow.    CROM:  WNL; pulling R UT/Lev Scap with R ROT of Cerv spine.    R shoulder AROM is WNL.    Palaption:  tenderness R Rhomboid mm and R lumbar paraspinal/piriformis     ASSESSMENT/PLAN  Updated problem list and treatment plan: Diagnosis 1:  Neck/upper Back Pain  Pain -  hot/cold therapy, US and manual therapy  Decreased joint mobility - manual therapy and therapeutic exercise  Impaired muscle performance - neuro re-education  Decreased function - therapeutic activities  Impaired posture - neuro re-education  STG/LTGs have been met or progress has been made towards goals:  Yes (See Goal flow sheet completed today.)  Assessment of Progress: The patient's condition is improving.  The patient's condition has potential to improve.  Self Management Plans:  Patient has been instructed in a home treatment program.  I have re-evaluated this patient and find that the nature, scope, duration and intensity of the therapy is appropriate for the medical condition of the patient.  Kylie continues to require the following intervention to meet STG and LTG's:   PT    Recommendations:  This patient would benefit from continued therapy.     Frequency:  1 X week, once daily  Duration:  for 4 weeks      Patient to follow up with MD regarding R LB pain issues.    Please refer to the daily flowsheet for treatment today, total treatment time and time spent performing 1:1 timed codes.

## 2020-01-21 ENCOUNTER — E-VISIT (OUTPATIENT)
Dept: FAMILY MEDICINE | Facility: CLINIC | Age: 55
End: 2020-01-21
Payer: COMMERCIAL

## 2020-01-21 DIAGNOSIS — H92.09 OTALGIA, UNSPECIFIED LATERALITY: Primary | ICD-10-CM

## 2020-01-21 PROCEDURE — 99207 ZZC NO BILLABLE SERVICE THIS VISIT: CPT | Performed by: FAMILY MEDICINE

## 2020-01-23 ENCOUNTER — MYC MEDICAL ADVICE (OUTPATIENT)
Dept: FAMILY MEDICINE | Facility: CLINIC | Age: 55
End: 2020-01-23

## 2020-01-23 NOTE — TELEPHONE ENCOUNTER
Routing to the provider pool.  Rola Naranjo MA  [facility]  2nd Floor  Primary Care

## 2020-03-18 PROBLEM — M54.2 NECK PAIN: Status: RESOLVED | Noted: 2019-11-06 | Resolved: 2020-03-18

## 2020-04-28 ENCOUNTER — TELEPHONE (OUTPATIENT)
Dept: FAMILY MEDICINE | Facility: CLINIC | Age: 55
End: 2020-04-28

## 2020-04-28 NOTE — TELEPHONE ENCOUNTER
Panel Management Review   One phone call and send letter if unable to reach them or Baynote[employer] message and send letter if not read after 2 weeks (You will get a message to your inbasket)      BP Readings from Last 1 Encounters:   11/04/19 (!) 162/110        Health Maintenance Due   Topic Date Due     COLORECTAL CANCER SCREENING  [birth year]01/1977     EYE EXAM  11/14/2015     ZOSTER IMMUNIZATION (1 of 2) 02/01/2017     PREVENTIVE CARE VISIT  04/19/2018     HPV TEST  05/12/2019     ASTHMA CONTROL TEST  05/12/2019     PAP  05/12/2019     INFLUENZA VACCINE (1) 09/01/2019     ASTHMA ACTION PLAN  09/12/2019     PHQ-2  01/01/2020     BMP  05/04/2020        Fail List measure: BP check        Patient is due/failing the following:   BP CHECK    Action needed:   Patient needs nurse only appointment.    Type of outreach:    Phone, left message for patient to call back.  Please help patient schedule a BP check with our drive up service    Questions for provider review:    None                                                                                                                                   Elenita Wray

## 2020-07-23 NOTE — TELEPHONE ENCOUNTER
Refilled.  Olivia Longoria, WALESKA CNP (covering)   [No Acute Distress] : no acute distress [Well Nourished] : well nourished [Well-Appearing] : well-appearing [Normal Sclera/Conjunctiva] : normal sclera/conjunctiva [No Lymphadenopathy] : no lymphadenopathy [PERRL] : pupils equal round and reactive to light [No Respiratory Distress] : no respiratory distress  [Supple] : supple [No Accessory Muscle Use] : no accessory muscle use [Normal Rate] : normal rate  [Clear to Auscultation] : lungs were clear to auscultation bilaterally [Normal S1, S2] : normal S1 and S2 [Regular Rhythm] : with a regular rhythm [Soft] : abdomen soft [Non Tender] : non-tender [Normal Bowel Sounds] : normal bowel sounds [Normal Affect] : the affect was normal [Speech Grossly Normal] : speech grossly normal [Normal Mood] : the mood was normal

## 2020-07-25 ENCOUNTER — MYC MEDICAL ADVICE (OUTPATIENT)
Dept: FAMILY MEDICINE | Facility: CLINIC | Age: 55
End: 2020-07-25

## 2020-07-27 ENCOUNTER — VIRTUAL VISIT (OUTPATIENT)
Dept: FAMILY MEDICINE | Facility: OTHER | Age: 55
End: 2020-07-27

## 2020-07-27 NOTE — PROGRESS NOTES
"Date: 2020 10:15:41  Clinician: Get Fernandez  Clinician NPI: [id number]  Patient: Kylie Arellano  Patient : 1965  Patient Address: [address] [place] [address] [address], [place] [address]  Patient Phone: [phone]  Visit Protocol: URI  Patient Summary:  Kylie is a 55 year old ( : 1965 ) female who initiated a Visit for COVID-19 (Coronavirus) evaluation and screening. When asked the question \"Please sign me up to receive news, health information and promotions. \", Kylie responded \"No\".    Kylie states her symptoms started suddenly 3-4 days ago.   Her symptoms consist of wheezing, diarrhea, myalgia, a cough, nasal congestion, malaise, and ear pain. She is experiencing mild difficulty breathing with activities but can speak normally in full sentences.   Symptom details     Nasal secretions: The color of her mucus is white.    Cough: Kylie coughs a few times an hour and her cough is not more bothersome at night. Phlegm does not come into her throat when she coughs. She does not believe her cough is caused by post-nasal drip.     Wheezing: Kylie has been diagnosed with asthma. Additional wheezing details as reported by the patient (free text): I have asthma        Kylie denies having nausea, teeth pain, ageusia, sore throat, anosmia, facial pain or pressure, fever, vomiting, rhinitis, headache, chills, and enlarged lymph nodes. She also denies having recent facial or sinus surgery in the past 60 days, double sickening (worsening symptoms after initial improvement), and taking antibiotic medication in the past month.   Precipitating events  She has not recently been exposed to someone with influenza. Kylie has not been in close contact with any high risk individuals.   Pertinent COVID-19 (Coronavirus) information  In the past 14 days, Kylie has not worked in a congregate living setting.   She either works or volunteers as a healthcare worker or a , or works or volunteers in a healthcare facility. " She does not provide direct patient care. 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 She does not live with a healthcare worker.   Kylie has not had a close contact with a laboratory-confirmed COVID-19 patient within 14 days of symptom onset.   Pertinent medical history  Kylie does not get yeast infections when she takes antibiotics.   Kylie does not need a return to work/school note.   Weight: 135 lbs   Kylie does not smoke or use smokeless tobacco.   Additional information as reported by the patient (free text): Dizziness   Weight: 135 lbs    MEDICATIONS: Pataday ophthalmic (eye), valacyclovir oral, montelukast oral, omeprazole oral, levocetirizine oral, losartan-hydrochlorothiazide oral, fluticasone propionate nasal, Serevent Diskus inhalation, Asmanex Twisthaler inhalation, ALLERGIES: NKDA  Clinician Response:  Dear Kylie,   Your symptoms show that you may have coronavirus (COVID-19). This illness can cause fever, cough and trouble breathing. Many people get a mild case and get better on their own. Some people can get very sick.  What should I do?  We would like to test you for this virus.   1. Please call [phone] to schedule your visit. Explain that you were referred by [facility] to have a COVID-19 test. Be ready to share your [facility] visit ID number.  The following will serve as your written order for this COVID Test, ordered by me, for the indication of suspected COVID [Z20.828]: The test will be ordered in [employer], our electronic health record, after you are scheduled. It will show as ordered and authorized by Gianluca Paris MD.  Order: COVID-19 (Coronavirus) PCR for SYMPTOMATIC testing from [facility].      2. When it's time for your COVID test:  Stay at least 6 feet away from others. (If someone will drive you to your test, stay in the backseat, as far away from the  as you  "can.)   Cover your mouth and nose with a mask, tissue or washcloth.  Go straight to the testing site. Don't make any stops on the way there or back.      3.Starting now: Stay home and away from others (self-isolate) until:   You've had no fever---and no medicine that reduces fever---for 3 full days (72 hours). And...   Your other symptoms have gotten better. For example, your cough or breathing has improved. And...   At least 10 days have passed since your symptoms started.       During this time, don't leave the house except for testing or medical care.   Stay in your own room, even for meals. Use your own bathroom if you can.   Stay away from others in your home. No hugging, kissing or shaking hands. No visitors.  Don't go to work, school or anywhere else.    Clean \"high touch\" surfaces often (doorknobs, counters, handles, etc.). Use a household cleaning spray or wipes. You'll find a full list of  on the EPA website: www.epa.gov/pesticide-registration/list-n-disinfectants-use-against-sars-cov-2.   Cover your mouth and nose with a mask, tissue or washcloth to avoid spreading germs.  Wash your hands and face often. Use soap and water.  Caregivers in these groups are at risk for severe illness due to COVID-19:  o People 65 years and older  o People who live in a nursing home or long-term care facility  o People with chronic disease (lung, heart, cancer, diabetes, kidney, liver, immunologic)  o People who have a weakened immune system, including those who:   Are in cancer treatment  Take medicine that weakens the immune system, such as corticosteroids  Had a bone marrow or organ transplant  Have an immune deficiency  Have poorly controlled HIV or AIDS  Are obese (body mass index of 40 or higher)  Smoke regularly   o Caregivers should wear gloves while washing dishes, handling laundry and cleaning bedrooms and bathrooms.  o Use caution when washing and drying laundry: Don't shake dirty laundry, and use the " warmest water setting that you can.  o For more tips, go to www.cdc.gov/coronavirus/2019-ncov/downloads/10Things.pdf.    4.Sign up for [employer]. We know it's scary to hear that you might have COVID-19. We want to track your symptoms to make sure you're okay over the next 2 weeks. Please look for an email from [employer]---this is a free, online program that we'll use to keep in touch. To sign up, follow the link in the email. Learn more at [email or web address]  How can I take care of myself?   Get lots of rest. Drink extra fluids (unless a doctor has told you not to).   Take Tylenol (acetaminophen) for fever or pain. If you have liver or kidney problems, ask your family doctor if it's okay to take Tylenol.   Adults can take either:    650 mg (two 325 mg pills) every 4 to 6 hours, or...   1,000 mg (two 500 mg pills) every 8 hours as needed.    Note: Don't take more than 3,000 mg in one day. Acetaminophen is found in many medicines (both prescribed and over-the-counter medicines). Read all labels to be sure you don't take too much.   For children, check the Tylenol bottle for the right dose. The dose is based on the child's age or weight.    If you have other health problems (like cancer, heart failure, an organ transplant or severe kidney disease): Call your specialty clinic if you don't feel better in the next 2 days.       Know when to call 911. Emergency warning signs include:    Trouble breathing or shortness of breath Pain or pressure in the chest that doesn't go away Feeling confused like you haven't felt before, or not being able to wake up Bluish-colored lips or face.  Where can I get more information?    [facility] -- About COVID-19: www.[email or web address]/   CDC -- What to Do If You're Sick: www.cdc.gov/coronavirus/2019-ncov/about/steps-when-sick.html   CDC -- Ending Home Isolation: www.cdc.gov/coronavirus/2019-ncov/hcp/disposition-in-home-patients.html   CDC -- Caring for  Someone: www.cdc.gov/coronavirus/2019-ncov/if-you-are-sick/care-for-someone.html   [facility] -- Interim Guidance for Hospital Discharge to Home: www.health.[facility].[place]/diseases/coronavirus/hcp/hospdischarge.pdf   [facility] clinical trials (COVID-19 research studies): clinicalaffairs.[facility]-clinical-trials    Below are the COVID-19 hotlines at the [place] Department of Health ([facility]). Interpreters are available.    For health questions: Call 669-471-6532 or 1-369.419.1238 (7 a.m. to 7 p.m.) For questions about schools and childcare: Call 500-699-6484 or 1-623.403.5803 (7 a.m. to 7 p.m.)    Diagnosis: Nasal congestion  Diagnosis ICD: R09.81

## 2020-07-28 ENCOUNTER — MYC MEDICAL ADVICE (OUTPATIENT)
Dept: FAMILY MEDICINE | Facility: CLINIC | Age: 55
End: 2020-07-28

## 2020-07-30 DIAGNOSIS — Z20.822 SUSPECTED 2019 NOVEL CORONAVIRUS INFECTION: Primary | ICD-10-CM

## 2020-07-30 PROCEDURE — U0003 INFECTIOUS AGENT DETECTION BY NUCLEIC ACID (DNA OR RNA); SEVERE ACUTE RESPIRATORY SYNDROME CORONAVIRUS 2 (SARS-COV-2) (CORONAVIRUS DISEASE [COVID-19]), AMPLIFIED PROBE TECHNIQUE, MAKING USE OF HIGH THROUGHPUT TECHNOLOGIES AS DESCRIBED BY CMS-2020-01-R: HCPCS | Performed by: FAMILY MEDICINE

## 2020-07-31 ENCOUNTER — MYC MEDICAL ADVICE (OUTPATIENT)
Dept: FAMILY MEDICINE | Facility: CLINIC | Age: 55
End: 2020-07-31

## 2020-07-31 LAB
SARS-COV-2 RNA SPEC QL NAA+PROBE: NOT DETECTED
SPECIMEN SOURCE: NORMAL

## 2020-08-03 NOTE — TELEPHONE ENCOUNTER
See MyChart messages below.  Recommended visit for patient to have evaluation of right ear symptoms, ongoing for about 1 month now and causing dizziness.  In-person visit would be preferred, but all visit options given.    Imelda Correa RN  [facility]

## 2020-08-13 DIAGNOSIS — J45.40 MODERATE PERSISTENT ASTHMA WITHOUT COMPLICATION: ICD-10-CM

## 2020-08-15 ENCOUNTER — VIRTUAL VISIT (OUTPATIENT)
Dept: FAMILY MEDICINE | Facility: OTHER | Age: 55
End: 2020-08-15

## 2020-08-15 DIAGNOSIS — Z20.822 SUSPECTED COVID-19 VIRUS INFECTION: Primary | ICD-10-CM

## 2020-08-15 NOTE — PROGRESS NOTES
"Date: 08/15/2020 09:53:43  Clinician: Disha Cobian  Clinician NPI: [id number]  Patient: Kylie Arellano  Patient : 1965  Patient Address: [address] [place] [address] [place], [place] [address]  Patient Phone: [phone]  Visit Protocol: URI  Patient Summary:  Kylie is a 55 year old ( : 1965 ) female who initiated a Visit for COVID-19 (Coronavirus) evaluation and screening. When asked the question \"Please sign me up to receive news, health information and promotions. \", Kylie responded \"No\".    Kylie states her symptoms started gradually 5-6 days ago.   Her symptoms consist of a cough, nasal congestion, rhinitis, and wheezing. She is experiencing mild difficulty breathing with activities but can speak normally in full sentences.   Symptom details     Nasal secretions: The color of her mucus is yellow and clear.    Cough: Kylie coughs a few times an hour and her cough is not more bothersome at night. Phlegm comes into her throat when she coughs. She does not believe her cough is caused by post-nasal drip. The color of the phlegm is clear.     Wheezing: Kylie has been diagnosed with asthma. Additional wheezing details as reported by the patient (free text): I have asthma. My wheezing is from the tightness in my chest. My asthma meds do give me some temporary relief.        Kylie denies having ear pain, headache, chills, nausea, sore throat, teeth pain, ageusia, diarrhea, vomiting, myalgias, anosmia, facial pain or pressure, fever, and malaise. She also denies taking antibiotic medication in the past month, double sickening (worsening symptoms after initial improvement), and having recent facial or sinus surgery in the past 60 days.   Precipitating events  She has not recently been exposed to someone with influenza. Kylie has not been in close contact with any high risk individuals.   Pertinent COVID-19 (Coronavirus) information  In the past 14 days, Kylie has not worked in a congregate living setting.   She either " works or volunteers as a healthcare worker or a , or works or volunteers in a healthcare facility. She does not provide direct patient care. 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   Kylie also has not lived in a congregate living setting in the past 14 days. She does not live with a healthcare worker.   Kylie has not had a close contact with a laboratory-confirmed COVID-19 patient within 14 days of symptom onset.   Since December 2019, Kylie and has not had upper respiratory infection or influenza-like illness. Has not been diagnosed with lab-confirmed COVID-19 test   Pertinent medical history  Kylie does not get yeast infections when she takes antibiotics.   Kylie needs a return to work/school note.   Weight: 135 lbs   Kylie does not smoke or use smokeless tobacco.   Additional information as reported by the patient (free text): I tested negative two weeks ago. I was not coughing at that point.   Weight: 135 lbs    MEDICATIONS: Pataday ophthalmic (eye), valacyclovir oral, montelukast oral, omeprazole oral, levocetirizine oral, losartan-hydrochlorothiazide oral, fluticasone propionate nasal, Serevent Diskus inhalation, Asmanex Twisthaler inhalation, ALLERGIES: NKDA  Clinician Response:  Dear Kylie,         Your symptoms show that you may have coronavirus (COVID-19). This&nbsp;illness can cause fever, cough and trouble breathing. Many people get a mild case and get better on their own. Some people can get very sick.  What should I do?  We would like to test you for this virus.  1. Please call [phone] to schedule your visit. Explain that [facility] were [facility] have [facility] COVID-19 test. Be ready to share your [facility] visit ID number.  The following will serve as your written order for this COVID Test, ordered by me, for the indication of suspected COVID [Z20.828]: The test will be ordered in  "[employer], our electronic health record, after you are scheduled. It will show as ordered and authorized by Gianluca Paris MD.  Order: COVID-19 (Coronavirus) PCR for SYMPTOMATIC testing [facility].    2. When it's time for your COVID test:  Stay at least 6 feet away from others. (If someone will drive you to your test, stay in the backseat, as far away from the  as you can.)  Cover your mouth and nose with a mask, tissue or washcloth.  Go straight to the testing site. Don't make any stops on the way there or back.    3.Starting now:&nbsp;Stay home and away from others (self-isolate) until:   You've had&nbsp;no&nbsp;fever---and no medicine that reduces fever---for one full day (24 hours).&nbsp;And...  Your other symptoms have gotten better. For example, your cough or breathing has improved.&nbsp;And...  At least&nbsp;10 days&nbsp;have passed since your symptoms started.    During this time, don't leave the house except for testing or medical care.   Stay in your own room, even for meals. Use your own bathroom if you can.  Stay away from others in your home. No hugging, kissing or shaking hands. No visitors.  Don't go to work, school or anywhere else.   Clean \"high touch\" surfaces often (doorknobs, counters, handles, etc.). Use a household cleaning spray or wipes. You'll find a full list of  on the EPA website:&nbsp;www.epa.gov/pesticide-registration/list-n-disinfectants-use-against-sars-cov-2.   Cover your mouth and nose with a mask, tissue or washcloth to avoid spreading germs.  Wash your hands and face often. Use soap and water.  Caregivers in these groups are at risk for severe illness due to COVID-19:  o People 65 years and older  o People who live in a nursing home or long-term care facility  o People with chronic disease (lung, heart, cancer, diabetes, kidney, liver, immunologic)  o People who have a weakened immune system, including those who:   Are in cancer treatment  Take medicine that weakens the " immune system, such as corticosteroids  Had a bone marrow or organ transplant  Have an immune deficiency  Have poorly controlled HIV or AIDS  Are obese (body mass index of 40 or higher)  Smoke regularly   o Caregivers should wear gloves while washing dishes, handling laundry and cleaning bedrooms and bathrooms.  o Use caution when washing and drying laundry: Don't shake dirty laundry, and use the warmest water setting that you can.  o For more tips, go to&nbsp;www.cdc.gov/coronavirus/2019-ncov/downloads/10Things.pdf.   How can I take care of myself?    Get lots of rest. Drink extra fluids&nbsp;(unless a doctor has told you not to).  Take Tylenol (acetaminophen) for fever or pain.&nbsp;If you have liver or kidney problems, ask your family doctor if it's okay to take Tylenol.   Adults can take either:   650 mg (two 325 mg pills) every 4 to 6 hours,&nbsp;or...  1,000 mg (two 500 mg pills) every 8 hours as needed.  Note:&nbsp;Don't take more than 3,000 mg in one day. Acetaminophen is found in many medicines (both prescribed and over-the-counter medicines). Read all labels to be sure you don't take too much.   For children, check the Tylenol bottle for the right dose. The dose is based on the child's age or weight.   If you have other health problems (like cancer, heart failure, an organ transplant or severe kidney disease):&nbsp;Call your specialty clinic if you don't feel better in the next 2 days.    Know when to call 911.&nbsp;Emergency warning signs include:   Trouble breathing or shortness of breath Pain or pressure in the chest that doesn't go away Feeling confused like you haven't felt before, or not being able to wake up Bluish-colored lips or face.  Where can I get more information?   [facility] -- About COVID-19:&nbsp;[email or web address]  CDC -- What to Do If You're Sick:&nbsp;www.cdc.gov/coronavirus/2019-ncov/about/steps-when-sick.html  CDC -- Ending Home  Isolation:&nbsp;www.cdc.gov/coronavirus/2019-ncov/hcp/disposition-in-home-patients.html  [facility] -- Caring for Someone:&nbsp;www.cdc.gov/coronavirus/2019-ncov/if-you-are-sick/care-for-someone.html  [facility] -- Interim Guidance for Hospital Discharge to Home:&nbsp;www.[facility].[place]./diseases/coronavirus/hcp/hospdischarge.pdf  [facility] clinical trials (COVID-19 research studies):&nbsp;clinicalaffairs.[facility]/umn-clinical-trials  Below are the COVID-19 hotlines at the [place] Department of Health ([facility]). Interpreters are available.   For health questions: Call 778-875-2608 or 1-286.855.1110 (7 a.m. to 7 p.m.) For questions about schools and childcare: Call 346-057-1753 or 1-563.962.9636 (7 a.m. to 7 p.m.)           Diagnosis: Cough  Diagnosis ICD: R05

## 2020-08-16 DIAGNOSIS — Z20.822 SUSPECTED COVID-19 VIRUS INFECTION: ICD-10-CM

## 2020-08-16 PROCEDURE — U0003 INFECTIOUS AGENT DETECTION BY NUCLEIC ACID (DNA OR RNA); SEVERE ACUTE RESPIRATORY SYNDROME CORONAVIRUS 2 (SARS-COV-2) (CORONAVIRUS DISEASE [COVID-19]), AMPLIFIED PROBE TECHNIQUE, MAKING USE OF HIGH THROUGHPUT TECHNOLOGIES AS DESCRIBED BY CMS-2020-01-R: HCPCS | Performed by: FAMILY MEDICINE

## 2020-08-17 ENCOUNTER — TELEPHONE (OUTPATIENT)
Dept: NURSING | Facility: CLINIC | Age: 55
End: 2020-08-17

## 2020-08-17 LAB
SARS-COV-2 RNA SPEC QL NAA+PROBE: ABNORMAL
SPECIMEN SOURCE: ABNORMAL

## 2020-08-18 NOTE — TELEPHONE ENCOUNTER
"Coronavirus (COVID-19) Notification    Caller Name (Patient, parent, daughter/son, grandparent, etc)  Patient - Kylie Arellano    Reason for call  Notify of Positive Coronavirus (COVID-19) lab results, assess symptoms,  review  [facility] recommendations    Lab Result    Lab test:  2019-nCoV rRt-PCR or SARS-CoV-2 PCR    Oropharyngeal AND/OR nasopharyngeal swabs is POSITIVE for 2019-nCoV RNA/SARS-COV-2 PCR (COVID-19 virus)    RN Recommendations/Instructions per [facility] Coronavirus COVID-19 recommendations    Brief introduction script  Introduce self then review script:  \"I am calling on behalf of [employer].  We were notified that your Coronavirus test (COVID-19) for was POSITIVE for the virus.  I have some information to relay to you but first I wanted to mention that the [place] Dept of Health will be contacting you shortly [it's possible MD already called Patient] to talk to you more about how you are feeling and other people you have had contact with who might now also have the virus.  Also,  [email or web address] is Partnering with the [facility] for Covid-19 research, you may be contacted directly by research staff.\"    Assessment (Inquire about Patient's current symptoms)   Assessment   Current Symptoms at time of phone call: (if no symptoms, document No symptoms] Mild SOB, cough, nasal congestion, rhinitis, and wheezing; Rt ear congestion   Symptoms onset (if applicable) ~Sun, 8/9     If at time of call, Patients symptoms hare worsened, the Patient should contact 911 or have someone drive them to Emergency Dept promptly:      If Patient calling 911, inform 911 personal that you have tested positive for the Coronavirus (COVID-19).  Place mask on and await 911 to arrive.    If Emergency Dept, If possible, please have another adult drive you to the Emergency Dept but you need to wear mask when in contact with other people.      Review information with Patient    Your result was positive. This " means you have COVID-19 (coronavirus).  We have sent you a letter that reviews the information that I'll be reviewing with you now.    How can I protect others?    If you have symptoms: stay home and away from others (self-isolate) until:    You've had no fever--and no medicine that reduces fever--for 3 full days (72 hours). And      Your other symptoms have gotten better. For example, your cough or breathing has improved. And     At least 10 days have passed since your symptoms started.    If you don't have symptoms: Stay home and away from others (self-isolate) until at least 10 days have passed since your first positive COVID-19 test. (Date test collected)    During this time:    Stay in your own room, including for meals. Use your own bathroom if you can.    Stay away from others in your home. No hugging, kissing or shaking hands. No visitors.     Don't go to work, school or anywhere else.     Clean  high touch  surfaces often (doorknobs, counters, handles, etc.). Use a household cleaning spray or wipes. You'll find a full list on the EPA website at www.epa.gov/pesticide-registration/list-n-disinfectants-use-against-sars-cov-2.     Cover your mouth and nose with a mask, tissue or washcloth to avoid spreading germs.    Wash your hands and face often with soap and water.    Caregivers in these groups are at risk for severe illness due to COVID-19:  o People 65 years and older  o People who live in a nursing home or long-term care facility  o People with chronic disease (lung, heart, cancer, diabetes, kidney, liver, immunologic)  o People who have a weakened immune system, including those who:  - Are in cancer treatment  - Take medicine that weakens the immune system, such as corticosteroids  - Had a bone marrow or organ transplant  - Have an immune deficiency  - Have poorly controlled HIV or AIDS  - Are obese (body mass index of 40 or higher)  - Smoke regularly    Caregivers should wear gloves while washing  dishes, handling laundry and cleaning bedrooms and bathrooms.    Wash and dry laundry with special caution. Don't shake dirty laundry, and use the warmest water setting you can.    If you have a weakened immune system, ask your doctor about other actions you should take.    For more tips, go to www.cdc.gov/coronavirus/2019-ncov/downloads/10Things.pdf.    You should not go back to work until you meet the guidelines above for ending your home isolation. You should meet these along with any other guidelines that your employer has.    Employers: This document serves as formal notice of your employee's medical guidelines for going back to work. They must meet the above guidelines before going back to work in person.    How can I take care of myself?    1. Get lots of rest. Drink extra fluids (unless a doctor has told you not to).    2. Take Tylenol (acetaminophen) for fever or pain. If you have liver or kidney problems, ask your family doctor if it's okay to take Tylenol.     Take either:     650 mg (two 325 mg pills) every 4 to 6 hours, or     1,000 mg (two 500 mg pills) every 8 hours as needed.     Note: Don't take more than 3,000 mg in one day. Acetaminophen is found in many medicines (both prescribed and over-the-counter medicines). Read all labels to be sure you don't take too much.    For children, check the Tylenol bottle for the right dose (based on their age or weight).    3. If you have other health problems (like cancer, heart failure, an organ transplant or severe kidney disease): Call your specialty clinic if you don't feel better in the next 2 days.    4. Know when to call 911: Emergency warning signs include:    Trouble breathing or shortness of breath    Pain or pressure in the chest that doesn't go away    Feeling confused like you haven't felt before, or not being able to wake up    Bluish-colored lips or face    5. Sign up for [employer]. We know it's scary to hear that you have COVID-19. We want to  track your symptoms to make sure you're okay over the next 2 weeks. Please look for an email from [employer]--this is a free, online program that we'll use to keep in touch. To sign up, follow the link in the email. Learn more at [email or web address].    Where can I get more information?    [facility]: [email or web address]    Coronavirus Basics: www.[email or web address]./diseases/coronavirus/basics.html    What to Do If You're Sick: www.cdc.gov/coronavirus/2019-ncov/about/steps-when-sick.html    Ending Home Isolation: www.cdc.gov/coronavirus/2019-ncov/hcp/disposition-in-home-patients.html     Caring for Someone with COVID-19: www.cdc.gov/coronavirus/2019-ncov/if-you-are-sick/care-for-someone.html     [facility] clinical trials (COVID-19 research studies): [email or web address]-[email or web address]-trials     A Positive COVID-19 letter will be sent via "[employer] or the mail.    [Name]  Prudence Rodriguez RN  [facility]

## 2020-08-18 NOTE — TELEPHONE ENCOUNTER
This writer attempted to contact pt on 08/18/20      Reason for call schedule video visit and left message.  [facility] message sent    If patient calls back:   Schedule virtual appointment within 2 weeks with primary care, document that pt called and close encounter         Lalita Morel

## 2020-08-19 NOTE — TELEPHONE ENCOUNTER
Called and scheduled video visit for 9/9/2020. Patient needs refill to get to her appt.  Rola Naranjo [facility]  2nd Floor  Primary Care

## 2020-08-19 NOTE — TELEPHONE ENCOUNTER
Prescription approved per [facility] Refill Protocol.        Carrillo Silverman RN, BSN, PHN

## 2020-09-03 ENCOUNTER — MYC MEDICAL ADVICE (OUTPATIENT)
Dept: FAMILY MEDICINE | Facility: CLINIC | Age: 55
End: 2020-09-03

## 2020-09-03 NOTE — TELEPHONE ENCOUNTER
E-visit/Virtual visit/Telephone visit instructions given to Kylie to further discuss eye issues.     Carrillo Silverman RN, BSN, PHN

## 2020-09-09 ENCOUNTER — VIRTUAL VISIT (OUTPATIENT)
Dept: FAMILY MEDICINE | Facility: CLINIC | Age: 55
End: 2020-09-09
Payer: COMMERCIAL

## 2020-09-09 DIAGNOSIS — L70.0 ACNE VULGARIS: ICD-10-CM

## 2020-09-09 DIAGNOSIS — J45.40 MODERATE PERSISTENT ASTHMA WITHOUT COMPLICATION: ICD-10-CM

## 2020-09-09 DIAGNOSIS — I10 HYPERTENSION GOAL BP (BLOOD PRESSURE) < 140/90: Primary | ICD-10-CM

## 2020-09-09 PROCEDURE — 99214 OFFICE O/P EST MOD 30 MIN: CPT | Mod: 95 | Performed by: FAMILY MEDICINE

## 2020-09-09 RX ORDER — SULFACETAMIDE SODIUM 100 MG/ML
LOTION TOPICAL
Qty: 1 BOTTLE | Refills: 2 | Status: SHIPPED | OUTPATIENT
Start: 2020-09-09 | End: 2023-08-16

## 2020-09-09 NOTE — PATIENT INSTRUCTIONS
Please schedule your physical and think about what colon cancer screening test you would like.  Your labs and blood pressure reading is due. Please schedule a lab appointment and let me know what your home blood pressures are.

## 2020-09-09 NOTE — PROGRESS NOTES
"Kylie Arellano is a 55 year old female who is being evaluated via a billable video visit.      The patient has been notified of following:     \"This video visit will be conducted via a call between you and your physician/provider. We have found that certain health care needs can be provided without the need for an in-person physical exam.  This service lets us provide the care you need with a video conversation.  If a prescription is necessary we can send it directly to your pharmacy.  If lab work is needed we can place an order for that and you can then stop by our lab to have the test done at a later time.    Video visits are billed at different rates depending on your insurance coverage.  Please reach out to your insurance provider with any questions.    If during the course of the call the physician/provider feels a video visit is not appropriate, you will not be charged for this service.\"    Patient has given verbal consent for Video visit? Yes  How would you like to obtain your AVS? MyChart  If you are dropped from the video visit, the video invite should be resent to: Text to cell phone: see appt notes.  Will anyone else be joining your video visit? No    Subjective     Kylie Arellano is a 55 year old female who presents today via video visit for the following health issues:    HPI           Video Start Time: 5:35 pm    Acne - worse since menopause. Would like to restart cream had in the past.    Asthma - stable with current treatment. No side effects noted.  Did have covid but recovered well.    HTN - taking losartan but not metoprolol.  Is taking medication daily. Has home cuff but no recent checks.      Review of Systems   Constitutional, HEENT, cardiovascular, pulmonary, gi and gu systems are negative, except as otherwise noted.      Objective           Vitals:  No vitals were obtained today due to virtual visit.    Physical Exam     GENERAL: Healthy, alert and no distress  EYES: Eyes grossly normal to " inspection.  No discharge or erythema, or obvious scleral/conjunctival abnormalities.  RESP: No audible wheeze, cough, or visible cyanosis.  No visible retractions or increased work of breathing.    SKIN: Visible skin clear. No significant rash, abnormal pigmentation or lesions.  NEURO: Cranial nerves grossly intact.  Mentation and speech appropriate for age.  PSYCH: Mentation appears normal, affect normal/bright, judgement and insight intact, normal speech and appearance well-groomed.      No results found for this or any previous visit (from the past 24 hour(s)).        Assessment & Plan     Moderate persistent asthma without complication  Stable - refilled  - mometasone (ASMANEX, 120 METERED DOSES,) 220 MCG/INH inhaler; Inhale 2 puffs into the lungs daily  - SEREVENT DISKUS 50 MCG/DOSE inhaler; Inhale 1 puff into the lungs 2 times daily    Acne vulgaris  Worse - restart  - sulfacetamide sodium, Acne, (KLARON) 10 % lotion; Apply to acne lesions daily as needed.    Hypertension goal BP (blood pressure) < 140/90   Uncertain control - patient will check home bp and let me know what it is. Future labs placed.  - Basic metabolic panel; Future     Cancers screenings due.    The uses and side effects, including black box warnings as appropriate, were discussed in detail.  All patient questions were answered.  The patient was instructed to call immediately if any side effects developed.     Return in about 2 weeks (around 9/23/2020) for Annual Exam.    Lynda Painting MD  [facility]      Video-Visit Details    Type of service:  Video Visit    Video End Time:5:58 PM    Originating Location (pt. Location): Home    Distant Location (provider location):  [facility]     Platform used for Video Visit: [employer]

## 2020-10-15 ENCOUNTER — MYC MEDICAL ADVICE (OUTPATIENT)
Dept: FAMILY MEDICINE | Facility: CLINIC | Age: 55
End: 2020-10-15

## 2020-11-15 DIAGNOSIS — J45.40 MODERATE PERSISTENT ASTHMA WITHOUT COMPLICATION: ICD-10-CM

## 2020-11-15 DIAGNOSIS — I10 HYPERTENSION GOAL BP (BLOOD PRESSURE) < 140/90: ICD-10-CM

## 2020-11-15 DIAGNOSIS — J30.89 CHRONIC NON-SEASONAL ALLERGIC RHINITIS: ICD-10-CM

## 2020-11-15 DIAGNOSIS — K21.9 GASTROESOPHAGEAL REFLUX DISEASE WITHOUT ESOPHAGITIS: ICD-10-CM

## 2020-11-16 DIAGNOSIS — J45.40 MODERATE PERSISTENT ASTHMA WITHOUT COMPLICATION: ICD-10-CM

## 2020-11-18 RX ORDER — MONTELUKAST SODIUM 10 MG/1
10 TABLET ORAL AT BEDTIME
Qty: 90 TABLET | Refills: 0 | Status: SHIPPED | OUTPATIENT
Start: 2020-11-18 | End: 2021-02-18

## 2020-11-18 RX ORDER — LOSARTAN POTASSIUM 100 MG/1
100 TABLET ORAL DAILY
Qty: 90 TABLET | Refills: 0 | Status: SHIPPED | OUTPATIENT
Start: 2020-11-18 | End: 2021-02-18

## 2020-11-18 RX ORDER — LEVOCETIRIZINE DIHYDROCHLORIDE 5 MG/1
10 TABLET, FILM COATED ORAL EVERY EVENING
Qty: 180 TABLET | Refills: 0 | Status: SHIPPED | OUTPATIENT
Start: 2020-11-18 | End: 2021-02-18

## 2020-11-18 NOTE — TELEPHONE ENCOUNTER
Routing refill request to provider for review/approval because:  ACT fails protocol.    Adelaida Nevarez RN, [facility] Triage

## 2020-11-18 NOTE — TELEPHONE ENCOUNTER
Routing refill request to provider for review/approval because:  Labs not current:  Creatinine, potassium  BP and ACT fail protocol. Levocetirizine exceeds the daily recommended amount by 100%.    Adelaida Nevarez RN, [facility] Triage

## 2020-12-16 ENCOUNTER — MYC REFILL (OUTPATIENT)
Dept: FAMILY MEDICINE | Facility: CLINIC | Age: 55
End: 2020-12-16

## 2020-12-16 DIAGNOSIS — B00.1 RECURRENT COLD SORES: ICD-10-CM

## 2020-12-19 NOTE — TELEPHONE ENCOUNTER
Failed protocol.  Naina COTARN BSN  [facility]  [phone]    
waiting

## 2020-12-21 RX ORDER — VALACYCLOVIR HYDROCHLORIDE 500 MG/1
TABLET, FILM COATED ORAL
Qty: 135 TABLET | Refills: 3 | Status: SHIPPED | OUTPATIENT
Start: 2020-12-21 | End: 2022-04-14

## 2020-12-30 NOTE — NURSING NOTE
"Chief Complaint   Patient presents with     STD     Vaginal Problem     ODOR       Initial /78 (BP Location: Right arm, Patient Position: Chair, Cuff Size: Adult Regular)  Pulse 94  Temp 97.1  F (36.2  C) (Oral)  Wt 159 lb (72.1 kg)  SpO2 97%  Breastfeeding? No  BMI 27.75 kg/m2 Estimated body mass index is 27.75 kg/(m^2) as calculated from the following:    Height as of 4/19/17: 5' 3.47\" (1.612 m).    Weight as of this encounter: 159 lb (72.1 kg).  Medication Reconciliation: complete   An,CMA (AMAA)      " Your current Orthopaedic problem we are working together to treat is pain.    Medications that were prescribed for you today are:  Meloxicam 15mg- take one tablet daily as needed for pain. Take with food and water.   -PLEASE CONTACT THE OFFICE PRIOR TO STOPPING ANY MEDICATIONS  - Please allow 72 hours for all refill requests.  We will not refill any pain medicine after business hours or on weekends. Thank you!    Call Dr Trent - [phone]     It is recommended you schedule a follow-up appointment with Khoa Colbert MD, as needed.    Office hours are 8:00 am to 5:00 pm Monday through Friday. If it is urgent that you speak with someone outside of these hours, our [facility] Call Center will be able to assist you. You can reach the office by calling the [facility] at [phone] or [facility] at [phone].     We do highly recommend [facility], if you do not already have this. You can request access via the internet or by simply talking with a  at any of the clinics. [place]    Thank you for choosing [facility] as your Pain Management provider!    If you have any questions or concerns prior to your next office visit, please call our Pain Line: [phone].  They will take a message and send it to Dr Farfan's staff.  You will receive a response within 48 hours.

## 2021-01-15 ENCOUNTER — HEALTH MAINTENANCE LETTER (OUTPATIENT)
Age: 56
End: 2021-01-15

## 2021-01-23 DIAGNOSIS — J30.89 CHRONIC NON-SEASONAL ALLERGIC RHINITIS: ICD-10-CM

## 2021-01-25 RX ORDER — FLUTICASONE PROPIONATE 50 MCG
2 SPRAY, SUSPENSION (ML) NASAL DAILY
Qty: 16 G | Refills: 0 | Status: SHIPPED | OUTPATIENT
Start: 2021-01-25 | End: 2021-02-17

## 2021-02-17 DIAGNOSIS — I10 HYPERTENSION GOAL BP (BLOOD PRESSURE) < 140/90: ICD-10-CM

## 2021-02-17 DIAGNOSIS — K21.9 GASTROESOPHAGEAL REFLUX DISEASE WITHOUT ESOPHAGITIS: ICD-10-CM

## 2021-02-17 DIAGNOSIS — J45.40 MODERATE PERSISTENT ASTHMA WITHOUT COMPLICATION: ICD-10-CM

## 2021-02-17 DIAGNOSIS — J30.89 CHRONIC NON-SEASONAL ALLERGIC RHINITIS: ICD-10-CM

## 2021-02-17 RX ORDER — FLUTICASONE PROPIONATE 50 MCG
SPRAY, SUSPENSION (ML) NASAL
Qty: 16 G | Refills: 0 | Status: SHIPPED | OUTPATIENT
Start: 2021-02-17 | End: 2021-03-22

## 2021-02-17 NOTE — TELEPHONE ENCOUNTER
"Routing refill request to provider for review/approval because:  Failed protocol.  No future appointment scheduled. Please advise. Thank you. Paige Lewis R.N.    This refill/encounter is being handled by a team outside your facility.  If action needs to be taken, please route the encounter back to your team that would normally handle it. Thank you. Paige Lewis R.N.    Requested Prescriptions   Pending Prescriptions Disp Refills    levocetirizine (XYZAL) 5 MG tablet [Pharmacy Med Name: Levocetirizine Dihydrochloride Oral Tablet 5 MG] 180 tablet 0     Sig: Take 2 tablets (10 mg) by mouth every evening       Antihistamines Protocol Passed - 2/17/2021  2:23 PM        Passed - Patient is 3-64 years of age     Apply weight-based dosing for peds patients age 3 - 12 years of age.    Forward request to provider for patients under the age of 3 or over the age of 64.          Passed - Recent (12 mo) or future (30 days) visit within the authorizing provider's specialty     Patient has had an office visit with the authorizing provider or a provider within the authorizing providers department within the previous 12 mos or has a future within next 30 days. See \"Patient Info\" tab in inbasket, or \"Choose Columns\" in Meds & Orders section of the refill encounter.              Passed - Medication is active on med list          montelukast (SINGULAIR) 10 MG tablet [Pharmacy Med Name: Montelukast Sodium Oral Tablet 10 MG] 90 tablet 0     Sig: Take 1 tablet (10 mg) by mouth At Bedtime       Leukotriene Inhibitors Protocol Failed - 2/17/2021  2:23 PM        Failed - Asthma control assessment score within normal limits in last 6 months     Please review ACT score.           Passed - Patient is age 12 or older     If patient is under 16, ok to refill using age based dosing.           Passed - Medication is active on med list        Passed - Recent (6 mo) or future (30 days) visit within the authorizing provider's specialty     Patient had " "office visit in the last 6 months or has a visit in the next 30 days with authorizing provider or within the authorizing provider's specialty.  See \"Patient Info\" tab in inbasket, or \"Choose Columns\" in Meds & Orders section of the refill encounter.              SEREVENT DISKUS 50 MCG/DOSE inhaler [Pharmacy Med Name: Serevent Diskus Inhalation Aerosol Powder Breath Activated 50 MCG/DOSE] 180 each 0     Sig: Inhale 1 puff into the lungs 2 times daily. Needs to be seen for more.       Long-Acting Beta Agonist Inhalers Protocol  Failed - 2/17/2021  2:23 PM        Failed - Asthma control assessment score within normal limits in last 6 months     Please review ACT score.           Passed - Patient is age 12 or older        Passed - Medication is active on med list        Passed - Recent (6 mo) or future (30 days) visit within the authorizing provider's specialty     Patient had office visit in the last 6 months or has a visit in the next 30 days with authorizing provider or within the authorizing provider's specialty.  See \"Patient Info\" tab in in[employer] Systemset, or \"Choose Columns\" in Meds & Orders section of the refill encounter.              losartan (COZAAR) 100 MG tablet [Pharmacy Med Name: Losartan Potassium Oral Tablet 100 MG] 90 tablet 0     Sig: Take 1 tablet (100 mg) by mouth daily       Angiotensin-II Receptors Failed - 2/17/2021  2:23 PM        Failed - Last blood pressure under 140/90 in past 12 months     BP Readings from Last 3 Encounters:   11/04/19 (!) 162/110   06/25/19 (!) 173/108   12/18/18 (!) 168/115                 Failed - Normal serum creatinine on file in past 12 months     Recent Labs   Lab Test 11/04/19  1204   CR 0.80       Ok to refill medication if creatinine is low          Failed - Normal serum potassium on file in past 12 months     Recent Labs   Lab Test 11/04/19  1204   POTASSIUM 3.8                    Passed - Recent (12 mo) or future (30 days) visit within the authorizing provider's specialty " "    Patient has had an office visit with the authorizing provider or a provider within the authorizing providers department within the previous 12 mos or has a future within next 30 days. See \"Patient Info\" tab in inbasket, or \"Choose Columns\" in Meds & Orders section of the refill encounter.              Passed - Medication is active on med list        Passed - Patient is age 18 or older        Passed - No active pregnancy on record        Passed - No positive pregnancy test in past 12 months         Signed Prescriptions Disp Refills    fluticasone (FLONASE) 50 MCG/ACT nasal spray 16 g 0     Sig: Spray 2 sprays into both nostrils once daily.       Nasal Allergy Protocol Passed - 2/17/2021  2:23 PM        Passed - Patient is age 12 or older        Passed - Recent (12 mo) or future (30 days) visit within the authorizing provider's specialty     Patient has had an office visit with the authorizing provider or a provider within the authorizing providers department within the previous 12 mos or has a future within next 30 days. See \"Patient Info\" tab in inbasket, or \"Choose Columns\" in Meds & Orders section of the refill encounter.              Passed - Medication is active on med list          omeprazole (PRILOSEC) 20 MG DR capsule 30 capsule 0     Sig: TAKE 1 CAPSULE (20 MG) BY MOUTH DAILY TAKE 30 - 60 MINUTES BEFORE FIRST MEAL OF THE DAY.       PPI Protocol Passed - 2/17/2021  2:23 PM        Passed - Not on Clopidogrel (unless Pantoprazole ordered)        Passed - No diagnosis of osteoporosis on record        Passed - Recent (12 mo) or future (30 days) visit within the authorizing provider's specialty     Patient has had an office visit with the authorizing provider or a provider within the authorizing providers department within the previous 12 mos or has a future within next 30 days. See \"Patient Info\" tab in inbasket, or \"Choose Columns\" in Meds & Orders section of the refill encounter.              Passed - " Medication is active on med list        Passed - Patient is age 18 or older        Passed - No active pregnacy on record        Passed - No positive pregnancy test in past 12 months           Xyzal is over the recommended daily dose even though it passed protocol. Thank you.

## 2021-02-17 NOTE — TELEPHONE ENCOUNTER
Refilled x 1 month per protocol.  Patient needs to be seen for further refills. Thank you. Paige Lewis R.N.

## 2021-02-18 RX ORDER — MONTELUKAST SODIUM 10 MG/1
10 TABLET ORAL AT BEDTIME
Qty: 30 TABLET | Refills: 0 | Status: SHIPPED | OUTPATIENT
Start: 2021-02-18 | End: 2021-03-22

## 2021-02-18 RX ORDER — LEVOCETIRIZINE DIHYDROCHLORIDE 5 MG/1
10 TABLET, FILM COATED ORAL EVERY EVENING
Qty: 60 TABLET | Refills: 0 | Status: SHIPPED | OUTPATIENT
Start: 2021-02-18 | End: 2021-03-25

## 2021-02-18 RX ORDER — LOSARTAN POTASSIUM 100 MG/1
100 TABLET ORAL DAILY
Qty: 30 TABLET | Refills: 0 | Status: SHIPPED | OUTPATIENT
Start: 2021-02-18 | End: 2021-03-22

## 2021-03-02 ENCOUNTER — MYC REFILL (OUTPATIENT)
Dept: FAMILY MEDICINE | Facility: CLINIC | Age: 56
End: 2021-03-02

## 2021-03-02 DIAGNOSIS — J30.89 CHRONIC NON-SEASONAL ALLERGIC RHINITIS: ICD-10-CM

## 2021-03-02 RX ORDER — LEVOCETIRIZINE DIHYDROCHLORIDE 5 MG/1
10 TABLET, FILM COATED ORAL EVERY EVENING
Qty: 60 TABLET | Refills: 0 | OUTPATIENT
Start: 2021-03-02

## 2021-03-02 NOTE — TELEPHONE ENCOUNTER
Routing refill request to provider for review/approval because:  Dose.  Theresa Gonzalez RN

## 2021-03-03 RX ORDER — LEVOCETIRIZINE DIHYDROCHLORIDE 5 MG/1
10 TABLET, FILM COATED ORAL EVERY EVENING
Qty: 180 TABLET | Refills: 0 | OUTPATIENT
Start: 2021-03-03

## 2021-03-18 DIAGNOSIS — J30.89 CHRONIC NON-SEASONAL ALLERGIC RHINITIS: ICD-10-CM

## 2021-03-18 DIAGNOSIS — K21.9 GASTROESOPHAGEAL REFLUX DISEASE WITHOUT ESOPHAGITIS: ICD-10-CM

## 2021-03-18 DIAGNOSIS — I10 HYPERTENSION GOAL BP (BLOOD PRESSURE) < 140/90: ICD-10-CM

## 2021-03-18 DIAGNOSIS — J45.40 MODERATE PERSISTENT ASTHMA WITHOUT COMPLICATION: ICD-10-CM

## 2021-03-22 RX ORDER — LOSARTAN POTASSIUM 100 MG/1
100 TABLET ORAL DAILY
Qty: 30 TABLET | Refills: 0 | Status: SHIPPED | OUTPATIENT
Start: 2021-03-22 | End: 2021-04-07 | Stop reason: ALTCHOICE

## 2021-03-22 RX ORDER — MONTELUKAST SODIUM 10 MG/1
TABLET ORAL
Qty: 30 TABLET | Refills: 0 | Status: SHIPPED | OUTPATIENT
Start: 2021-03-22 | End: 2021-03-25

## 2021-03-22 RX ORDER — FLUTICASONE PROPIONATE 50 MCG
SPRAY, SUSPENSION (ML) NASAL
Qty: 16 G | Refills: 0 | Status: SHIPPED | OUTPATIENT
Start: 2021-03-22 | End: 2021-04-28

## 2021-03-24 DIAGNOSIS — J30.89 CHRONIC NON-SEASONAL ALLERGIC RHINITIS: ICD-10-CM

## 2021-03-24 DIAGNOSIS — K21.9 GASTROESOPHAGEAL REFLUX DISEASE WITHOUT ESOPHAGITIS: ICD-10-CM

## 2021-03-24 DIAGNOSIS — J45.40 MODERATE PERSISTENT ASTHMA WITHOUT COMPLICATION: ICD-10-CM

## 2021-03-25 RX ORDER — MONTELUKAST SODIUM 10 MG/1
TABLET ORAL
Qty: 30 TABLET | Refills: 0 | Status: SHIPPED | OUTPATIENT
Start: 2021-03-25 | End: 2021-04-29

## 2021-03-25 RX ORDER — LEVOCETIRIZINE DIHYDROCHLORIDE 5 MG/1
10 TABLET, FILM COATED ORAL EVERY EVENING
Qty: 60 TABLET | Refills: 0 | Status: SHIPPED | OUTPATIENT
Start: 2021-03-25 | End: 2021-07-12

## 2021-03-25 NOTE — TELEPHONE ENCOUNTER
There is not a current, normal ACT on file in the last 6 months.  RN unable to refill medication.    Shasha Mckeon BSN, RN

## 2021-03-31 ENCOUNTER — TRANSFERRED RECORDS (OUTPATIENT)
Dept: HEALTH INFORMATION MANAGEMENT | Facility: CLINIC | Age: 56
End: 2021-03-31

## 2021-04-04 ASSESSMENT — ENCOUNTER SYMPTOMS
NAUSEA: 1
BREAST MASS: 0
HEADACHES: 1
ARTHRALGIAS: 1
DIARRHEA: 1

## 2021-04-07 ENCOUNTER — OFFICE VISIT (OUTPATIENT)
Dept: FAMILY MEDICINE | Facility: CLINIC | Age: 56
End: 2021-04-07
Payer: COMMERCIAL

## 2021-04-07 VITALS
SYSTOLIC BLOOD PRESSURE: 136 MMHG | OXYGEN SATURATION: 98 % | TEMPERATURE: 97.9 F | HEART RATE: 83 BPM | BODY MASS INDEX: 26.8 KG/M2 | DIASTOLIC BLOOD PRESSURE: 96 MMHG | HEIGHT: 64 IN | WEIGHT: 157 LBS

## 2021-04-07 DIAGNOSIS — Z23 NEED FOR PROPHYLACTIC VACCINATION AND INOCULATION AGAINST INFLUENZA: ICD-10-CM

## 2021-04-07 DIAGNOSIS — Z23 NEED FOR SHINGLES VACCINE: ICD-10-CM

## 2021-04-07 DIAGNOSIS — Z12.4 SCREENING FOR MALIGNANT NEOPLASM OF CERVIX: ICD-10-CM

## 2021-04-07 DIAGNOSIS — I10 HYPERTENSION GOAL BP (BLOOD PRESSURE) < 140/90: ICD-10-CM

## 2021-04-07 DIAGNOSIS — Z00.00 ROUTINE GENERAL MEDICAL EXAMINATION AT A HEALTH CARE FACILITY: Primary | ICD-10-CM

## 2021-04-07 DIAGNOSIS — Z12.11 SCREEN FOR COLON CANCER: ICD-10-CM

## 2021-04-07 DIAGNOSIS — Z11.3 SCREEN FOR STD (SEXUALLY TRANSMITTED DISEASE): ICD-10-CM

## 2021-04-07 PROBLEM — E04.1 THYROID NODULE: Status: ACTIVE | Noted: 2021-04-07

## 2021-04-07 LAB
ALBUMIN SERPL-MCNC: 3.5 G/DL (ref 3.4–5)
ALP SERPL-CCNC: 154 U/L (ref 40–150)
ALT SERPL W P-5'-P-CCNC: 25 U/L (ref 0–50)
ANION GAP SERPL CALCULATED.3IONS-SCNC: 4 MMOL/L (ref 3–14)
AST SERPL W P-5'-P-CCNC: 19 U/L (ref 0–45)
BILIRUB SERPL-MCNC: 0.4 MG/DL (ref 0.2–1.3)
BUN SERPL-MCNC: 18 MG/DL (ref 7–30)
CALCIUM SERPL-MCNC: 8.8 MG/DL (ref 8.5–10.1)
CHLORIDE SERPL-SCNC: 109 MMOL/L (ref 94–109)
CO2 SERPL-SCNC: 26 MMOL/L (ref 20–32)
CREAT SERPL-MCNC: 0.94 MG/DL (ref 0.52–1.04)
GFR SERPL CREATININE-BSD FRML MDRD: 69 ML/MIN/{1.73_M2}
GLUCOSE SERPL-MCNC: 113 MG/DL (ref 70–99)
POTASSIUM SERPL-SCNC: 3.9 MMOL/L (ref 3.4–5.3)
PROT SERPL-MCNC: 6.9 G/DL (ref 6.8–8.8)
SODIUM SERPL-SCNC: 139 MMOL/L (ref 133–144)

## 2021-04-07 PROCEDURE — 99213 OFFICE O/P EST LOW 20 MIN: CPT | Mod: 25 | Performed by: FAMILY MEDICINE

## 2021-04-07 PROCEDURE — 87491 CHLMYD TRACH DNA AMP PROBE: CPT | Performed by: FAMILY MEDICINE

## 2021-04-07 PROCEDURE — G0145 SCR C/V CYTO,THINLAYER,RESCR: HCPCS | Performed by: FAMILY MEDICINE

## 2021-04-07 PROCEDURE — 90472 IMMUNIZATION ADMIN EACH ADD: CPT | Performed by: FAMILY MEDICINE

## 2021-04-07 PROCEDURE — 99396 PREV VISIT EST AGE 40-64: CPT | Mod: 25 | Performed by: FAMILY MEDICINE

## 2021-04-07 PROCEDURE — 80053 COMPREHEN METABOLIC PANEL: CPT | Performed by: FAMILY MEDICINE

## 2021-04-07 PROCEDURE — 90471 IMMUNIZATION ADMIN: CPT | Performed by: FAMILY MEDICINE

## 2021-04-07 PROCEDURE — G0124 SCREEN C/V THIN LAYER BY MD: HCPCS | Performed by: PATHOLOGY

## 2021-04-07 PROCEDURE — 87624 HPV HI-RISK TYP POOLED RSLT: CPT | Performed by: FAMILY MEDICINE

## 2021-04-07 PROCEDURE — 87340 HEPATITIS B SURFACE AG IA: CPT | Performed by: FAMILY MEDICINE

## 2021-04-07 PROCEDURE — 99000 SPECIMEN HANDLING OFFICE-LAB: CPT | Performed by: FAMILY MEDICINE

## 2021-04-07 PROCEDURE — 90750 HZV VACC RECOMBINANT IM: CPT | Performed by: FAMILY MEDICINE

## 2021-04-07 PROCEDURE — 90682 RIV4 VACC RECOMBINANT DNA IM: CPT | Performed by: FAMILY MEDICINE

## 2021-04-07 PROCEDURE — 87591 N.GONORRHOEAE DNA AMP PROB: CPT | Performed by: FAMILY MEDICINE

## 2021-04-07 PROCEDURE — 36415 COLL VENOUS BLD VENIPUNCTURE: CPT | Performed by: FAMILY MEDICINE

## 2021-04-07 PROCEDURE — 86780 TREPONEMA PALLIDUM: CPT | Mod: 90 | Performed by: FAMILY MEDICINE

## 2021-04-07 PROCEDURE — 87389 HIV-1 AG W/HIV-1&-2 AB AG IA: CPT | Performed by: FAMILY MEDICINE

## 2021-04-07 PROCEDURE — 86803 HEPATITIS C AB TEST: CPT | Performed by: FAMILY MEDICINE

## 2021-04-07 RX ORDER — CIPROFLOXACIN AND DEXAMETHASONE 3; 1 MG/ML; MG/ML
SUSPENSION/ DROPS AURICULAR (OTIC)
COMMUNITY
Start: 2021-04-03 | End: 2021-07-20

## 2021-04-07 RX ORDER — AMLODIPINE AND VALSARTAN 5; 320 MG/1; MG/1
1 TABLET ORAL DAILY
Qty: 90 TABLET | Refills: 0 | Status: SHIPPED | OUTPATIENT
Start: 2021-04-07 | End: 2021-07-12

## 2021-04-07 ASSESSMENT — ENCOUNTER SYMPTOMS
DIARRHEA: 1
HEADACHES: 1
ARTHRALGIAS: 1
BREAST MASS: 0
NAUSEA: 1

## 2021-04-07 ASSESSMENT — MIFFLIN-ST. JEOR: SCORE: 1289.21

## 2021-04-07 ASSESSMENT — PAIN SCALES - GENERAL: PAINLEVEL: NO PAIN (0)

## 2021-04-07 NOTE — PATIENT INSTRUCTIONS
At [facility], we strive to deliver an exceptional experience to you, every time we see you. If you receive a survey, please complete it as we do value your feedback.  If you have MyChart, you can expect to receive results automatically within 24 hours of their completion.  Your provider will send a note interpreting your results as well.   If you do not have MyChart, you should receive your results in about a week by mail.    Your care team:                            Family Medicine Internal Medicine   MD Shimon Blevins MD Shantel Branch-Fleming, MD Srinivasa Vaka, MD Katya Belousova, PASanthoshC  Tamra Stone, APRN CNP    Marquis Reich, MD Pediatrics   Ray Mcgarry, PAMICHELLE Longoria, CNP MD Janice Rosenberg APRN CNP   MD Aminata Locke MD Deborah Mielke, MD Kim Thein, APRN [facility]      Clinic hours: Monday - Thursday 7 am-6 pm; Fridays 7 am-5 pm.   Urgent care: Monday - Friday 11 am-9 pm; Saturday and Sunday 9 am-5 pm.    Clinic: [phone]       [facility]: Monday - Thursday 8 am - 7 pm; Friday 8 am - 6 pm  [facility]: [phone]     Use www.[email or web address] for 24/7 diagnosis and treatment of dozens of conditions.    Preventive Health Recommendations  Female Ages 50 - 64    Yearly exam: See your health care provider every year in order to  o Review health changes.   o Discuss preventive care.    o Review your medicines if your doctor has prescribed any.      Get a Pap test every three years (unless you have an abnormal result and your provider advises testing more often).    If you get Pap tests with HPV test, you only need to test every 5 years, unless you have an abnormal result.     You do not need a Pap test if your uterus was removed (hysterectomy) and you have not had cancer.    You should be tested each year for STDs (sexually transmitted diseases) if you're at  risk.     Have a mammogram every 1 to 2 years.    Have a colonoscopy at age 50, or have a yearly FIT test (stool test). These exams screen for colon cancer.      Have a cholesterol test every 5 years, or more often if advised.    Have a diabetes test (fasting glucose) every three years. If you are at risk for diabetes, you should have this test more often.     If you are at risk for osteoporosis (brittle bone disease), think about having a bone density scan (DEXA).    Shots: Get a flu shot each year. Get a tetanus shot every 10 years.    Nutrition:     Eat at least 5 servings of fruits and vegetables each day.    Eat whole-grain bread, whole-wheat pasta and brown rice instead of white grains and rice.    Get adequate Calcium and Vitamin D.     Lifestyle    Exercise at least 150 minutes a week (30 minutes a day, 5 days a week). This will help you control your weight and prevent disease.    Limit alcohol to one drink per day.    No smoking.     Wear sunscreen to prevent skin cancer.     See your dentist every six months for an exam and cleaning.    See your eye doctor every 1 to 2 years.

## 2021-04-07 NOTE — PROGRESS NOTES
SUBJECTIVE:   CC: Kylie Arellano is an 56 year old woman who presents for preventive health visit.       Patient has been advised of split billing requirements and indicates understanding: Yes  Healthy Habits:     Getting at least 3 servings of Calcium per day:  NO    Bi-annual eye exam:  Yes    Dental care twice a year:  NO    Sleep apnea or symptoms of sleep apnea:  Daytime drowsiness and Excessive snoring    Diet:  Regular (no restrictions)    Frequency of exercise:  None    Taking medications regularly:  Yes    Medication side effects:  None    PHQ-2 Total Score: 0    Additional concerns today:  Yes        HTN - taking medication as directed and consistently now.  Stress  - much less now as previous boss was fired and work load is more manageable.  Recent vacation to [place] also.    Today's PHQ-2 Score:   PHQ-2 ( 1999 Pf[employer]) 4/4/2021   Q1: Little interest or pleasure in doing things 0   Q2: Feeling down, depressed or hopeless 0   PHQ-2 Score 0   Q1: Little interest or pleasure in doing things Not at all   Q2: Feeling down, depressed or hopeless Not at all   PHQ-2 Score 0       Abuse: Current or Past (Physical, Sexual or Emotional) - No  Do you feel safe in your environment? Yes    Have you ever done Advance Care Planning? (For example, a Health Directive, POLST, or a discussion with a medical provider or your loved ones about your wishes): No, advance care planning information given to patient to review.  Patient plans to discuss their wishes with loved ones or provider.      Social History     Tobacco Use     Smoking status: Never Smoker     Smokeless tobacco: Never Used     Tobacco comment: no second hand smoke   Substance Use Topics     Alcohol use: Yes     Alcohol/week: 0.0 standard drinks     Comment: occassional     If you drink alcohol do you typically have >3 drinks per day or >7 drinks per week? No    Alcohol Use 4/4/2021   Prescreen: >3 drinks/day or >7 drinks/week? Yes   Prescreen: >3  drinks/day or >7 drinks/week? -   AUDIT SCORE  2     AUDIT - Alcohol Use Disorders Identification Test - Reproduced from the World Health Organization Audit 2001 (Second Edition) 4/4/2021   1.  How often do you have a drink containing alcohol? 2 to 4 times a month   2.  How many drinks containing alcohol do you have on a typical day when you are drinking? 1 or 2   3.  How often do you have five or more drinks on one occasion? Never   4.  How often during the last year have you found that you were not able to stop drinking once you had started? Never   5.  How often during the last year have you failed to do what was normally expected of you because of drinking? Never   6.  How often during the last year have you needed a first drink in the morning to get yourself going after a heavy drinking session? Never   7.  How often during the last year have you had a feeling of guilt or remorse after drinking? Never   8.  How often during the last year have you been unable to remember what happened the night before because of your drinking? Never   9.  Have you or someone else been injured because of your drinking? No   10. Has a relative, friend, doctor or other health care worker been concerned about your drinking or suggested you cut down? No   TOTAL SCORE 2       Reviewed orders with patient.  Reviewed health maintenance and updated orders accordingly - Yes  Labs reviewed in EPIC    Breast Cancer Screening:    Breast CA Risk Assessment (FHS-7) 4/4/2021   Do you have a family history of breast, colon, or ovarian cancer? No / Unknown         Mammogram Screening: Recommended annual mammography  Pertinent mammograms are reviewed under the imaging tab.    History of abnormal Pap smear: YES - updated in Problem List and Health Maintenance accordingly  PAP / HPV Latest Ref Rng & Units 11/12/2018 4/19/2017 3/7/2016   PAP - NIL NIL NIL   HPV 16 DNA NEG:Negative Negative Negative Negative   HPV 18 DNA NEG:Negative Negative Negative  "Negative   OTHER HR HPV NEG:Negative Positive(A) Positive(A) Positive(A)     Reviewed and updated as needed this visit by clinical staff  Tobacco  Allergies  Meds  Problems  Med Hx  Surg Hx  Fam Hx  Soc Hx          Reviewed and updated as needed this visit by Provider  Tobacco  Allergies  Meds  Problems  Med Hx  Surg Hx  Fam Hx           Review of Systems   HENT: Positive for ear pain and hearing loss.    Gastrointestinal: Positive for diarrhea and nausea.   Breasts:  Negative for tenderness, breast mass and discharge.   Genitourinary: Negative for pelvic pain, vaginal bleeding and vaginal discharge.   Musculoskeletal: Positive for arthralgias.   Neurological: Positive for headaches.     10 point ROS of systems including Constitutional, Eyes, Respiratory, Cardiovascular, Gastroenterology, Genitourinary, Integumentary, Muscularskeletal, Psychiatric were all negative except for pertinent positives noted in my HPI.      OBJECTIVE:   BP (!) 136/96 (BP Location: Left arm, Patient Position: Sitting, Cuff Size: Adult Regular)   Pulse 83   Temp 97.9  F (36.6  C) (Oral)   Ht 1.613 m (5' 3.5\")   Wt 71.2 kg (157 lb)   LMP  (LMP Unknown)   SpO2 98%   BMI 27.38 kg/m    Physical Exam  GENERAL: healthy, alert and no distress  EYES: Eyes grossly normal to inspection, PERRL and conjunctivae and sclerae normal  HENT: ear canals and TM's normal, nose and mouth without ulcers or lesions  NECK: no adenopathy, no asymmetry, masses, or scars and thyroid normal to palpation  RESP: lungs clear to auscultation - no rales, rhonchi or wheezes  BREAST: normal without masses, tenderness or nipple discharge, no palpable axillary masses or adenopathy and implant bilateral  CV: regular rate and rhythm, normal S1 S2, no S3 or S4, no murmur, click or rub, no peripheral edema and peripheral pulses strong  ABDOMEN: soft, nontender, no hepatosplenomegaly, no masses and bowel sounds normal   (female): normal female external " genitalia, normal urethral meatus , vaginal mucosal atrophy and normal cervix, adnexae, and uterus without masses.  MS: no gross musculoskeletal defects noted, no edema  SKIN: no suspicious lesions or rashes  NEURO: Normal strength and tone, mentation intact and speech normal  PSYCH: mentation appears normal, affect normal/bright    Diagnostic Test Results:  Labs reviewed in Epic    ASSESSMENT/PLAN:   1. Routine general medical examination at a health care facility  Routine preventive discussed    2. Screen for colon cancer  screening  - GASTROENTEROLOGY ADULT REF PROCEDURE ONLY; Future    3. Screening for malignant neoplasm of cervix  screening  - Pap imaged thin layer screen with HPV - recommended age 30 - 65 years (select HPV order below)  - HPV High Risk Types DNA Cervical    4. Hypertension goal BP (blood pressure) < 140/90  Not at goal - add amlodipine and change losartan to valsartan. Check labs and bp recheck in 4 weeks.  - COMPREHENSIVE METABOLIC PANEL  - amLODIPine-valsartan (EXFORGE) 5-320 MG tablet; Take 1 tablet by mouth daily  Dispense: 90 tablet; Refill: 0    5. Need for shingles vaccine    - ZOSTER VACCINE RECOMBINANT ADJUVANTED IM NJX    6. Need for prophylactic vaccination and inoculation against influenza    - C RIV4 (FLUBLOK) VACCINE RECOMBINANT DNA PRSRV ANTIBIO FREE, IM [35900]    7. Screen for STD (sexually transmitted disease)    - Chlamydia trachomatis PCR  - Hepatitis C antibody  - Hepatitis B surface antigen  - HIV Antigen Antibody Combo  - Neisseria gonorrhoeae PCR  - Treponema Abs w Reflex to RPR and Titer    The uses and side effects, including black box warnings as appropriate, were discussed in detail.  All patient questions were answered.  The patient was instructed to call immediately if any side effects developed.     Patient has been advised of split billing requirements and indicates understanding: Yes  COUNSELING:  Reviewed preventive health counseling, as reflected in patient  "instructions    Estimated body mass index is 27.38 kg/m  as calculated from the following:    Height as of this encounter: 1.613 m (5' 3.5\").    Weight as of this encounter: 71.2 kg (157 lb).        She reports that she has never smoked. She has never used smokeless tobacco.      Counseling Resources:  ATP IV Guidelines  Pooled Cohorts Equation Calculator  Breast Cancer Risk Calculator  BRCA-Related Cancer Risk Assessment: FHS-7 Tool  FRAX Risk Assessment  ICSI Preventive Guidelines  Dietary Guidelines for Americans, 2010  USDA's MyPlate  ASA Prophylaxis  Lung CA Screening    Lynda Painting MD  [facility]  "

## 2021-04-07 NOTE — NURSING NOTE
Prior to immunization administration, verified patients identity using patient s name and date of birth. Please see Immunization Activity for additional information.     Screening Questionnaire for Adult Immunization    Are you sick today?   No   Do you have allergies to medications, food, a vaccine component or latex?   No   Have you ever had a serious reaction after receiving a vaccination?   No   Do you have a long-term health problem with heart, lung, kidney, or metabolic disease (e.g., diabetes), asthma, a blood disorder, no spleen, complement component deficiency, a cochlear implant, or a spinal fluid leak?  Are you on long-term aspirin therapy?   No   Do you have cancer, leukemia, HIV/AIDS, or any other immune system problem?   No   Do you have a parent, brother, or sister with an immune system problem?   No   In the past 3 months, have you taken medications that affect  your immune system, such as prednisone, other steroids, or anticancer drugs; drugs for the treatment of rheumatoid arthritis, Crohn s disease, or psoriasis; or have you had radiation treatments?   No   Have you had a seizure, or a brain or other nervous system problem?   No   During the past year, have you received a transfusion of blood or blood    products, or been given immune (gamma) globulin or antiviral drug?   No   For women: Are you pregnant or is there a chance you could become       pregnant during the next month?   No   Have you received any vaccinations in the past 4 weeks?   No     Immunization questionnaire answers were all negative.        Per orders of Dr. Floyd, injection of Flublok and Shingrix given by Ita Figueroa MA. Patient instructed to remain in clinic for 15 minutes afterwards, and to report any adverse reaction to me immediately.       Screening performed by Ita Figueroa MA on 4/7/2021 at 10:40 AM.

## 2021-04-08 ENCOUNTER — TELEPHONE (OUTPATIENT)
Dept: FAMILY MEDICINE | Facility: CLINIC | Age: 56
End: 2021-04-08

## 2021-04-08 DIAGNOSIS — A74.9 CHLAMYDIA: Primary | ICD-10-CM

## 2021-04-08 LAB
C TRACH DNA SPEC QL NAA+PROBE: POSITIVE
HBV SURFACE AG SERPL QL IA: NONREACTIVE
HCV AB SERPL QL IA: NONREACTIVE
HIV 1+2 AB+HIV1 P24 AG SERPL QL IA: NONREACTIVE
N GONORRHOEA DNA SPEC QL NAA+PROBE: NEGATIVE
SPECIMEN SOURCE: ABNORMAL
SPECIMEN SOURCE: NORMAL
T PALLIDUM AB SER QL: NONREACTIVE

## 2021-04-08 RX ORDER — AZITHROMYCIN 250 MG/1
1000 TABLET, FILM COATED ORAL ONCE
Qty: 4 TABLET | Refills: 0 | Status: SHIPPED | OUTPATIENT
Start: 2021-04-08 | End: 2021-04-08

## 2021-04-08 NOTE — TELEPHONE ENCOUNTER
Please call and notify patient of positive chlamydia.  Rx for zithrmax 4 tablets all at once sent to [facility].  Patient should follow up in 6 week for repeat testing to be sure the chlamydia is gone.    Lynda Floyd M.D.

## 2021-04-08 NOTE — TELEPHONE ENCOUNTER
Mary Carmen with [facility] reporting positive Chlamydia results from 4/7/2021 labs  Patient was seen by provider on 4/7/2021 for annual physical and had STD screening tests completed    Hola Feng RN  [facility]

## 2021-04-08 NOTE — TELEPHONE ENCOUNTER
Patient updated on the below, no questions at this time, verbalized understanding.    Chrissy Thurman RN

## 2021-04-08 NOTE — RESULT ENCOUNTER NOTE
Ms. Arellano,    Your labs are normal except for a positive chlamydia.  You will receive a call from a nurse on next steps.  A prescription has been sent to [facility] to treat this.  One of your liver enzymes is slightly khushbu.  I recommend we repeat this in a year.  Please contact the clinic if you have additional questions.  Thank you.    Sincerely,    Lynda Painting MD

## 2021-04-09 LAB
COPATH REPORT: ABNORMAL
PAP: ABNORMAL

## 2021-04-13 ENCOUNTER — PATIENT OUTREACH (OUTPATIENT)
Dept: FAMILY MEDICINE | Facility: CLINIC | Age: 56
End: 2021-04-13

## 2021-04-23 NOTE — TELEPHONE ENCOUNTER
Does not appear that MDDIAMOND report was completed?    LISANDRO report completed and faxed    Hola Feng RN  [facility]

## 2021-04-27 ENCOUNTER — TELEPHONE (OUTPATIENT)
Dept: FAMILY MEDICINE | Facility: CLINIC | Age: 56
End: 2021-04-27

## 2021-04-27 NOTE — TELEPHONE ENCOUNTER
Please call patient to assist her in scheduling a colposcopy with Dr. Vladimir Painting.     Ok to leave detailed message for patient at [phone], if she does not answer.     Patient was notified of pap and HPV results and educated on pap, HPV and colposcopy. All questions answered to her satisfaction.

## 2021-04-27 NOTE — TELEPHONE ENCOUNTER
Called patient and scheduled a Colposcopy with Dr Vladimir Painting's first available appt on 5/26/2021. Procedure Room reserved from 3:00-4:00  [facility]  2nd Floor  Primary Care

## 2021-04-28 NOTE — TELEPHONE ENCOUNTER
"Requested Prescriptions   Pending Prescriptions Disp Refills     valACYclovir (VALTREX) 500 MG tablet [Pharmacy Med Name: valACYclovir HCl Oral Tablet 500 MG] 135 tablet 0          Last Written Prescription Date:  10/23/18  Last Fill Quantity: 135,  # refills: 0   Last Office Visit with [facility], P or [facility] prescribing provider:  11/12/18   Future Office Visit:    Next 5 appointments (look out 90 days)    Mar 13, 2019  9:00 AM CDT  Office Visit with Lynda Painting MD  [facility] ([facility]) [address] [place] [id number] [phone]          Sig: TAKE 1 TABLET BY MOUTH EVERY DAY AND INCREASE TO 2 TABLETS DAILY FOR 10 DAYS WITH OUTBREAKS    Antivirals for Herpes Protocol Passed - 3/10/2019 12:49 PM       Passed - Patient is age 12 or older       Passed - Recent (12 mo) or future (30 days) visit within the authorizing provider's specialty    Patient had office visit in the last 12 months or has a visit in the next 30 days with authorizing provider or within the authorizing provider's specialty.  See \"Patient Info\" tab in inbasket, or \"Choose Columns\" in Meds & Orders section of the refill encounter.             Passed - Medication is active on med list       Passed - Normal serum creatinine on file in past 12 months    Recent Labs   Lab Test 09/12/18  0852   CR 0.88                   Henry Faarax  [facility]  " Electronically signed by:    ALE Medeiros, OTR/L            Date:4/28/2021

## 2021-04-29 DIAGNOSIS — J45.40 MODERATE PERSISTENT ASTHMA WITHOUT COMPLICATION: ICD-10-CM

## 2021-04-29 DIAGNOSIS — K21.9 GASTROESOPHAGEAL REFLUX DISEASE WITHOUT ESOPHAGITIS: ICD-10-CM

## 2021-04-29 RX ORDER — MONTELUKAST SODIUM 10 MG/1
TABLET ORAL
Qty: 90 TABLET | Refills: 0 | Status: SHIPPED | OUTPATIENT
Start: 2021-04-29 | End: 2021-06-02

## 2021-04-29 NOTE — TELEPHONE ENCOUNTER
Routing refill request to provider for review/approval because:  ACT has not been updated since 2018.    ACT Total Scores 4/19/2017 10/16/2017 11/12/2018   ACT TOTAL SCORE - - -   ASTHMA ER VISITS - - -   ASTHMA HOSPITALIZATIONS - - -   ACT TOTAL SCORE (Goal Greater than or Equal to 20) 24 25 25   In the past 12 months, how many times did you visit the emergency room for your asthma without being admitted to the hospital? 0 0 0   In the past 12 months, how many times were you hospitalized overnight because of your asthma? 0 0 0     Flores Downing, MICHAN, RN

## 2021-05-19 DIAGNOSIS — Z11.59 ENCOUNTER FOR SCREENING FOR OTHER VIRAL DISEASES: ICD-10-CM

## 2021-05-26 ENCOUNTER — OFFICE VISIT (OUTPATIENT)
Dept: FAMILY MEDICINE | Facility: CLINIC | Age: 56
End: 2021-05-26
Payer: COMMERCIAL

## 2021-05-26 VITALS
BODY MASS INDEX: 26.63 KG/M2 | HEART RATE: 82 BPM | DIASTOLIC BLOOD PRESSURE: 94 MMHG | RESPIRATION RATE: 18 BRPM | SYSTOLIC BLOOD PRESSURE: 135 MMHG | TEMPERATURE: 97.9 F | HEIGHT: 64 IN | WEIGHT: 156 LBS | OXYGEN SATURATION: 99 %

## 2021-05-26 DIAGNOSIS — R87.612 LGSIL ON PAP SMEAR OF CERVIX: Primary | ICD-10-CM

## 2021-05-26 PROCEDURE — 57455 BIOPSY OF CERVIX W/SCOPE: CPT | Performed by: FAMILY MEDICINE

## 2021-05-26 PROCEDURE — 88305 TISSUE EXAM BY PATHOLOGIST: CPT | Performed by: PATHOLOGY

## 2021-05-26 ASSESSMENT — PAIN SCALES - GENERAL: PAINLEVEL: NO PAIN (0)

## 2021-05-26 ASSESSMENT — MIFFLIN-ST. JEOR: SCORE: 1284.67

## 2021-05-26 NOTE — PROGRESS NOTES
Kylie Arellano is a 56 year old female who presents for initial colposcopy. Pap smear 1 months ago showed: LSIL. The prior pap showed ASC-US with HR HPV.     Past Medical History:   Diagnosis Date     Cervical dysplasia, moderate 10/10    АЛЕКСАНДР 2-3      Cervical high risk HPV (human papillomavirus) test positive 04/19/2017, 11/12/18    See problem list     Dermatitis      GERD (gastroesophageal reflux disease)      HSV-1 (herpes simplex virus 1) infection      HTN 2005     Hypertension goal BP (blood pressure) < 140/90 2/10/2011     Mild depression (H)      Mild persistent asthma 2007     S/P LEEP of cervix 10/10     Seasonal allergic rhinitis      Family History   Problem Relation Age of Onset     Connective Tissue Disorder Mother         autoimmune disorder that affects muscles     Cancer Maternal Grandfather      Diabetes Paternal Grandfather      Hypertension No family hx of      Cerebrovascular Disease No family hx of      Thyroid Disease No family hx of      Glaucoma No family hx of      Macular Degeneration No family hx of             No LMP recorded (lmp unknown). Patient is postmenopausal.  Type of contraception: condoms  History   Smoking Status     Never Smoker   Smokeless Tobacco     Never Used     Comment: no second hand smoke     History of sexual abuse:  No  Allergies as of 05/26/2021     (No Known Allergies)        PROCEDURE:  Before the procedure, it was ensured that the patient was educated regarding the nature of her findings to date, the implications of them, and what was to be done. She has been made aware of the role of HPV, the natural history of infection, ways to minimize her future risk, the effect of HPV on the cervix, and treatment options available should they be indicated. The   pathophysiology of the cervix, including a discussion of squamous vs. endometrial cells, and squamous metaplasia have all been reviewed, using illustrations and sketches. The details of the colposcopic procedure  were reviewed, as well as the risks of missed diagnoses, pain, infection and bleeding. All questions were answered before proceeding, and informed consent was therefore obtained.    Bimanual examination: was performed and was unremarkable.  Unenhanced examination of the cervix was normal without lesions.  Pap smear and endocervical sampling not obtained due to:    not due  Pap repeated?:  No  SCJ seen?:  no  Endocervical speculum needed?:  No  ECC done?:  No  Lugol's solution used?:  Yes normal uptake  Satisfactory examination?:  yes    Vaginal vault: normal to cursory inspection   Urethra normal?:  yes  Labia normal?:  yes  Perineum normal?:  yes  Rectum normal?:  yes    FINDINGS:      Cervix: punctation noted at 9:00  Procedure: biopsies taken (not including ECC): 1.    Procedure summary: Patient tolerated procedure well     Assessment: АЛЕКСАНДР 2    Plan: Specimens labelled and sent to pathology., Will base further treatment on pathology findings., treatment options discussed with patient, post biopsy instructions given to patient and call to discuss Pathology results     Lynda Floyd M.D.

## 2021-05-26 NOTE — PATIENT INSTRUCTIONS
At [facility], we strive to deliver an exceptional experience to you, every time we see you. If you receive a survey, please complete it as we do value your feedback.  If you have MyChart, you can expect to receive results automatically within 24 hours of their completion.  Your provider will send a note interpreting your results as well.   If you do not have MyChart, you should receive your results in about a week by mail.    Your care team:                            Family Medicine Internal Medicine   MD Shimon Blevins MD Shantel Branch-Fleming, MD Srinivasa Vaka, MD Katya Belousova, PAMICHELLE Stone, APRN CNP    Marquis Reich, MD Pediatrics   Ray Mcgarry, PAMICHELLE Longoria, CNP MD Janice Rosenberg APRN CNP   MD Aminata Locke MD Deborah Mielke, MD Destiny Moore, APRN [facility]      Clinic hours: Monday - Thursday 7 am-6 pm; Fridays 7 am-5 pm.   Urgent care: Monday - Friday 10 am- 8 pm; Saturday and Sunday 9 am-5 pm.    Clinic: [phone]       [facility]: Monday - Thursday 8 am - 7 pm; Friday 8 am - 6 pm  [facility]: [phone]     Use [email or web address] for 24/7 diagnosis and treatment of dozens of conditions.

## 2021-05-27 ENCOUNTER — MYC MEDICAL ADVICE (OUTPATIENT)
Dept: FAMILY MEDICINE | Facility: CLINIC | Age: 56
End: 2021-05-27
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
## 2021-05-27 PROCEDURE — 36415 COLL VENOUS BLD VENIPUNCTURE: CPT | Performed by: FAMILY MEDICINE

## 2021-05-27 PROCEDURE — 86618 LYME DISEASE ANTIBODY: CPT | Performed by: FAMILY MEDICINE

## 2021-05-27 RX ORDER — BISACODYL 5 MG/1
15 TABLET, DELAYED RELEASE ORAL SEE ADMIN INSTRUCTIONS
Qty: 3 TABLET | Refills: 0 | Status: SHIPPED | OUTPATIENT
Start: 2021-05-27 | End: 2021-07-20

## 2021-05-27 ASSESSMENT — ASTHMA QUESTIONNAIRES: ACT_TOTALSCORE: 25

## 2021-05-28 ENCOUNTER — E-VISIT (OUTPATIENT)
Dept: FAMILY MEDICINE | Facility: CLINIC | Age: 56
End: 2021-05-28
Payer: COMMERCIAL
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
## 2021-05-28 PROCEDURE — 99421 OL DIG E/M SVC 5-10 MIN: CPT | Performed by: FAMILY MEDICINE

## 2021-05-28 NOTE — TELEPHONE ENCOUNTER
Good morning, While looking for orders I noticed the mychart conversation. If we store her sample in the freezer, it will still be good until her appointment in June.     Mary Carmen Alcantara on 5/28/2021 at 11:07 AM

## 2021-05-28 NOTE — TELEPHONE ENCOUNTER
Called our lab and they will run the test.  Santino Soliman (AKA:  Alexa), , [facility], Primary Care

## 2021-05-28 NOTE — PATIENT INSTRUCTIONS
Thank you for choosing us for your care. Given your symptoms, I would like you to do a lab-only visit to determine what is causing them.  I have placed the orders.  Please schedule an appointment with the lab right here [email or web address], or call [phone].  I will let you know when the results are back and next steps to take.

## 2021-05-29 LAB — B BURGDOR IGG+IGM SER QL: 0.06 (ref 0–0.89)

## 2021-06-01 DIAGNOSIS — Z11.59 ENCOUNTER FOR SCREENING FOR OTHER VIRAL DISEASES: ICD-10-CM

## 2021-06-01 LAB
COPATH REPORT: NORMAL
SARS-COV-2 RNA RESP QL NAA+PROBE: NORMAL
SPECIMEN SOURCE: NORMAL

## 2021-06-01 PROCEDURE — U0005 INFEC AGEN DETEC AMPLI PROBE: HCPCS | Performed by: FAMILY MEDICINE

## 2021-06-01 PROCEDURE — U0003 INFECTIOUS AGENT DETECTION BY NUCLEIC ACID (DNA OR RNA); SEVERE ACUTE RESPIRATORY SYNDROME CORONAVIRUS 2 (SARS-COV-2) (CORONAVIRUS DISEASE [COVID-19]), AMPLIFIED PROBE TECHNIQUE, MAKING USE OF HIGH THROUGHPUT TECHNOLOGIES AS DESCRIBED BY CMS-2020-01-R: HCPCS | Performed by: FAMILY MEDICINE

## 2021-06-01 NOTE — RESULT ENCOUNTER NOTE
Ms. Arellano,    The sample taken during your colposcopy was similar to your pap smear, so mild precancerous changes were seen.  Follow up in 1 year for repeat pap smear testing.    Please contact the clinic if you have additional questions.  Thank you.    Sincerely,    Lynda Painting MD

## 2021-06-02 DIAGNOSIS — J45.40 MODERATE PERSISTENT ASTHMA WITHOUT COMPLICATION: ICD-10-CM

## 2021-06-02 LAB
LABORATORY COMMENT REPORT: NORMAL
SARS-COV-2 RNA RESP QL NAA+PROBE: NEGATIVE
SPECIMEN SOURCE: NORMAL

## 2021-06-02 RX ORDER — MONTELUKAST SODIUM 10 MG/1
TABLET ORAL
Qty: 30 TABLET | Refills: 0 | Status: SHIPPED | OUTPATIENT
Start: 2021-06-02 | End: 2021-09-01

## 2021-06-04 ENCOUNTER — HOSPITAL ENCOUNTER (OUTPATIENT)
Facility: AMBULATORY SURGERY CENTER | Age: 56
Discharge: HOME | End: 2021-06-04
Attending: FAMILY MEDICINE | Admitting: FAMILY MEDICINE
Payer: COMMERCIAL

## 2021-06-04 VITALS
HEART RATE: 77 BPM | RESPIRATION RATE: 16 BRPM | DIASTOLIC BLOOD PRESSURE: 97 MMHG | OXYGEN SATURATION: 97 % | TEMPERATURE: 98.1 F | SYSTOLIC BLOOD PRESSURE: 134 MMHG

## 2021-06-04 DIAGNOSIS — Z12.11 SCREEN FOR COLON CANCER: Primary | ICD-10-CM

## 2021-06-04 PROBLEM — K57.30 DIVERTICULOSIS OF LARGE INTESTINE: Status: ACTIVE | Noted: 2021-06-04

## 2021-06-04 LAB — COLONOSCOPY: NORMAL

## 2021-06-04 PROCEDURE — 45385 COLONOSCOPY W/LESION REMOVAL: CPT

## 2021-06-04 PROCEDURE — 88305 TISSUE EXAM BY PATHOLOGIST: CPT | Performed by: FAMILY MEDICINE

## 2021-06-04 PROCEDURE — G8918 PT W/O PREOP ORDER IV AB PRO: HCPCS

## 2021-06-04 PROCEDURE — 99152 MOD SED SAME PHYS/QHP 5/>YRS: CPT | Mod: 59 | Performed by: FAMILY MEDICINE

## 2021-06-04 PROCEDURE — G8907 PT DOC NO EVENTS ON DISCHARG: HCPCS

## 2021-06-04 PROCEDURE — 45385 COLONOSCOPY W/LESION REMOVAL: CPT | Mod: PT | Performed by: FAMILY MEDICINE

## 2021-06-04 RX ORDER — ONDANSETRON 2 MG/ML
4 INJECTION INTRAMUSCULAR; INTRAVENOUS
Status: DISCONTINUED | OUTPATIENT
Start: 2021-06-04 | End: 2021-06-05 | Stop reason: HOSPADM

## 2021-06-04 RX ORDER — LIDOCAINE 40 MG/G
CREAM TOPICAL
Status: DISCONTINUED | OUTPATIENT
Start: 2021-06-04 | End: 2021-06-05 | Stop reason: HOSPADM

## 2021-06-04 RX ORDER — GUAIFENESIN AND DEXTROMETHORPHAN HYDROBROMIDE 600; 30 MG/1; MG/1
1 TABLET, EXTENDED RELEASE ORAL EVERY 12 HOURS
COMMUNITY
End: 2021-11-16

## 2021-06-04 RX ORDER — FENTANYL CITRATE 50 UG/ML
INJECTION, SOLUTION INTRAMUSCULAR; INTRAVENOUS PRN
Status: DISCONTINUED | OUTPATIENT
Start: 2021-06-04 | End: 2021-06-04 | Stop reason: HOSPADM

## 2021-06-08 ENCOUNTER — PATIENT OUTREACH (OUTPATIENT)
Dept: FAMILY MEDICINE | Facility: CLINIC | Age: 56
End: 2021-06-08

## 2021-06-08 LAB — COPATH REPORT: NORMAL

## 2021-07-09 DIAGNOSIS — J30.89 CHRONIC NON-SEASONAL ALLERGIC RHINITIS: ICD-10-CM

## 2021-07-09 DIAGNOSIS — I10 HYPERTENSION GOAL BP (BLOOD PRESSURE) < 140/90: ICD-10-CM

## 2021-07-12 RX ORDER — LEVOCETIRIZINE DIHYDROCHLORIDE 5 MG/1
10 TABLET, FILM COATED ORAL EVERY EVENING
Qty: 60 TABLET | Refills: 11 | Status: SHIPPED | OUTPATIENT
Start: 2021-07-12 | End: 2022-08-02

## 2021-07-12 RX ORDER — AMLODIPINE AND VALSARTAN 5; 320 MG/1; MG/1
TABLET ORAL
Qty: 90 TABLET | Refills: 0 | Status: SHIPPED | OUTPATIENT
Start: 2021-07-12 | End: 2021-10-15

## 2021-07-12 NOTE — TELEPHONE ENCOUNTER
Routing refill request to provider for review/approval because:  BP Readings from Last 6 Encounters:   06/04/21 (!) 134/97   05/26/21 (!) 135/94   04/07/21 (!) 136/96   11/04/19 (!) 162/110   06/25/19 (!) 173/108   12/18/18 (!) 168/115     Theresa Gonzalez RN

## 2021-07-12 NOTE — TELEPHONE ENCOUNTER
levocetirizine 5mg tab; take 2 tabs by mouth every evening  To provider to advise; states exceeds max dosing.  Last OV: 5/26/21 with pcp.

## 2021-07-20 ENCOUNTER — E-VISIT (OUTPATIENT)
Dept: FAMILY MEDICINE | Facility: CLINIC | Age: 56
End: 2021-07-20
Payer: COMMERCIAL

## 2021-07-20 DIAGNOSIS — J01.90 ACUTE SINUSITIS WITH SYMPTOMS > 10 DAYS: ICD-10-CM

## 2021-07-20 DIAGNOSIS — J45.41 MODERATE PERSISTENT ASTHMA WITH EXACERBATION: Primary | ICD-10-CM

## 2021-07-20 PROCEDURE — 99421 OL DIG E/M SVC 5-10 MIN: CPT | Performed by: FAMILY MEDICINE

## 2021-07-20 RX ORDER — PREDNISONE 20 MG/1
60 TABLET ORAL DAILY
Qty: 15 TABLET | Refills: 0 | Status: SHIPPED | OUTPATIENT
Start: 2021-07-20 | End: 2021-11-16

## 2021-07-31 DIAGNOSIS — K21.9 GASTROESOPHAGEAL REFLUX DISEASE WITHOUT ESOPHAGITIS: ICD-10-CM

## 2021-08-02 NOTE — TELEPHONE ENCOUNTER
Prescription approved per [facility] Refill Protocol.        Florecita Miller RN  [facility]

## 2021-08-27 DIAGNOSIS — J45.40 MODERATE PERSISTENT ASTHMA WITHOUT COMPLICATION: ICD-10-CM

## 2021-09-01 RX ORDER — MONTELUKAST SODIUM 10 MG/1
TABLET ORAL
Qty: 90 TABLET | Refills: 0 | Status: SHIPPED | OUTPATIENT
Start: 2021-09-01 | End: 2021-11-15

## 2021-09-04 ENCOUNTER — HEALTH MAINTENANCE LETTER (OUTPATIENT)
Age: 56
End: 2021-09-04

## 2021-10-26 DIAGNOSIS — K21.9 GASTROESOPHAGEAL REFLUX DISEASE WITHOUT ESOPHAGITIS: ICD-10-CM

## 2021-11-14 ENCOUNTER — E-VISIT (OUTPATIENT)
Dept: FAMILY MEDICINE | Facility: CLINIC | Age: 56
End: 2021-11-14
Payer: COMMERCIAL

## 2021-11-14 DIAGNOSIS — J45.41 MODERATE PERSISTENT ASTHMA WITH EXACERBATION: ICD-10-CM

## 2021-11-14 PROCEDURE — 99421 OL DIG E/M SVC 5-10 MIN: CPT | Performed by: FAMILY MEDICINE

## 2021-11-15 ENCOUNTER — MYC REFILL (OUTPATIENT)
Dept: FAMILY MEDICINE | Facility: CLINIC | Age: 56
End: 2021-11-15
Payer: COMMERCIAL

## 2021-11-15 DIAGNOSIS — K21.9 GASTROESOPHAGEAL REFLUX DISEASE WITHOUT ESOPHAGITIS: ICD-10-CM

## 2021-11-15 DIAGNOSIS — I10 HYPERTENSION GOAL BP (BLOOD PRESSURE) < 140/90: ICD-10-CM

## 2021-11-15 DIAGNOSIS — J45.40 MODERATE PERSISTENT ASTHMA WITHOUT COMPLICATION: ICD-10-CM

## 2021-11-16 RX ORDER — PREDNISONE 20 MG/1
60 TABLET ORAL DAILY
Qty: 15 TABLET | Refills: 0 | Status: SHIPPED | OUTPATIENT
Start: 2021-11-16 | End: 2021-11-21

## 2021-11-16 RX ORDER — MONTELUKAST SODIUM 10 MG/1
10 TABLET ORAL AT BEDTIME
Qty: 90 TABLET | Refills: 0 | Status: SHIPPED | OUTPATIENT
Start: 2021-11-16 | End: 2022-01-11

## 2021-11-16 RX ORDER — AMLODIPINE AND VALSARTAN 5; 320 MG/1; MG/1
1 TABLET ORAL DAILY
Qty: 30 TABLET | Refills: 0 | Status: SHIPPED | OUTPATIENT
Start: 2021-11-16 | End: 2021-12-15

## 2021-11-16 NOTE — TELEPHONE ENCOUNTER
Routing refill request to provider for review/approval because:  Ema given x1 and patient did not follow up, please advise  Given for the Singular and Omeprozole.    Routing refill request for amlodipine to provider for review/approval because:  Patient needs to be seen because:  Does not meet protocol.  BP Readings from Last 3 Encounters:   06/04/21 (!) 134/97   05/26/21 (!) 135/94   04/07/21 (!) 136/96     Cat Espinosa RN [facility]

## 2021-11-23 ENCOUNTER — MYC MEDICAL ADVICE (OUTPATIENT)
Dept: FAMILY MEDICINE | Facility: CLINIC | Age: 56
End: 2021-11-23
Payer: COMMERCIAL

## 2021-11-23 DIAGNOSIS — J45.40 MODERATE PERSISTENT ASTHMA WITHOUT COMPLICATION: Primary | ICD-10-CM

## 2021-11-23 NOTE — TELEPHONE ENCOUNTER
Per patients mychart message she would like a new prescription sent to her pharmacy that is listed.     Please sign and approve if appropriate.    Cat Espinosa RN [facility]

## 2021-12-15 ENCOUNTER — MYC REFILL (OUTPATIENT)
Dept: FAMILY MEDICINE | Facility: CLINIC | Age: 56
End: 2021-12-15
Payer: COMMERCIAL

## 2021-12-15 DIAGNOSIS — J45.40 MODERATE PERSISTENT ASTHMA WITHOUT COMPLICATION: ICD-10-CM

## 2021-12-15 DIAGNOSIS — K21.9 GASTROESOPHAGEAL REFLUX DISEASE WITHOUT ESOPHAGITIS: ICD-10-CM

## 2021-12-15 DIAGNOSIS — I10 HYPERTENSION GOAL BP (BLOOD PRESSURE) < 140/90: ICD-10-CM

## 2021-12-17 RX ORDER — MONTELUKAST SODIUM 10 MG/1
10 TABLET ORAL AT BEDTIME
Qty: 90 TABLET | Refills: 0 | OUTPATIENT
Start: 2021-12-17

## 2021-12-17 RX ORDER — AMLODIPINE AND VALSARTAN 5; 320 MG/1; MG/1
1 TABLET ORAL DAILY
Qty: 30 TABLET | Refills: 0 | Status: SHIPPED | OUTPATIENT
Start: 2021-12-17 | End: 2022-02-08

## 2021-12-17 NOTE — TELEPHONE ENCOUNTER
Routing refill request to provider for review/approval because:  Ema given x1 and patient did not follow up, please advise  Maria C William BSN, RN

## 2022-01-10 DIAGNOSIS — J45.40 MODERATE PERSISTENT ASTHMA WITHOUT COMPLICATION: ICD-10-CM

## 2022-01-11 RX ORDER — MONTELUKAST SODIUM 10 MG/1
TABLET ORAL
Qty: 90 TABLET | Refills: 0 | Status: SHIPPED | OUTPATIENT
Start: 2022-01-11 | End: 2022-03-17

## 2022-01-11 NOTE — TELEPHONE ENCOUNTER
Routing refill request to provider for review/approval because:  Labs not current:  ACT  Neeta Cottrell RN

## 2022-02-07 DIAGNOSIS — I10 HYPERTENSION GOAL BP (BLOOD PRESSURE) < 140/90: ICD-10-CM

## 2022-02-08 RX ORDER — AMLODIPINE AND VALSARTAN 5; 320 MG/1; MG/1
1 TABLET ORAL DAILY
Qty: 30 TABLET | Refills: 0 | Status: SHIPPED | OUTPATIENT
Start: 2022-02-08 | End: 2022-03-17

## 2022-02-08 NOTE — TELEPHONE ENCOUNTER
"Requested Prescriptions   Pending Prescriptions Disp Refills    amLODIPine-valsartan (EXFORGE) 5-320 MG tablet [Pharmacy Med Name: amLODIPine Besylate-Valsartan Oral Tablet 5-320 MG] 30 tablet 0     Sig: Take 1 tablet by mouth daily Blood pressure reading due for more.        Angiotensin-II Receptors Failed - 2/7/2022  2:12 PM        Failed - Last blood pressure under 140/90 in past 12 months       BP Readings from Last 3 Encounters:   06/04/21 (!) 134/97   05/26/21 (!) 135/94   04/07/21 (!) 136/96                 Passed - Recent (12 mo) or future (30 days) visit within the authorizing provider's specialty     Patient has had an office visit with the authorizing provider or a provider within the authorizing providers department within the previous 12 mos or has a future within next 30 days. See \"Patient Info\" tab in inbasket, or \"Choose Columns\" in Meds & Orders section of the refill encounter.              Passed - Medication is active on med list        Passed - Patient is age 18 or older        Passed - No active pregnancy on record        Passed - Normal serum creatinine on file in past 12 months     Recent Labs   Lab Test 04/07/21  1040   CR 0.94       Ok to refill medication if creatinine is low          Passed - Normal serum potassium on file in past 12 months       Recent Labs   Lab Test 04/07/21  1040   POTASSIUM 3.9                    Passed - No positive pregnancy test in past 12 months       Calcium Channel Blockers Protocol  Failed - 2/7/2022  2:12 PM        Failed - Blood pressure under 140/90 in past 12 months       BP Readings from Last 3 Encounters:   06/04/21 (!) 134/97   05/26/21 (!) 135/94   04/07/21 (!) 136/96                 Passed - Recent (12 mo) or future (30 days) visit within the authorizing provider's specialty     Patient has had an office visit with the authorizing provider or a provider within the authorizing providers department within the previous 12 mos or has a future within next " "30 days. See \"Patient Info\" tab in inbasket, or \"Choose Columns\" in Meds & Orders section of the refill encounter.              Passed - Medication is active on med list        Passed - Patient is age 18 or older        Passed - No active pregnancy on record        Passed - Normal serum creatinine on file in past 12 months     Recent Labs   Lab Test 04/07/21  1040   CR 0.94       Ok to refill medication if creatinine is low          Passed - No positive pregnancy test in past 12 months              "

## 2022-03-17 ENCOUNTER — MYC REFILL (OUTPATIENT)
Dept: FAMILY MEDICINE | Facility: CLINIC | Age: 57
End: 2022-03-17
Payer: COMMERCIAL

## 2022-03-17 DIAGNOSIS — I10 HYPERTENSION GOAL BP (BLOOD PRESSURE) < 140/90: ICD-10-CM

## 2022-03-17 DIAGNOSIS — J45.40 MODERATE PERSISTENT ASTHMA WITHOUT COMPLICATION: ICD-10-CM

## 2022-03-17 DIAGNOSIS — K21.9 GASTROESOPHAGEAL REFLUX DISEASE WITHOUT ESOPHAGITIS: ICD-10-CM

## 2022-03-17 RX ORDER — MONTELUKAST SODIUM 10 MG/1
10 TABLET ORAL AT BEDTIME
Qty: 90 TABLET | Refills: 0 | Status: SHIPPED | OUTPATIENT
Start: 2022-03-17 | End: 2022-06-28

## 2022-03-17 RX ORDER — AMLODIPINE AND VALSARTAN 5; 320 MG/1; MG/1
1 TABLET ORAL DAILY
Qty: 30 TABLET | Refills: 0 | Status: SHIPPED | OUTPATIENT
Start: 2022-03-17 | End: 2022-04-28

## 2022-04-14 DIAGNOSIS — B00.1 RECURRENT COLD SORES: ICD-10-CM

## 2022-04-14 RX ORDER — VALACYCLOVIR HYDROCHLORIDE 500 MG/1
TABLET, FILM COATED ORAL
Qty: 135 TABLET | Refills: 0 | Status: SHIPPED | OUTPATIENT
Start: 2022-04-14 | End: 2022-09-15

## 2022-04-14 NOTE — TELEPHONE ENCOUNTER
"Requested Prescriptions   Pending Prescriptions Disp Refills    valACYclovir (VALTREX) 500 MG tablet [Pharmacy Med Name: valACYclovir HCl Oral Tablet 500 MG] 135 tablet 0     Sig: TAKE 1 TABLET BY MOUTH EVERY DAY AND INCREASE TO 2 TABLETS DAILY FOR 10 DAYS WITH OUTBREAKS        Antivirals for Herpes Protocol Failed - 4/14/2022  7:56 AM        Failed - Normal serum creatinine on file in past 12 months     Recent Labs   Lab Test 04/07/21  1040   CR 0.94       Ok to refill medication if creatinine is low          Passed - Patient is age 12 or older        Passed - Recent (12 mo) or future (30 days) visit within the authorizing provider's specialty     Patient has had an office visit with the authorizing provider or a provider within the authorizing providers department within the previous 12 mos or has a future within next 30 days. See \"Patient Info\" tab in inbasket, or \"Choose Columns\" in Meds & Orders section of the refill encounter.              Passed - Medication is active on med list              "

## 2022-04-27 DIAGNOSIS — I10 HYPERTENSION GOAL BP (BLOOD PRESSURE) < 140/90: ICD-10-CM

## 2022-04-27 DIAGNOSIS — J45.40 MODERATE PERSISTENT ASTHMA WITHOUT COMPLICATION: ICD-10-CM

## 2022-04-28 RX ORDER — MOMETASONE FUROATE AND FORMOTEROL FUMARATE DIHYDRATE 100; 5 UG/1; UG/1
AEROSOL RESPIRATORY (INHALATION)
Qty: 13 G | Refills: 0 | Status: SHIPPED | OUTPATIENT
Start: 2022-04-28 | End: 2022-06-03

## 2022-04-28 RX ORDER — AMLODIPINE AND VALSARTAN 5; 320 MG/1; MG/1
TABLET ORAL
Qty: 30 TABLET | Refills: 0 | Status: SHIPPED | OUTPATIENT
Start: 2022-04-28 | End: 2022-06-03

## 2022-04-28 NOTE — TELEPHONE ENCOUNTER
"Routing refill request to provider for review/approval because:  Ema given x1 and patient did not follow up, please advise    Requested Prescriptions   Pending Prescriptions Disp Refills    amLODIPine-valsartan (EXFORGE) 5-320 MG tablet [Pharmacy Med Name: amLODIPine Besylate-Valsartan Oral Tablet 5-320 MG] 30 tablet 0     Sig: Take 1 tablet by mouth once per day. Blood pressure reading due for more.        Angiotensin-II Receptors Failed - 4/27/2022  2:00 AM        Failed - Last blood pressure under 140/90 in past 12 months       BP Readings from Last 3 Encounters:   06/04/21 (!) 134/97   05/26/21 (!) 135/94   04/07/21 (!) 136/96                 Failed - Normal serum creatinine on file in past 12 months     Recent Labs   Lab Test 04/07/21  1040   CR 0.94       Ok to refill medication if creatinine is low          Failed - Normal serum potassium on file in past 12 months       Recent Labs   Lab Test 04/07/21  1040   POTASSIUM 3.9                    Passed - Recent (12 mo) or future (30 days) visit within the authorizing provider's specialty     Patient has had an office visit with the authorizing provider or a provider within the authorizing providers department within the previous 12 mos or has a future within next 30 days. See \"Patient Info\" tab in inbasket, or \"Choose Columns\" in Meds & Orders section of the refill encounter.              Passed - Medication is active on med list        Passed - Patient is age 18 or older        Passed - No active pregnancy on record        Passed - No positive pregnancy test in past 12 months       Calcium Channel Blockers Protocol  Failed - 4/27/2022  2:00 AM        Failed - Blood pressure under 140/90 in past 12 months       BP Readings from Last 3 Encounters:   06/04/21 (!) 134/97   05/26/21 (!) 135/94   04/07/21 (!) 136/96                 Failed - Normal serum creatinine on file in past 12 months     Recent Labs   Lab Test 04/07/21  1040   CR 0.94       Ok to refill " "medication if creatinine is low          Passed - Recent (12 mo) or future (30 days) visit within the authorizing provider's specialty     Patient has had an office visit with the authorizing provider or a provider within the authorizing providers department within the previous 12 mos or has a future within next 30 days. See \"Patient Info\" tab in inbasket, or \"Choose Columns\" in Meds & Orders section of the refill encounter.              Passed - Medication is active on med list        Passed - Patient is age 18 or older        Passed - No active pregnancy on record        Passed - No positive pregnancy test in past 12 months           DULERA 100-5 MCG/ACT inhaler [Pharmacy Med Name: Dulera Inhalation Aerosol 100-5 MCG/ACT] 13 g 0     Sig: Inhale 2 puffs by mouth into the lungs 2 times daily        Inhaled Steroids Protocol Failed - 4/27/2022  2:00 AM        Failed - Asthma control assessment score within normal limits in last 6 months     Please review ACT score.           Passed - Patient is age 12 or older        Passed - Medication is active on med list        Passed - Recent (6 mo) or future (30 days) visit within the authorizing provider's specialty     Patient had office visit in the last 6 months or has a visit in the next 30 days with authorizing provider or within the authorizing provider's specialty.  See \"Patient Info\" tab in inbasket, or \"Choose Columns\" in Meds & Orders section of the refill encounter.           Long-Acting Beta Agonist Inhalers Protocol  Failed - 4/27/2022  2:00 AM        Failed - Asthma control assessment score within normal limits in last 6 months     Please review ACT score.           Passed - Patient is age 12 or older        Passed - Medication is active on med list        Passed - Recent (6 mo) or future (30 days) visit within the authorizing provider's specialty     Patient had office visit in the last 6 months or has a visit in the next 30 days with authorizing provider or " "within the authorizing provider's specialty.  See \"Patient Info\" tab in inbasket, or \"Choose Columns\" in Meds & Orders section of the refill encounter.                Sharyn Tejeda RN, BSN  [facility]        "

## 2022-05-11 ENCOUNTER — PATIENT OUTREACH (OUTPATIENT)
Dept: FAMILY MEDICINE | Facility: CLINIC | Age: 57
End: 2022-05-11
Payer: COMMERCIAL

## 2022-05-11 DIAGNOSIS — Z98.890 S/P LEEP (STATUS POST LOOP ELECTROSURGICAL EXCISION PROCEDURE): ICD-10-CM

## 2022-05-24 DIAGNOSIS — J30.89 CHRONIC NON-SEASONAL ALLERGIC RHINITIS: ICD-10-CM

## 2022-05-26 RX ORDER — FLUTICASONE PROPIONATE 50 MCG
SPRAY, SUSPENSION (ML) NASAL
Qty: 48 G | Refills: 0 | Status: SHIPPED | OUTPATIENT
Start: 2022-05-26 | End: 2022-12-05

## 2022-05-26 NOTE — TELEPHONE ENCOUNTER
Prescription approved per [facility] Refill Protocol.    Flores Griffiths RN  [facility]

## 2022-05-31 DIAGNOSIS — I10 HYPERTENSION GOAL BP (BLOOD PRESSURE) < 140/90: ICD-10-CM

## 2022-05-31 DIAGNOSIS — J45.40 MODERATE PERSISTENT ASTHMA WITHOUT COMPLICATION: ICD-10-CM

## 2022-06-02 NOTE — TELEPHONE ENCOUNTER
Routing refill request to provider for review/approval because:  Labs not current:  creatinine, potassium  Patient needs to be seen because it has been more than 1 year since last office visit.

## 2022-06-03 RX ORDER — AMLODIPINE AND VALSARTAN 5; 320 MG/1; MG/1
TABLET ORAL
Qty: 30 TABLET | Refills: 0 | Status: SHIPPED | OUTPATIENT
Start: 2022-06-03 | End: 2022-07-13

## 2022-06-03 RX ORDER — MOMETASONE FUROATE AND FORMOTEROL FUMARATE DIHYDRATE 100; 5 UG/1; UG/1
AEROSOL RESPIRATORY (INHALATION)
Qty: 13 G | Refills: 0 | Status: SHIPPED | OUTPATIENT
Start: 2022-06-03 | End: 2022-06-28

## 2022-06-11 ENCOUNTER — HEALTH MAINTENANCE LETTER (OUTPATIENT)
Age: 57
End: 2022-06-11

## 2022-07-10 DIAGNOSIS — I10 HYPERTENSION GOAL BP (BLOOD PRESSURE) < 140/90: ICD-10-CM

## 2022-07-11 NOTE — TELEPHONE ENCOUNTER
FYI to provider - Patient is lost to pap tracking follow-up. Attempts to contact pt have been made per reminder process and there has been no reply and/or no appt scheduled. Contact hx listed below.     8/23/10:pap HSIL, plan [facility]  10/2010:[facility] АЛЕКСАНДР 2-3, plan LEEP  10/2010:LEEP  2/10/2011: pap ASC-US HPV negative plan to repeat pap in 3-4 months.   12/7/11 pap NIL/negative HPV. Plan--Yearly pap's if this is normal. (due 12/2012)   12/31/12 pap NIL. Plan-- yearly pap  1/6/14 pap NIL  1/7/15 pap NIL/neg HPV. Plan-- pap and HPV cotest 1 year  3/7/16 pap NIL/ + HR HPV other. Plan: Colposcopy.   4/18/16 [facility]: ASCUS. Plan: cotest in 6 mo.   4/19/17: NIL Pap, + HR HPV (Neg 16/18). Plan cotest in 1 year.   11/12/18 NIL pap, + HR HPV (not 16 or 18). Plan colp.   6/18/19 Lost to follow-up for pap tracking  4/7/21 LSIL pap, + HR HPV. Plan: colp.  5/26/21 [facility] bx: АЛЕКСАНДР 1. Plan: Cotest in 1 yr.  5/11/22 Reminder mychart  6/13/22 Reminder call  - lm  7/11/22 Lost to follow up

## 2022-07-12 NOTE — TELEPHONE ENCOUNTER
Routing refill request to provider for review/approval because:  Labs not current.    Britt Watkins RN  [facility]

## 2022-07-13 RX ORDER — AMLODIPINE AND VALSARTAN 5; 320 MG/1; MG/1
TABLET ORAL
Qty: 30 TABLET | Refills: 0 | Status: SHIPPED | OUTPATIENT
Start: 2022-07-13 | End: 2022-08-02

## 2022-07-22 ENCOUNTER — TELEPHONE (OUTPATIENT)
Dept: FAMILY MEDICINE | Facility: CLINIC | Age: 57
End: 2022-07-22

## 2022-07-22 NOTE — LETTER
[address] [address]  [phone]  Dept: [phone]    July 22, 2022    Kylie Arellano  [address]  [place] [address]    Dear Kylie Arellano,     At [facility] {YES / NO:[id number]} Clinic we care about your health and are committed to providing quality patient care.    Which includes staying current on preventive cancer screenings.  You can increase your chances of finding and treating cancers through regular screenings.      Our records indicate you may be due for the following preventive screening(s):  {[id number]}    To schedule an appointment or discuss this screening further, you may contact us by phone at the {YES / NO:[phone]} or online through the patient [email or web address] @ [email or web address]    If you have had any of the screenings listed above at another facility, please call us so that we may update your chart.      Your partners in health,      Quality Committee at [facility] {YES / NO:[id number]} [facility]

## 2022-07-22 NOTE — TELEPHONE ENCOUNTER
Patient Quality Outreach    Patient is due for the following:   Breast Cancer Screening - Mammogram  Physical  - Overdue  Immunizations  -  Covid and Zoster    NEXT STEPS:   MyChart message and Letter send to Pt    Type of outreach:    Sent MyChart message. and Sent letter.    Next Steps:  Reach out within 90 days via MyChart and Letter.    Max number of attempts reached: Yes. Will try again in 90 days if patient still on fail list.    Questions for provider review:    None     Michael Garner MA  Chart routed to Provider.

## 2022-08-01 DIAGNOSIS — J45.40 MODERATE PERSISTENT ASTHMA WITHOUT COMPLICATION: ICD-10-CM

## 2022-08-01 DIAGNOSIS — J30.89 CHRONIC NON-SEASONAL ALLERGIC RHINITIS: ICD-10-CM

## 2022-08-01 DIAGNOSIS — I10 HYPERTENSION GOAL BP (BLOOD PRESSURE) < 140/90: ICD-10-CM

## 2022-08-01 DIAGNOSIS — K21.9 GASTROESOPHAGEAL REFLUX DISEASE WITHOUT ESOPHAGITIS: ICD-10-CM

## 2022-08-02 RX ORDER — MOMETASONE FUROATE AND FORMOTEROL FUMARATE DIHYDRATE 100; 5 UG/1; UG/1
AEROSOL RESPIRATORY (INHALATION)
Qty: 13 G | Refills: 0 | Status: SHIPPED | OUTPATIENT
Start: 2022-08-02 | End: 2022-10-03

## 2022-08-02 RX ORDER — LEVOCETIRIZINE DIHYDROCHLORIDE 5 MG/1
10 TABLET, FILM COATED ORAL EVERY EVENING
Qty: 60 TABLET | Refills: 1 | Status: SHIPPED | OUTPATIENT
Start: 2022-08-02 | End: 2022-10-14

## 2022-08-02 RX ORDER — AMLODIPINE AND VALSARTAN 5; 320 MG/1; MG/1
TABLET ORAL
Qty: 30 TABLET | Refills: 0 | Status: SHIPPED | OUTPATIENT
Start: 2022-08-02 | End: 2022-09-15

## 2022-08-02 RX ORDER — MONTELUKAST SODIUM 10 MG/1
TABLET ORAL
Qty: 30 TABLET | Refills: 0 | Status: SHIPPED | OUTPATIENT
Start: 2022-08-02 | End: 2022-10-03

## 2022-09-15 DIAGNOSIS — K21.9 GASTROESOPHAGEAL REFLUX DISEASE WITHOUT ESOPHAGITIS: ICD-10-CM

## 2022-09-15 DIAGNOSIS — I10 HYPERTENSION GOAL BP (BLOOD PRESSURE) < 140/90: ICD-10-CM

## 2022-09-15 DIAGNOSIS — B00.1 RECURRENT COLD SORES: ICD-10-CM

## 2022-09-15 RX ORDER — VALACYCLOVIR HYDROCHLORIDE 500 MG/1
TABLET, FILM COATED ORAL
Qty: 135 TABLET | Refills: 0 | Status: SHIPPED | OUTPATIENT
Start: 2022-09-15 | End: 2022-12-13

## 2022-09-15 RX ORDER — AMLODIPINE AND VALSARTAN 5; 320 MG/1; MG/1
TABLET ORAL
Qty: 30 TABLET | Refills: 0 | Status: SHIPPED | OUTPATIENT
Start: 2022-09-15 | End: 2022-10-03

## 2022-10-03 ENCOUNTER — OFFICE VISIT (OUTPATIENT)
Dept: FAMILY MEDICINE | Facility: CLINIC | Age: 57
End: 2022-10-03
Payer: COMMERCIAL

## 2022-10-03 VITALS
WEIGHT: 154 LBS | HEIGHT: 64 IN | BODY MASS INDEX: 26.29 KG/M2 | TEMPERATURE: 97.3 F | OXYGEN SATURATION: 100 % | RESPIRATION RATE: 20 BRPM | HEART RATE: 93 BPM | DIASTOLIC BLOOD PRESSURE: 88 MMHG | SYSTOLIC BLOOD PRESSURE: 134 MMHG

## 2022-10-03 DIAGNOSIS — I10 HYPERTENSION GOAL BP (BLOOD PRESSURE) < 140/90: ICD-10-CM

## 2022-10-03 DIAGNOSIS — Z23 NEED FOR PROPHYLACTIC VACCINATION AND INOCULATION AGAINST INFLUENZA: ICD-10-CM

## 2022-10-03 DIAGNOSIS — Z12.4 CERVICAL CANCER SCREENING: ICD-10-CM

## 2022-10-03 DIAGNOSIS — J45.40 MODERATE PERSISTENT ASTHMA WITHOUT COMPLICATION: Primary | ICD-10-CM

## 2022-10-03 DIAGNOSIS — Z23 NEED FOR SHINGLES VACCINE: ICD-10-CM

## 2022-10-03 DIAGNOSIS — B00.1 RECURRENT COLD SORES: ICD-10-CM

## 2022-10-03 DIAGNOSIS — R74.8 ELEVATED ALKALINE PHOSPHATASE LEVEL: ICD-10-CM

## 2022-10-03 PROCEDURE — 90682 RIV4 VACC RECOMBINANT DNA IM: CPT | Performed by: FAMILY MEDICINE

## 2022-10-03 PROCEDURE — 90750 HZV VACC RECOMBINANT IM: CPT | Performed by: FAMILY MEDICINE

## 2022-10-03 PROCEDURE — 36415 COLL VENOUS BLD VENIPUNCTURE: CPT | Performed by: FAMILY MEDICINE

## 2022-10-03 PROCEDURE — 90471 IMMUNIZATION ADMIN: CPT | Performed by: FAMILY MEDICINE

## 2022-10-03 PROCEDURE — 87624 HPV HI-RISK TYP POOLED RSLT: CPT | Performed by: FAMILY MEDICINE

## 2022-10-03 PROCEDURE — 90472 IMMUNIZATION ADMIN EACH ADD: CPT | Performed by: FAMILY MEDICINE

## 2022-10-03 PROCEDURE — 99214 OFFICE O/P EST MOD 30 MIN: CPT | Performed by: FAMILY MEDICINE

## 2022-10-03 PROCEDURE — 80053 COMPREHEN METABOLIC PANEL: CPT | Performed by: FAMILY MEDICINE

## 2022-10-03 PROCEDURE — G0145 SCR C/V CYTO,THINLAYER,RESCR: HCPCS | Performed by: FAMILY MEDICINE

## 2022-10-03 PROCEDURE — 80061 LIPID PANEL: CPT | Performed by: FAMILY MEDICINE

## 2022-10-03 PROCEDURE — G0124 SCREEN C/V THIN LAYER BY MD: HCPCS

## 2022-10-03 RX ORDER — MONTELUKAST SODIUM 10 MG/1
10 TABLET ORAL AT BEDTIME
Qty: 90 TABLET | Refills: 3 | Status: SHIPPED | OUTPATIENT
Start: 2022-10-03 | End: 2023-10-24

## 2022-10-03 RX ORDER — AMLODIPINE AND VALSARTAN 5; 320 MG/1; MG/1
1 TABLET ORAL DAILY
Qty: 90 TABLET | Refills: 3 | Status: SHIPPED | OUTPATIENT
Start: 2022-10-03 | End: 2023-09-20

## 2022-10-03 RX ORDER — MOMETASONE FUROATE AND FORMOTEROL FUMARATE DIHYDRATE 100; 5 UG/1; UG/1
2 AEROSOL RESPIRATORY (INHALATION) 2 TIMES DAILY
Qty: 36 G | Refills: 3 | Status: SHIPPED | OUTPATIENT
Start: 2022-10-03 | End: 2023-11-08

## 2022-10-03 RX ORDER — ALBUTEROL SULFATE 90 UG/1
AEROSOL, METERED RESPIRATORY (INHALATION)
Qty: 18 G | Refills: 3 | Status: SHIPPED | OUTPATIENT
Start: 2022-10-03 | End: 2024-05-01

## 2022-10-03 ASSESSMENT — ASTHMA QUESTIONNAIRES
ACT_TOTALSCORE: 25
QUESTION_2 LAST FOUR WEEKS HOW OFTEN HAVE YOU HAD SHORTNESS OF BREATH: NOT AT ALL
QUESTION_4 LAST FOUR WEEKS HOW OFTEN HAVE YOU USED YOUR RESCUE INHALER OR NEBULIZER MEDICATION (SUCH AS ALBUTEROL): NOT AT ALL
QUESTION_1 LAST FOUR WEEKS HOW MUCH OF THE TIME DID YOUR ASTHMA KEEP YOU FROM GETTING AS MUCH DONE AT WORK, SCHOOL OR AT HOME: NONE OF THE TIME
ACT_TOTALSCORE: 25
QUESTION_3 LAST FOUR WEEKS HOW OFTEN DID YOUR ASTHMA SYMPTOMS (WHEEZING, COUGHING, SHORTNESS OF BREATH, CHEST TIGHTNESS OR PAIN) WAKE YOU UP AT NIGHT OR EARLIER THAN USUAL IN THE MORNING: NOT AT ALL
QUESTION_5 LAST FOUR WEEKS HOW WOULD YOU RATE YOUR ASTHMA CONTROL: COMPLETELY CONTROLLED

## 2022-10-03 ASSESSMENT — PAIN SCALES - GENERAL: PAINLEVEL: NO PAIN (0)

## 2022-10-03 NOTE — PROGRESS NOTES
"  Assessment & Plan     Moderate persistent asthma without complication  Stable - continue current treatment  - albuterol (VENTOLIN HFA) 108 (90 Base) MCG/ACT inhaler; Take 1 - 2 puffs every 4 hours as needed for cough/wheezing.  - mometasone-formoterol (DULERA) 100-5 MCG/ACT inhaler; Inhale 2 puffs into the lungs 2 times daily  - montelukast (SINGULAIR) 10 MG tablet; Take 1 tablet (10 mg) by mouth At Bedtime    Hypertension goal BP (blood pressure) < 140/90  Controlled - continue current treatment  - COMPREHENSIVE METABOLIC PANEL; Future  - Lipid panel reflex to direct LDL Non-fasting; Future  - amLODIPine-valsartan (EXFORGE) 5-320 MG tablet; Take 1 tablet by mouth daily    Recurrent cold sores  Stable - check labs  - COMPREHENSIVE METABOLIC PANEL; Future    Cervical cancer screening  Recheck  - Pap Screen with HPV - recommended age 30 - 65 years    Need for shingles vaccine    - ZOSTER VACCINE RECOMBINANT ADJUVANTED (SHINGRIX)    Need for prophylactic vaccination and inoculation against influenza    - INFLUENZA QUAD, RECOMBINANT, P-FREE (RIV4) (FLUBLOK) AGE 50-64 [WWR921]       BMI:   Estimated body mass index is 26.43 kg/m  as calculated from the following:    Height as of this encounter: 1.626 m (5' 4\").    Weight as of this encounter: 69.9 kg (154 lb).       The uses and side effects, including black box warnings as appropriate, were discussed in detail.  All patient questions were answered.  The patient was instructed to call immediately if any side effects developed.     Return in about 1 year (around 10/3/2023).    Lynda Painting MD  [employer] RIANNA Espinal is a 57 year old, presenting for the following health issues:  Gyn Exam and Medication Refill (All medications )      History of Present Illness       Reason for visit:  Papsmear, prescription refill authorizations    She eats 0-1 servings of fruits and vegetables daily.She consumes 1 sweetened beverage(s) " "daily.She exercises with enough effort to increase her heart rate 30 to 60 minutes per day.  She exercises with enough effort to increase her heart rate 3 or less days per week.   She is taking medications regularly.       Hypertension Follow-up      Do you check your blood pressure regularly outside of the clinic? No     Are you following a low salt diet? Yes    Are your blood pressures ever more than 140 on the top number (systolic) OR more   than 90 on the bottom number (diastolic), for example 140/90? Yes        Review of Systems   Constitutional, HEENT, cardiovascular, pulmonary, gi and gu systems are negative, except as otherwise noted.      Objective    /88   Pulse 93   Temp 97.3  F (36.3  C) (Oral)   Resp 20   Ht 1.626 m (5' 4\")   Wt 69.9 kg (154 lb)   LMP  (LMP Unknown)   SpO2 100%   BMI 26.43 kg/m    Body mass index is 26.43 kg/m .  Physical Exam   GENERAL: healthy, alert and no distress  NECK: no adenopathy, no asymmetry, masses, or scars and thyroid normal to palpation  RESP: lungs clear to auscultation - no rales, rhonchi or wheezes  CV: regular rate and rhythm, normal S1 S2, no S3 or S4, no murmur, click or rub, no peripheral edema and peripheral pulses strong  ABDOMEN: soft, nontender, no hepatosplenomegaly, no masses and bowel sounds normal   (female): normal female external genitalia, normal urethral meatus , vaginal mucosal atrophy and normal cervix, adnexae, and uterus without masses.  MS: no gross musculoskeletal defects noted, no edema              "

## 2022-10-03 NOTE — NURSING NOTE
Prior to immunization administration, verified patients identity using patient s name and date of birth. Please see Immunization Activity for additional information.     Screening Questionnaire for Adult Immunization    Are you sick today?   No   Do you have allergies to medications, food, a vaccine component or latex?   No   Have you ever had a serious reaction after receiving a vaccination?   No   Do you have a long-term health problem with heart, lung, kidney, or metabolic disease (e.g., diabetes), asthma, a blood disorder, no spleen, complement component deficiency, a cochlear implant, or a spinal fluid leak?  Are you on long-term aspirin therapy?   No   Do you have cancer, leukemia, HIV/AIDS, or any other immune system problem?   No   Do you have a parent, brother, or sister with an immune system problem?   No   In the past 3 months, have you taken medications that affect  your immune system, such as prednisone, other steroids, or anticancer drugs; drugs for the treatment of rheumatoid arthritis, Crohn s disease, or psoriasis; or have you had radiation treatments?   No   Have you had a seizure, or a brain or other nervous system problem?   No   During the past year, have you received a transfusion of blood or blood    products, or been given immune (gamma) globulin or antiviral drug?   No   For women: Are you pregnant or is there a chance you could become       pregnant during the next month?   No   Have you received any vaccinations in the past 4 weeks?   No     Immunization questionnaire answers were all negative.         Patient instructed to remain in clinic for 15 minutes afterwards, and to report any adverse reaction to me immediately.       Screening performed by Abraham Martin MA on 10/3/2022 at 5:14 PM.

## 2022-10-03 NOTE — PATIENT INSTRUCTIONS
At [facility], we strive to deliver an exceptional experience to you, every time we see you. If you receive a survey, please complete it as we do value your feedback.  If you have MyChart, you can expect to receive results automatically within 24 hours of their completion.  Your provider will send a note interpreting your results as well.   If you do not have MyChart, you should receive your results in about a week by mail.    Your care team:                            Family Medicine Internal Medicine   MD Shimon Blevins MD Shantel Branch-Fleming, MD Srinivasa Vaka, MD Katya Belousova, WALESKA Davis CNP, MD ([facility]   Ray Mcgarry, MD Sveta Brooks MD Amelia Massimini APRN CNP Kim Thein, APRN CNP Bethany Templen, MD             Clinic hours: Monday - Thursday 7 am-6 pm; Fridays 7 am-5 pm.   Urgent care: Monday - Friday 10 am- 8 pm; Saturday and Sunday 9 am-5 pm.    Clinic: [phone]       [facility]: Monday - Thursday 8 am - 7 pm; Friday 8 am - 6 pm  [facility]: [phone]

## 2022-10-04 LAB
ALBUMIN SERPL-MCNC: 3.6 G/DL (ref 3.4–5)
ALP SERPL-CCNC: 153 U/L (ref 40–150)
ALT SERPL W P-5'-P-CCNC: 23 U/L (ref 0–50)
ANION GAP SERPL CALCULATED.3IONS-SCNC: 5 MMOL/L (ref 3–14)
AST SERPL W P-5'-P-CCNC: 26 U/L (ref 0–45)
BILIRUB SERPL-MCNC: 0.4 MG/DL (ref 0.2–1.3)
BUN SERPL-MCNC: 13 MG/DL (ref 7–30)
CALCIUM SERPL-MCNC: 8.6 MG/DL (ref 8.5–10.1)
CHLORIDE BLD-SCNC: 103 MMOL/L (ref 94–109)
CHOLEST SERPL-MCNC: 192 MG/DL
CO2 SERPL-SCNC: 29 MMOL/L (ref 20–32)
CREAT SERPL-MCNC: 0.89 MG/DL (ref 0.52–1.04)
FASTING STATUS PATIENT QL REPORTED: NO
GFR SERPL CREATININE-BSD FRML MDRD: 76 ML/MIN/1.73M2
GLUCOSE BLD-MCNC: 142 MG/DL (ref 70–99)
HDLC SERPL-MCNC: 101 MG/DL
LDLC SERPL CALC-MCNC: 53 MG/DL
NONHDLC SERPL-MCNC: 91 MG/DL
POTASSIUM BLD-SCNC: 3.9 MMOL/L (ref 3.4–5.3)
PROT SERPL-MCNC: 7 G/DL (ref 6.8–8.8)
SODIUM SERPL-SCNC: 137 MMOL/L (ref 133–144)
TRIGL SERPL-MCNC: 192 MG/DL

## 2022-10-07 LAB
BKR LAB AP GYN ADEQUACY: ABNORMAL
BKR LAB AP GYN INTERPRETATION: ABNORMAL
BKR LAB AP GYN OTHER FINDINGS: ABNORMAL
BKR LAB AP HPV REFLEX: ABNORMAL
BKR LAB AP PREVIOUS ABNL DX: ABNORMAL
BKR LAB AP PREVIOUS ABNORMAL: ABNORMAL
PATH REPORT.COMMENTS IMP SPEC: ABNORMAL
PATH REPORT.COMMENTS IMP SPEC: ABNORMAL
PATH REPORT.RELEVANT HX SPEC: ABNORMAL

## 2022-10-10 LAB
HUMAN PAPILLOMA VIRUS 16 DNA: NEGATIVE
HUMAN PAPILLOMA VIRUS 18 DNA: NEGATIVE
HUMAN PAPILLOMA VIRUS FINAL DIAGNOSIS: ABNORMAL
HUMAN PAPILLOMA VIRUS OTHER HR: POSITIVE

## 2022-10-11 NOTE — RESULT ENCOUNTER NOTE
Ms. Arellano,    One of your liver enzymes is a little high. I would recommend you have blood drawn to help us determine why this is.  The lab test has been ordered.  Your other labs are stable for you.    Please contact the clinic if you have additional questions.  Thank you.    Sincerely,    Lynda Painting MD

## 2022-10-12 ENCOUNTER — PATIENT OUTREACH (OUTPATIENT)
Dept: FAMILY MEDICINE | Facility: CLINIC | Age: 57
End: 2022-10-12

## 2022-10-12 DIAGNOSIS — Z98.890 S/P LEEP (STATUS POST LOOP ELECTROSURGICAL EXCISION PROCEDURE): ICD-10-CM

## 2022-10-14 DIAGNOSIS — J30.89 CHRONIC NON-SEASONAL ALLERGIC RHINITIS: ICD-10-CM

## 2022-10-14 RX ORDER — LEVOCETIRIZINE DIHYDROCHLORIDE 5 MG/1
10 TABLET, FILM COATED ORAL EVERY EVENING
Qty: 180 TABLET | Refills: 3 | Status: SHIPPED | OUTPATIENT
Start: 2022-10-14 | End: 2023-10-17

## 2022-11-11 ENCOUNTER — E-VISIT (OUTPATIENT)
Dept: FAMILY MEDICINE | Facility: CLINIC | Age: 57
End: 2022-11-11
Payer: COMMERCIAL

## 2022-11-11 DIAGNOSIS — J20.9 ACUTE BRONCHITIS WITH SYMPTOMS > 10 DAYS: ICD-10-CM

## 2022-11-11 DIAGNOSIS — J45.41 MODERATE PERSISTENT ASTHMA WITH ACUTE EXACERBATION: Primary | ICD-10-CM

## 2022-11-11 PROBLEM — M75.41 SHOULDER IMPINGEMENT SYNDROME, RIGHT: Status: RESOLVED | Noted: 2018-09-18 | Resolved: 2022-11-11

## 2022-11-11 PROCEDURE — 99421 OL DIG E/M SVC 5-10 MIN: CPT | Performed by: FAMILY MEDICINE

## 2022-11-11 RX ORDER — PREDNISONE 20 MG/1
40 TABLET ORAL DAILY
Qty: 10 TABLET | Refills: 0 | Status: SHIPPED | OUTPATIENT
Start: 2022-11-11 | End: 2022-11-16

## 2022-11-15 RX ORDER — DOXYCYCLINE 100 MG/1
100 CAPSULE ORAL 2 TIMES DAILY
Qty: 14 CAPSULE | Refills: 0 | Status: SHIPPED | OUTPATIENT
Start: 2022-11-15 | End: 2022-11-22

## 2022-12-05 ENCOUNTER — TELEPHONE (OUTPATIENT)
Dept: FAMILY MEDICINE | Facility: CLINIC | Age: 57
End: 2022-12-05

## 2022-12-05 NOTE — TELEPHONE ENCOUNTER
Patient Quality Outreach    Patient is due for the following:   Breast Cancer Screening - Mammogram    Next Steps:   Schedule a office visit for mammogram    Type of outreach:    Phone, left message for patient/parent to call back.    Next Steps:  Reach out within 90 days via [employer].    Max number of attempts reached: Yes. Will try again in 90 days if patient still on fail list.    Questions for provider review:    None     Mag Chang

## 2023-03-08 ENCOUNTER — TELEPHONE (OUTPATIENT)
Dept: FAMILY MEDICINE | Facility: CLINIC | Age: 58
End: 2023-03-08
Payer: COMMERCIAL

## 2023-03-08 NOTE — TELEPHONE ENCOUNTER
Patient Quality Outreach    Patient is due for the following:   Breast Cancer Screening - Mammogram    Next Steps:   Schedule a office visit for mammogram    Type of outreach:    Phone, left message for patient/parent to call back.    Next Steps:  Reach out within 90 days via Phone.    Max number of attempts reached: Yes. Will try again in 90 days if patient still on fail list.    Questions for provider review:    None     Mag Chang

## 2023-03-17 ENCOUNTER — PATIENT OUTREACH (OUTPATIENT)
Dept: FAMILY MEDICINE | Facility: CLINIC | Age: 58
End: 2023-03-17
Payer: COMMERCIAL

## 2023-03-17 NOTE — TELEPHONE ENCOUNTER
10/3/22 LSIL pap, + HR HPV (not 16 or 18). Plan: colp bef 1/3/23  10/13/22 left msg and sent my chart result note. Seen by patient Kylie Arellano on 10/13/2022  3:09 PM  12/2/22 Reminder Mychart (2mo)  12/30/22 [place] not done. Tracking updated for 6 mo colp/pap due 4/3/23.   3/17/23 Reminder (6mo) levit

## 2023-05-16 NOTE — TELEPHONE ENCOUNTER
FYI to provider - Patient is lost to pap tracking follow-up. Attempts to contact pt have been made per reminder process and there has been no reply and/or no appt scheduled. Contact hx listed below.     8/23/10:pap HSIL, plan [facility]  10/2010:[facility] АЛЕКСАНДР 2-3, plan LEEP  10/2010:LEEP  2/10/2011: pap ASC-US HPV negative plan to repeat pap in 3-4 months.   12/7/11 pap NIL/negative HPV. Plan--Yearly pap's if this is normal. (due 12/2012)   12/31/12 pap NIL. Plan-- yearly pap  1/6/14 pap NIL  1/7/15 pap NIL/neg HPV. Plan-- pap and HPV cotest 1 year  3/7/16 pap NIL/ + HR HPV other. Plan: Colposcopy.   4/18/16 [facility]: ASCUS. Plan: cotest in 6 mo.   4/19/17: NIL Pap, + HR HPV (Neg 16/18). Plan cotest in 1 year.   11/12/18 NIL pap, + HR HPV (not 16 or 18). Plan colp.   6/18/19 Lost to follow-up for pap tracking  4/7/21 LSIL pap, + HR HPV. Plan: colp.  5/26/21 [facility] bx: АЛЕКСАНДР 1. Plan: Cotest in 1 yr.  7/11/22 Lost to follow up  10/3/22 LSIL pap, + HR HPV (not 16 or 18). Plan: colp bef 1/3/23  10/13/22 left msg and sent my chart result note. Seen by patient Kylie Arellano on 10/13/2022  3:09 PM  12/2/22 Reminder Mychart (2mo)  12/30/22 [place] not done. Tracking updated for 6 mo colp/pap due 4/3/23.   3/17/23 Reminder (6mo) mychart  4/17/23 Reminder (6mo) call - lm  5/16/23 Lost to follow-up for pap tracking   Good medication response to Plavix and borderline to ASA , but some lower than I would like on plavix (P2Y12= 22). Repeat lab on Monday please. Advise patient. Lab is entered.

## 2023-06-17 ENCOUNTER — HEALTH MAINTENANCE LETTER (OUTPATIENT)
Age: 58
End: 2023-06-17

## 2023-07-13 ENCOUNTER — TELEPHONE (OUTPATIENT)
Dept: FAMILY MEDICINE | Facility: CLINIC | Age: 58
End: 2023-07-13
Payer: COMMERCIAL

## 2023-07-13 NOTE — TELEPHONE ENCOUNTER
Patient Quality Outreach    Patient is due for the following:   Diabetes -  Eye Exam  Asthma  -  ACT needed  Breast Cancer Screening - Mammogram  Cervical Cancer Screening - PAP Needed  Physical Preventive Adult Physical      Topic Date Due     Pneumococcal Vaccine (1 - PCV) Never done     COVID-19 Vaccine (3 - Pfizer series) 07/28/2021       Next Steps:   Schedule a Adult Preventative    Type of outreach:    Sent [employer] message.    Next Steps:  Reach out within 90 days via [employer].    Max number of attempts reached: Yes. Will try again in 90 days if patient still on fail list.    Questions for provider review:    None           Michael Garner MA  Chart routed to Care Team.

## 2023-08-09 DIAGNOSIS — K21.9 GASTROESOPHAGEAL REFLUX DISEASE WITHOUT ESOPHAGITIS: ICD-10-CM

## 2023-08-16 ENCOUNTER — MYC MEDICAL ADVICE (OUTPATIENT)
Dept: FAMILY MEDICINE | Facility: CLINIC | Age: 58
End: 2023-08-16

## 2023-08-16 ENCOUNTER — E-VISIT (OUTPATIENT)
Dept: FAMILY MEDICINE | Facility: CLINIC | Age: 58
End: 2023-08-16
Payer: COMMERCIAL

## 2023-08-16 DIAGNOSIS — J45.41 MODERATE PERSISTENT ASTHMA WITH ACUTE EXACERBATION: ICD-10-CM

## 2023-08-16 DIAGNOSIS — J45.40 MODERATE PERSISTENT ASTHMA WITHOUT COMPLICATION: ICD-10-CM

## 2023-08-16 PROCEDURE — 99421 OL DIG E/M SVC 5-10 MIN: CPT | Performed by: FAMILY MEDICINE

## 2023-08-16 RX ORDER — PREDNISONE 20 MG/1
40 TABLET ORAL DAILY
Qty: 10 TABLET | Refills: 0 | Status: SHIPPED | OUTPATIENT
Start: 2023-08-16 | End: 2023-08-21

## 2023-08-16 RX ORDER — IPRATROPIUM BROMIDE AND ALBUTEROL SULFATE 2.5; .5 MG/3ML; MG/3ML
1 SOLUTION RESPIRATORY (INHALATION) EVERY 4 HOURS PRN
Qty: 90 ML | Refills: 0 | Status: SHIPPED | OUTPATIENT
Start: 2023-08-16 | End: 2024-05-06

## 2023-09-20 ENCOUNTER — MYC REFILL (OUTPATIENT)
Dept: FAMILY MEDICINE | Facility: CLINIC | Age: 58
End: 2023-09-20
Payer: COMMERCIAL

## 2023-09-20 DIAGNOSIS — K21.9 GASTROESOPHAGEAL REFLUX DISEASE WITHOUT ESOPHAGITIS: ICD-10-CM

## 2023-09-20 DIAGNOSIS — I10 HYPERTENSION GOAL BP (BLOOD PRESSURE) < 140/90: ICD-10-CM

## 2023-09-20 RX ORDER — AMLODIPINE AND VALSARTAN 5; 320 MG/1; MG/1
1 TABLET ORAL DAILY
Qty: 90 TABLET | Refills: 0 | Status: SHIPPED | OUTPATIENT
Start: 2023-09-20 | End: 2024-01-08

## 2023-09-22 DIAGNOSIS — B00.1 RECURRENT COLD SORES: ICD-10-CM

## 2023-09-22 DIAGNOSIS — J30.89 CHRONIC NON-SEASONAL ALLERGIC RHINITIS: ICD-10-CM

## 2023-09-22 RX ORDER — VALACYCLOVIR HYDROCHLORIDE 500 MG/1
TABLET, FILM COATED ORAL
Qty: 135 TABLET | Refills: 0 | Status: SHIPPED | OUTPATIENT
Start: 2023-09-22 | End: 2024-04-19

## 2023-09-22 RX ORDER — FLUTICASONE PROPIONATE 50 MCG
SPRAY, SUSPENSION (ML) NASAL
Qty: 48 G | Refills: 0 | Status: SHIPPED | OUTPATIENT
Start: 2023-09-22 | End: 2024-01-23

## 2023-10-15 DIAGNOSIS — J30.89 CHRONIC NON-SEASONAL ALLERGIC RHINITIS: ICD-10-CM

## 2023-10-17 RX ORDER — LEVOCETIRIZINE DIHYDROCHLORIDE 5 MG/1
10 TABLET, FILM COATED ORAL EVERY EVENING
Qty: 180 TABLET | Refills: 2 | Status: SHIPPED | OUTPATIENT
Start: 2023-10-17 | End: 2024-07-22

## 2023-10-24 DIAGNOSIS — J45.40 MODERATE PERSISTENT ASTHMA WITHOUT COMPLICATION: ICD-10-CM

## 2023-10-24 RX ORDER — MONTELUKAST SODIUM 10 MG/1
10 TABLET ORAL AT BEDTIME
Qty: 90 TABLET | Refills: 0 | Status: SHIPPED | OUTPATIENT
Start: 2023-10-24 | End: 2024-01-08

## 2023-11-08 DIAGNOSIS — J45.40 MODERATE PERSISTENT ASTHMA WITHOUT COMPLICATION: ICD-10-CM

## 2023-11-08 RX ORDER — MOMETASONE FUROATE AND FORMOTEROL FUMARATE DIHYDRATE 100; 5 UG/1; UG/1
2 AEROSOL RESPIRATORY (INHALATION) 2 TIMES DAILY
Qty: 39 G | Refills: 0 | Status: SHIPPED | OUTPATIENT
Start: 2023-11-08 | End: 2024-05-01

## 2023-11-09 ENCOUNTER — TELEPHONE (OUTPATIENT)
Dept: FAMILY MEDICINE | Facility: CLINIC | Age: 58
End: 2023-11-09
Payer: COMMERCIAL

## 2023-11-09 NOTE — TELEPHONE ENCOUNTER
Patient Quality Outreach    Patient is due for the following:   Colon Cancer Screening  Breast Cancer Screening - Mammogram  Physical Preventive Adult Physical    Next Steps:   Patient has upcoming appointment, these items will be addressed at that time.    Type of outreach:    Sent [employer] message.    Next Steps:  Reach out within 90 days via [employer].    Max number of attempts reached: Yes. Will try again in 90 days if patient still on fail list.    Questions for provider review:    None           Michael Garner MA  Chart routed to Provider.     
no

## 2023-12-21 ENCOUNTER — MYC REFILL (OUTPATIENT)
Dept: FAMILY MEDICINE | Facility: CLINIC | Age: 58
End: 2023-12-21
Payer: COMMERCIAL

## 2023-12-21 DIAGNOSIS — L20.84 INTRINSIC ATOPIC DERMATITIS: ICD-10-CM

## 2023-12-21 RX ORDER — DESONIDE 0.5 MG/G
CREAM TOPICAL
Qty: 60 G | Refills: 2 | Status: SHIPPED | OUTPATIENT
Start: 2023-12-21 | End: 2024-05-18

## 2024-01-06 DIAGNOSIS — I10 HYPERTENSION GOAL BP (BLOOD PRESSURE) < 140/90: ICD-10-CM

## 2024-01-06 DIAGNOSIS — K21.9 GASTROESOPHAGEAL REFLUX DISEASE WITHOUT ESOPHAGITIS: ICD-10-CM

## 2024-01-06 DIAGNOSIS — J45.40 MODERATE PERSISTENT ASTHMA WITHOUT COMPLICATION: ICD-10-CM

## 2024-01-08 RX ORDER — AMLODIPINE AND VALSARTAN 5; 320 MG/1; MG/1
TABLET ORAL
Qty: 90 TABLET | Refills: 0 | Status: SHIPPED | OUTPATIENT
Start: 2024-01-08 | End: 2024-04-15

## 2024-01-08 RX ORDER — MONTELUKAST SODIUM 10 MG/1
10 TABLET ORAL AT BEDTIME
Qty: 90 TABLET | Refills: 0 | Status: SHIPPED | OUTPATIENT
Start: 2024-01-08 | End: 2024-04-15

## 2024-01-23 DIAGNOSIS — J30.89 CHRONIC NON-SEASONAL ALLERGIC RHINITIS: ICD-10-CM

## 2024-01-23 RX ORDER — FLUTICASONE PROPIONATE 50 MCG
SPRAY, SUSPENSION (ML) NASAL
Qty: 48 G | Refills: 1 | Status: SHIPPED | OUTPATIENT
Start: 2024-01-23 | End: 2024-07-22

## 2024-03-04 ENCOUNTER — TELEPHONE (OUTPATIENT)
Dept: FAMILY MEDICINE | Facility: CLINIC | Age: 59
End: 2024-03-04
Payer: COMMERCIAL

## 2024-03-04 NOTE — TELEPHONE ENCOUNTER
Patient Quality Outreach    Patient is due for the following:   Asthma  -  ACT needed  Cervical Cancer Screening - PAP Needed  Depression  -  PHQ-9 needed  Physical Preventive Adult Physical    Next Steps:   Schedule a Adult Preventative    Type of outreach:    Sent [employer] message.    Next Steps:  Reach out within 90 days via [employer].    Max number of attempts reached: Yes. Will try again in 90 days if patient still on fail list.    Questions for provider review:    None           Luz Velasco MA

## 2024-04-14 DIAGNOSIS — J45.40 MODERATE PERSISTENT ASTHMA WITHOUT COMPLICATION: ICD-10-CM

## 2024-04-14 DIAGNOSIS — I10 HYPERTENSION GOAL BP (BLOOD PRESSURE) < 140/90: ICD-10-CM

## 2024-04-14 DIAGNOSIS — K21.9 GASTROESOPHAGEAL REFLUX DISEASE WITHOUT ESOPHAGITIS: ICD-10-CM

## 2024-04-15 RX ORDER — MONTELUKAST SODIUM 10 MG/1
10 TABLET ORAL AT BEDTIME
Qty: 30 TABLET | Refills: 0 | Status: SHIPPED | OUTPATIENT
Start: 2024-04-15 | End: 2024-05-06

## 2024-04-15 RX ORDER — AMLODIPINE AND VALSARTAN 5; 320 MG/1; MG/1
TABLET ORAL
Qty: 30 TABLET | Refills: 0 | Status: SHIPPED | OUTPATIENT
Start: 2024-04-15 | End: 2024-05-06

## 2024-04-19 DIAGNOSIS — B00.1 RECURRENT COLD SORES: ICD-10-CM

## 2024-04-19 RX ORDER — VALACYCLOVIR HYDROCHLORIDE 500 MG/1
TABLET, FILM COATED ORAL
Qty: 135 TABLET | Refills: 0 | Status: SHIPPED | OUTPATIENT
Start: 2024-04-19 | End: 2024-05-06

## 2024-05-01 ENCOUNTER — MYC REFILL (OUTPATIENT)
Dept: FAMILY MEDICINE | Facility: CLINIC | Age: 59
End: 2024-05-01
Payer: COMMERCIAL

## 2024-05-01 DIAGNOSIS — J45.40 MODERATE PERSISTENT ASTHMA WITHOUT COMPLICATION: ICD-10-CM

## 2024-05-02 RX ORDER — MOMETASONE FUROATE AND FORMOTEROL FUMARATE DIHYDRATE 100; 5 UG/1; UG/1
2 AEROSOL RESPIRATORY (INHALATION) 2 TIMES DAILY
Qty: 39 G | Refills: 0 | Status: SHIPPED | OUTPATIENT
Start: 2024-05-02 | End: 2024-05-06

## 2024-05-02 RX ORDER — ALBUTEROL SULFATE 90 UG/1
AEROSOL, METERED RESPIRATORY (INHALATION)
Qty: 18 G | Refills: 0 | Status: SHIPPED | OUTPATIENT
Start: 2024-05-02 | End: 2024-05-06

## 2024-05-02 SDOH — HEALTH STABILITY: PHYSICAL HEALTH: ON AVERAGE, HOW MANY DAYS PER WEEK DO YOU ENGAGE IN MODERATE TO STRENUOUS EXERCISE (LIKE A BRISK WALK)?: 1 DAY

## 2024-05-02 SDOH — HEALTH STABILITY: PHYSICAL HEALTH: ON AVERAGE, HOW MANY MINUTES DO YOU ENGAGE IN EXERCISE AT THIS LEVEL?: 30 MIN

## 2024-05-02 ASSESSMENT — SOCIAL DETERMINANTS OF HEALTH (SDOH): HOW OFTEN DO YOU GET TOGETHER WITH FRIENDS OR RELATIVES?: MORE THAN THREE TIMES A WEEK

## 2024-05-06 ENCOUNTER — OFFICE VISIT (OUTPATIENT)
Dept: FAMILY MEDICINE | Facility: CLINIC | Age: 59
End: 2024-05-06
Payer: COMMERCIAL

## 2024-05-06 VITALS
SYSTOLIC BLOOD PRESSURE: 138 MMHG | OXYGEN SATURATION: 98 % | DIASTOLIC BLOOD PRESSURE: 87 MMHG | HEIGHT: 64 IN | RESPIRATION RATE: 18 BRPM | TEMPERATURE: 98.6 F | WEIGHT: 165 LBS | HEART RATE: 95 BPM | BODY MASS INDEX: 28.17 KG/M2

## 2024-05-06 DIAGNOSIS — Z12.4 CERVICAL CANCER SCREENING: ICD-10-CM

## 2024-05-06 DIAGNOSIS — Z78.9 HEPATITIS B VACCINATION STATUS UNKNOWN: ICD-10-CM

## 2024-05-06 DIAGNOSIS — I10 HYPERTENSION GOAL BP (BLOOD PRESSURE) < 140/90: ICD-10-CM

## 2024-05-06 DIAGNOSIS — K21.9 GASTROESOPHAGEAL REFLUX DISEASE WITHOUT ESOPHAGITIS: ICD-10-CM

## 2024-05-06 DIAGNOSIS — N95.1 MENOPAUSAL SYNDROME (HOT FLASHES): ICD-10-CM

## 2024-05-06 DIAGNOSIS — J45.41 MODERATE PERSISTENT ASTHMA WITH ACUTE EXACERBATION: ICD-10-CM

## 2024-05-06 DIAGNOSIS — B00.1 RECURRENT COLD SORES: ICD-10-CM

## 2024-05-06 DIAGNOSIS — R06.81 APNEA: ICD-10-CM

## 2024-05-06 DIAGNOSIS — J45.40 MODERATE PERSISTENT ASTHMA WITHOUT COMPLICATION: ICD-10-CM

## 2024-05-06 DIAGNOSIS — Z00.00 ROUTINE GENERAL MEDICAL EXAMINATION AT A HEALTH CARE FACILITY: Primary | ICD-10-CM

## 2024-05-06 PROCEDURE — 80053 COMPREHEN METABOLIC PANEL: CPT | Performed by: FAMILY MEDICINE

## 2024-05-06 PROCEDURE — 36415 COLL VENOUS BLD VENIPUNCTURE: CPT | Performed by: FAMILY MEDICINE

## 2024-05-06 PROCEDURE — 90480 ADMN SARSCOV2 VAC 1/ONLY CMP: CPT | Performed by: FAMILY MEDICINE

## 2024-05-06 PROCEDURE — 90471 IMMUNIZATION ADMIN: CPT | Performed by: FAMILY MEDICINE

## 2024-05-06 PROCEDURE — 86706 HEP B SURFACE ANTIBODY: CPT | Performed by: FAMILY MEDICINE

## 2024-05-06 PROCEDURE — 87624 HPV HI-RISK TYP POOLED RSLT: CPT | Performed by: FAMILY MEDICINE

## 2024-05-06 PROCEDURE — G0145 SCR C/V CYTO,THINLAYER,RESCR: HCPCS | Performed by: FAMILY MEDICINE

## 2024-05-06 PROCEDURE — 99396 PREV VISIT EST AGE 40-64: CPT | Mod: 25 | Performed by: FAMILY MEDICINE

## 2024-05-06 PROCEDURE — 99214 OFFICE O/P EST MOD 30 MIN: CPT | Mod: 25 | Performed by: FAMILY MEDICINE

## 2024-05-06 PROCEDURE — 90677 PCV20 VACCINE IM: CPT | Performed by: FAMILY MEDICINE

## 2024-05-06 PROCEDURE — 91320 SARSCV2 VAC 30MCG TRS-SUC IM: CPT | Performed by: FAMILY MEDICINE

## 2024-05-06 RX ORDER — PROGESTERONE 100 MG/1
100 CAPSULE ORAL DAILY
Qty: 90 CAPSULE | Refills: 1 | Status: SHIPPED | OUTPATIENT
Start: 2024-05-06

## 2024-05-06 RX ORDER — ESTRADIOL 0.5 MG/1
0.5 TABLET ORAL DAILY
Qty: 90 TABLET | Refills: 1 | Status: SHIPPED | OUTPATIENT
Start: 2024-05-06

## 2024-05-06 RX ORDER — MOMETASONE FUROATE AND FORMOTEROL FUMARATE DIHYDRATE 100; 5 UG/1; UG/1
2 AEROSOL RESPIRATORY (INHALATION) 2 TIMES DAILY
Qty: 39 G | Refills: 1 | Status: SHIPPED | OUTPATIENT
Start: 2024-05-06

## 2024-05-06 RX ORDER — ALBUTEROL SULFATE 90 UG/1
AEROSOL, METERED RESPIRATORY (INHALATION)
Qty: 18 G | Refills: 1 | Status: SHIPPED | OUTPATIENT
Start: 2024-05-06

## 2024-05-06 RX ORDER — AMLODIPINE AND VALSARTAN 5; 320 MG/1; MG/1
1 TABLET ORAL DAILY
Qty: 90 TABLET | Refills: 3 | Status: SHIPPED | OUTPATIENT
Start: 2024-05-06

## 2024-05-06 RX ORDER — MONTELUKAST SODIUM 10 MG/1
10 TABLET ORAL AT BEDTIME
Qty: 90 TABLET | Refills: 3 | Status: SHIPPED | OUTPATIENT
Start: 2024-05-06

## 2024-05-06 RX ORDER — IPRATROPIUM BROMIDE AND ALBUTEROL SULFATE 2.5; .5 MG/3ML; MG/3ML
1 SOLUTION RESPIRATORY (INHALATION) EVERY 4 HOURS PRN
Qty: 90 ML | Refills: 0 | Status: SHIPPED | OUTPATIENT
Start: 2024-05-06

## 2024-05-06 RX ORDER — VALACYCLOVIR HYDROCHLORIDE 500 MG/1
TABLET, FILM COATED ORAL
Qty: 135 TABLET | Refills: 3 | Status: SHIPPED | OUTPATIENT
Start: 2024-05-06

## 2024-05-06 ASSESSMENT — PAIN SCALES - GENERAL: PAINLEVEL: NO PAIN (0)

## 2024-05-06 NOTE — PROGRESS NOTES
Preventive Care Visit  [facility] PENNY Painting MD, Family Medicine  May 6, 2024      Assessment & Plan     Routine general medical examination at a health care facility  Routine preventive reviewed    Hypertension goal BP (blood pressure) < 140/90  Controlled - continue current treatment and check labs.  - COMPREHENSIVE METABOLIC PANEL; Future  - amLODIPine-valsartan (EXFORGE) 5-320 MG tablet; Take 1 tablet by mouth daily  - COMPREHENSIVE METABOLIC PANEL    Cervical cancer screening  screening  - Pap Screen with HPV - recommended age 30 - 65 years    Gastroesophageal reflux disease without esophagitis  Stable - continue current treatment  - omeprazole (PRILOSEC) 20 MG DR capsule; TAKE 1 CAPSULE (20 MG) BY MOUTH DAILY, 30 - 60 MINUTES BEFORE FIRST MEAL OF THE DAY.    Moderate persistent asthma with acute exacerbation  Stable - continue current treatment  - ipratropium - albuterol 0.5 mg/2.5 mg/3 mL (DUONEB) 0.5-2.5 (3) MG/3ML neb solution; Take 1 vial (3 mLs) by nebulization every 4 hours as needed for shortness of breath or wheezing    Moderate persistent asthma without complication  Stable - continue current treatment  - albuterol (VENTOLIN HFA) 108 (90 Base) MCG/ACT inhaler; Take 1 - 2 puffs every 4 hours as needed for cough/wheezing.  - mometasone-formoterol (DULERA) 100-5 MCG/ACT inhaler; Inhale 2 puffs into the lungs 2 times daily  - montelukast (SINGULAIR) 10 MG tablet; Take 1 tablet (10 mg) by mouth at bedtime    Recurrent cold sores  Stable - continue current treatment  - valACYclovir (VALTREX) 500 MG tablet; TAKE 1 TABLET BY MOUTH EVERY DAY AND INCREASE TO 2 TABS DAILY FOR 10 DAYS WITH OUTBREAKS    Apnea  Referral for evaluation given witness apnea by significant other  - Adult Sleep Eval & Management  Referral; Future    Hepatitis B vaccination status unknown  Needed for work  - Hepatitis B Surface Antibody; Future  - Hepatitis B Surface  "Antibody    Menopausal syndrome (hot flashes)  New - need mammogram ASAP.  Start hormone replacement given hot flashes and night sweats impacting sleep and daily activities.  - estradiol (ESTRACE) 0.5 MG tablet; Take 1 tablet (0.5 mg) by mouth daily  - progesterone (PROMETRIUM) 100 MG capsule; Take 1 capsule (100 mg) by mouth daily        The uses and side effects, including black box warnings as appropriate, were discussed in detail.  All patient questions were answered.  The patient was instructed to call immediately if any side effects developed.       BMI  Estimated body mass index is 28.32 kg/m  as calculated from the following:    Height as of this encounter: 1.626 m (5' 4\").    Weight as of this encounter: 74.8 kg (165 lb).   Weight management plan: Discussed healthy diet and exercise guidelines    Counseling  Appropriate preventive services were discussed with this patient, including applicable screening as appropriate for fall prevention, nutrition, physical activity, Tobacco-use cessation, weight loss and cognition.  Checklist reviewing preventive services available has been given to the patient.  Reviewed patient's diet, addressing concerns and/or questions.   She is at risk for lack of exercise and has been provided with information to increase physical activity for the benefit of her well-being.       Follow up in 3 months.    Sharla Espinal is a 59 year old, presenting for the following:  Physical        5/6/2024     4:04 PM   Additional Questions   Roomed by Abraham   Accompanied by Self        Health Care Directive  Patient does not have a Health Care Directive or Living Will: Discussed advance care planning with patient; however, patient declined at this time.    HPI  Hot flashes decreasing sleep and interrupting work.  Chronic conditions stable.  Not smoking            5/2/2024   General Health   How would you rate your overall physical health? (!) FAIR   Feel stress (tense, anxious, or unable " to sleep) Not at all         5/2/2024   Nutrition   Three or more servings of calcium each day? Yes   Diet: Regular (no restrictions)   How many servings of fruit and vegetables per day? (!) 0-1   How many sweetened beverages each day? 0-1         5/2/2024   Exercise   Days per week of moderate/strenous exercise 1 day   Average minutes spent exercising at this level 30 min   (!) EXERCISE CONCERN      5/2/2024   Social Factors   Frequency of gathering with friends or relatives More than three times a week   Worry food won't last until get money to buy more No   Food not last or not have enough money for food? No   Do you have housing?  Yes   Are you worried about losing your housing? No   Lack of transportation? No   Unable to get utilities (heat,electricity)? No         5/2/2024   Fall Risk   Fallen 2 or more times in the past year? No   Trouble with walking or balance? No          5/2/2024   Dental   Dentist two times every year? Yes            Today's PHQ-2 Score:       5/6/2024     3:55 PM   PHQ-2 ( 1999 Pf[employer])   Q1: Little interest or pleasure in doing things 0   Q2: Feeling down, depressed or hopeless 0   PHQ-2 Score 0   Q1: Little interest or pleasure in doing things Not at all   Q2: Feeling down, depressed or hopeless Not at all   PHQ-2 Score 0           5/2/2024   Substance Use   Alcohol more than 3/day or more than 7/wk No   Do you use any other substances recreationally? No     Social History     Tobacco Use    Smoking status: Never    Smokeless tobacco: Never    Tobacco comments:     no second hand smoke   Substance Use Topics    Alcohol use: Yes     Alcohol/week: 0.0 standard drinks of alcohol     Comment: occassional    Drug use: No             5/2/2024   Breast Cancer Screening   Family history of breast, colon, or ovarian cancer? No / Unknown              5/2/2024   STI Screening   New sexual partner(s) since last STI/HIV test? No     History of abnormal Pap smear: YES - updated in Problem List and  "Health Maintenance accordingly        Latest Ref Rng & Units 10/3/2022     2:46 PM 4/7/2021    10:25 AM 4/7/2021    10:22 AM   PAP / HPV   PAP  Low-grade squamous intraepithelial lesion (LSIL) encompassing HPV/mild dysplasia/CIN1      PAP (Historical)    LSIL    HPV 16 DNA Negative Negative  Negative     HPV 18 DNA Negative Negative  Negative     Other HR HPV Negative Positive  Positive       ASCVD Risk   The ASCVD Risk score (Campbell LOPES, et al., 2019) failed to calculate for the following reasons:    The valid HDL cholesterol range is 20 to 100 mg/dL           Reviewed and updated as needed this visit by Provider   Tobacco  Allergies  Meds  Problems  Med Hx  Surg Hx  Fam Hx                  Review of Systems  Constitutional, HEENT, cardiovascular, pulmonary, gi and gu systems are negative, except as otherwise noted.     Objective    Exam  /87 (BP Location: Left arm, Patient Position: Chair, Cuff Size: Adult Regular)   Pulse 95   Temp 98.6  F (37  C) (Tympanic)   Resp 18   Ht 1.626 m (5' 4\")   Wt 74.8 kg (165 lb)   LMP  (LMP Unknown)   SpO2 98%   BMI 28.32 kg/m     Estimated body mass index is 28.32 kg/m  as calculated from the following:    Height as of this encounter: 1.626 m (5' 4\").    Weight as of this encounter: 74.8 kg (165 lb).    Physical Exam  GENERAL: alert and no distress  EYES: Eyes grossly normal to inspection, PERRL and conjunctivae and sclerae normal  HENT: normal cephalic/atraumatic, right ear: scarred TM for PE tube, left ear: normal: no effusions, no erythema, normal landmarks, nasal mucosa edema and mouth without ulcers or lesions, oropharynx clear, and oral mucous membranes moist  NECK: no adenopathy, no asymmetry, masses, or scars  RESP: lungs clear to auscultation - no rales, rhonchi or wheezes  CV: regular rate and rhythm, normal S1 S2, no S3 or S4, no murmur, click or rub, no peripheral edema  ABDOMEN: soft, nontender, no hepatosplenomegaly, no masses and bowel " sounds normal   (female): normal female external genitalia, normal urethral meatus , vaginal mucosal atrophy, and normal cervix, adnexae, and uterus without masses.  MS: no gross musculoskeletal defects noted, no edema  SKIN: no suspicious lesions or rashes and keratoses - seborrheic  NEURO: Normal strength and tone, mentation intact and speech normal  PSYCH: mentation appears normal, affect normal/bright        Signed Electronically by: Lynda Painting MD

## 2024-05-06 NOTE — PATIENT INSTRUCTIONS
"At [facility], we strive to deliver an exceptional experience to you, every time we see you. If you receive a survey, please complete it as we do value your feedback.  If you have MyChart, you can expect to receive results automatically within 24 hours of their completion.  Your provider will send a note interpreting your results as well.   If you do not have MyChart, you should receive your results in about a week by mail.    Your care team:                            Family Medicine Internal Medicine   MD Shimon Blevins, MD Lynda Floyd, MD Lesly Naik, PASanthoshC    Marquis Reich, MD Pediatrics   Ray Mcgarry, PA-C  Carolyne Pope, MD Sveta Streeter, MD Janice Ceron APRMAHSA CNP   WALESKA Taylor CNP, MD Katelyn Gray, MD Ananya Wilkerson, CNP  Same-Day (No follow up visit)   Peter Whittaker, CECILLE Kwon, PASanthoshC    Florecita Gaming PASanthoshC     Clinic hours: Monday - Thursday 7 am-6 pm; Fridays 7 am-5 pm.   Urgent care: Monday - Friday 10 am- 8 pm; Saturday and Sunday 9 am-5 pm.    Clinic: [phone]       [facility]: Monday - Thursday 8 am - 7 pm; Friday 8 am - 6 pm  [facility]: [phone]     Preventive Care Advice   This is general advice we often give to help people stay healthy. Your care team may have specific advice just for you. Please talk to your care team about your own preventive care needs.  Lifestyle  Exercise at least 150 minutes each week (30 minutes a day, 5 days a week).  Do muscle strengthening activities 2 days a week. These help control your weight and prevent disease.  No smoking.  Wear sunscreen to prevent skin cancer.  Have your home tested for radon every 2 to 5 years. Radon is a colorless, odorless gas that can harm your lungs. To learn more, go to www.health.state.[place] and search for \"Radon in Homes.\"  Keep guns unloaded " "and locked up in a safe place like a safe or gun vault, or, use a gun lock and hide the keys. Always lock away bullets separately. To learn more, visit dps.[place].gov and search for \"safe gun storage.\"  Nutrition  Eat 5 or more servings of fruits and vegetables each day.  Try wheat bread, brown rice and whole grain pasta (instead of white bread, rice, and pasta).  Get enough calcium and vitamin D. Check the label on foods and aim for 100% of the RDA (recommended daily allowance).  Regular exams  Have a dental exam and cleaning every 6 months.  See your health care team every year to talk about:  Any changes in your health.  Any medicines your care team has prescribed.  Preventive care, family planning, and ways to prevent chronic diseases.  Shots (vaccines)   HPV shots (up to age 26), if you've never had them before.  Hepatitis B shots (up to age 59), if you've never had them before.  COVID-19 shot: Get this shot when it's due.  Flu shot: Get a flu shot every year.  Tetanus shot: Get a tetanus shot every 10 years.  Pneumococcal, hepatitis A, and RSV shots: Ask your care team if you need these based on your risk.  Shingles shot (for age 50 and up).  General health tests  Diabetes screening:  Starting at age 35, Get screened for diabetes at least every 3 years.  If you are younger than age 35, ask your care team if you should be screened for diabetes.  Cholesterol test: At age 39, start having a cholesterol test every 5 years, or more often if advised.  Bone density scan (DEXA): At age 50, ask your care team if you should have this scan for osteoporosis (brittle bones).  Hepatitis C: Get tested at least once in your life.  Abdominal aortic aneurysm screening: Talk to your doctor about having this screening if you:  Have ever smoked; and  Are biologically male; and  Are between the ages of 65 and 75.  STIs (sexually transmitted infections)  Before age 24: Ask your care team if you should be screened for STIs.  After age " 24: Get screened for STIs if you're at risk. You are at risk for STIs (including HIV) if:  You are sexually active with more than one person.  You don't use condoms every time.  You or a partner was diagnosed with a sexually transmitted infection.  If you are at risk for HIV, ask about PrEP medicine to prevent HIV.  Get tested for HIV at least once in your life, whether you are at risk for HIV or not.  Cancer screening tests  Cervical cancer screening: If you have a cervix, begin getting regular cervical cancer screening tests at age 21. Most people who have regular screenings with normal results can stop after age 65. Talk about this with your provider.  Breast cancer scan (mammogram): If you've ever had breasts, begin having regular mammograms starting at age 40. This is a scan to check for breast cancer.  Colon cancer screening: It is important to start screening for colon cancer at age 45.  Have a colonoscopy test every 10 years (or more often if you're at risk) Or, ask your provider about stool tests like a FIT test every year or Cologuard test every 3 years.  To learn more about your testing options, visit: www.UUSEE/487258.pdf.  For help making a decision, visit: flaquito.maia/id18804.  Prostate cancer screening test: If you have a prostate and are age 55 to 69, ask your provider if you would benefit from a yearly prostate cancer screening test.  Lung cancer screening: If you are a current or former smoker age 50 to 80, ask your care team if ongoing lung cancer screenings are right for you.  For informational purposes only. Not to replace the advice of your health care provider. Copyright   2023 [facility]. All rights reserved. Clinically reviewed by the [facility]. Wilshire Axon 172211 - REV 04/24.

## 2024-05-06 NOTE — PROGRESS NOTES
Prior to immunization administration, verified patients identity using patient s name and date of birth. Please see Immunization Activity for additional information.     Screening Questionnaire for Adult Immunization    Are you sick today?   No   Do you have allergies to medications, food, a vaccine component or latex?   No   Have you ever had a serious reaction after receiving a vaccination?   No   Do you have a long-term health problem with heart, lung, kidney, or metabolic disease (e.g., diabetes), asthma, a blood disorder, no spleen, complement component deficiency, a cochlear implant, or a spinal fluid leak?  Are you on long-term aspirin therapy?   No   Do you have cancer, leukemia, HIV/AIDS, or any other immune system problem?   No   Do you have a parent, brother, or sister with an immune system problem?   No   In the past 3 months, have you taken medications that affect  your immune system, such as prednisone, other steroids, or anticancer drugs; drugs for the treatment of rheumatoid arthritis, Crohn s disease, or psoriasis; or have you had radiation treatments?   No   Have you had a seizure, or a brain or other nervous system problem?   No   During the past year, have you received a transfusion of blood or blood    products, or been given immune (gamma) globulin or antiviral drug?   No   For women: Are you pregnant or is there a chance you could become       pregnant during the next month?   No   Have you received any vaccinations in the past 4 weeks?   No     Immunization questionnaire answers were all negative.      Patient instructed to remain in clinic for 15 minutes afterwards, and to report any adverse reactions.     Screening performed by Rochelle Rucker MA on 5/6/2024 at 4:41 PM.

## 2024-05-07 LAB
ALBUMIN SERPL BCG-MCNC: 4.3 G/DL (ref 3.5–5.2)
ALP SERPL-CCNC: 123 U/L (ref 40–150)
ALT SERPL W P-5'-P-CCNC: 24 U/L (ref 0–50)
ANION GAP SERPL CALCULATED.3IONS-SCNC: 14 MMOL/L (ref 7–15)
AST SERPL W P-5'-P-CCNC: 32 U/L (ref 0–45)
BILIRUB SERPL-MCNC: 0.3 MG/DL
BUN SERPL-MCNC: 13.9 MG/DL (ref 6–20)
CALCIUM SERPL-MCNC: 9.2 MG/DL (ref 8.6–10)
CHLORIDE SERPL-SCNC: 103 MMOL/L (ref 98–107)
CREAT SERPL-MCNC: 0.89 MG/DL (ref 0.51–0.95)
DEPRECATED HCO3 PLAS-SCNC: 23 MMOL/L (ref 22–29)
EGFRCR SERPLBLD CKD-EPI 2021: 75 ML/MIN/1.73M2
GLUCOSE SERPL-MCNC: 115 MG/DL (ref 70–99)
HBV SURFACE AB SERPL IA-ACNC: 50 M[IU]/ML
HBV SURFACE AB SERPL IA-ACNC: REACTIVE M[IU]/ML
POTASSIUM SERPL-SCNC: 3.3 MMOL/L (ref 3.4–5.3)
PROT SERPL-MCNC: 7 G/DL (ref 6.4–8.3)
SODIUM SERPL-SCNC: 140 MMOL/L (ref 135–145)

## 2024-05-08 ENCOUNTER — MYC MEDICAL ADVICE (OUTPATIENT)
Dept: FAMILY MEDICINE | Facility: CLINIC | Age: 59
End: 2024-05-08
Payer: COMMERCIAL

## 2024-05-08 ENCOUNTER — LAB (OUTPATIENT)
Dept: LAB | Facility: CLINIC | Age: 59
End: 2024-05-08
Payer: COMMERCIAL

## 2024-05-08 DIAGNOSIS — Z11.1 SCREENING EXAMINATION FOR PULMONARY TUBERCULOSIS: Primary | ICD-10-CM

## 2024-05-08 DIAGNOSIS — Z11.1 SCREENING EXAMINATION FOR PULMONARY TUBERCULOSIS: ICD-10-CM

## 2024-05-08 PROCEDURE — 86481 TB AG RESPONSE T-CELL SUSP: CPT

## 2024-05-08 PROCEDURE — 36415 COLL VENOUS BLD VENIPUNCTURE: CPT

## 2024-05-09 LAB
BKR LAB AP GYN ADEQUACY: NORMAL
BKR LAB AP GYN INTERPRETATION: NORMAL
BKR LAB AP HPV REFLEX: NORMAL
BKR LAB AP PREVIOUS ABNL DX: NORMAL
BKR LAB AP PREVIOUS ABNORMAL: NORMAL
PATH REPORT.COMMENTS IMP SPEC: NORMAL
PATH REPORT.COMMENTS IMP SPEC: NORMAL
PATH REPORT.RELEVANT HX SPEC: NORMAL

## 2024-05-10 LAB
GAMMA INTERFERON BACKGROUND BLD IA-ACNC: 0.01 IU/ML
M TB IFN-G BLD-IMP: ABNORMAL
M TB IFN-G CD4+ BCKGRND COR BLD-ACNC: 0.34 IU/ML
MITOGEN IGNF BCKGRD COR BLD-ACNC: 0 IU/ML
MITOGEN IGNF BCKGRD COR BLD-ACNC: 0 IU/ML
QUANTIFERON MITOGEN: 0.35 IU/ML
QUANTIFERON NIL TUBE: 0.01 IU/ML
QUANTIFERON TB1 TUBE: 0.01 IU/ML
QUANTIFERON TB2 TUBE: 0.01

## 2024-05-13 DIAGNOSIS — Z11.1 SCREENING EXAMINATION FOR PULMONARY TUBERCULOSIS: Primary | ICD-10-CM

## 2024-05-18 ENCOUNTER — MYC REFILL (OUTPATIENT)
Dept: FAMILY MEDICINE | Facility: CLINIC | Age: 59
End: 2024-05-18
Payer: COMMERCIAL

## 2024-05-18 DIAGNOSIS — L20.84 INTRINSIC ATOPIC DERMATITIS: ICD-10-CM

## 2024-05-20 ENCOUNTER — NURSE TRIAGE (OUTPATIENT)
Dept: NURSING | Facility: CLINIC | Age: 59
End: 2024-05-20

## 2024-05-20 ENCOUNTER — OFFICE VISIT (OUTPATIENT)
Dept: FAMILY MEDICINE | Facility: CLINIC | Age: 59
End: 2024-05-20
Payer: COMMERCIAL

## 2024-05-20 ENCOUNTER — MYC MEDICAL ADVICE (OUTPATIENT)
Dept: FAMILY MEDICINE | Facility: CLINIC | Age: 59
End: 2024-05-20

## 2024-05-20 VITALS
DIASTOLIC BLOOD PRESSURE: 100 MMHG | RESPIRATION RATE: 20 BRPM | HEART RATE: 88 BPM | SYSTOLIC BLOOD PRESSURE: 156 MMHG | HEIGHT: 64 IN | OXYGEN SATURATION: 100 % | WEIGHT: 177 LBS | BODY MASS INDEX: 30.22 KG/M2

## 2024-05-20 DIAGNOSIS — H66.011 NON-RECURRENT ACUTE SUPPURATIVE OTITIS MEDIA OF RIGHT EAR WITH SPONTANEOUS RUPTURE OF TYMPANIC MEMBRANE: Primary | ICD-10-CM

## 2024-05-20 DIAGNOSIS — N95.1 MENOPAUSAL SYNDROME (HOT FLASHES): ICD-10-CM

## 2024-05-20 DIAGNOSIS — I10 ESSENTIAL HYPERTENSION: ICD-10-CM

## 2024-05-20 DIAGNOSIS — J30.2 SEASONAL ALLERGIC RHINITIS, UNSPECIFIED TRIGGER: ICD-10-CM

## 2024-05-20 PROCEDURE — 99214 OFFICE O/P EST MOD 30 MIN: CPT | Performed by: PHYSICIAN ASSISTANT

## 2024-05-20 RX ORDER — DESONIDE 0.5 MG/G
CREAM TOPICAL
Qty: 60 G | Refills: 2 | Status: SHIPPED | OUTPATIENT
Start: 2024-05-20

## 2024-05-20 RX ORDER — PREDNISONE 20 MG/1
40 TABLET ORAL DAILY
Qty: 10 TABLET | Refills: 0 | Status: SHIPPED | OUTPATIENT
Start: 2024-05-20 | End: 2024-05-25

## 2024-05-20 RX ORDER — OFLOXACIN 3 MG/ML
4 SOLUTION/ DROPS OPHTHALMIC 2 TIMES DAILY
Qty: 10 ML | Refills: 0 | Status: SHIPPED | OUTPATIENT
Start: 2024-05-20 | End: 2024-05-27

## 2024-05-20 ASSESSMENT — PAIN SCALES - GENERAL: PAINLEVEL: MODERATE PAIN (5)

## 2024-05-20 NOTE — TELEPHONE ENCOUNTER
Nurse Triage SBAR    Consent: Not needed    Situation: Patient calling with worsening earache.    Background: Reports she woke from sleep last night with earache that she rates pain a 5/10.    Assessment: earache    Protocol Recommended Disposition: See in Office Today or Tomorrow    Recommendation: Advised patient to make an appointment. Transferred to scheduling. Reviewed concerning symptoms and when to call back.     2LT or FYI: No    Follow-Up: None    ADS: No     Jessy Kevin RN [facility] 5/20/2024 7:32 AM      Reason for Disposition   All other earaches  (Exceptions: Earache lasting < 1 hour, and earache from air travel.)    Additional Information   Negative: Sounds like a life-threatening emergency to the triager   Negative: Pink or red swelling behind the ear   Negative: Stiff neck (can't touch chin to chest)   Negative: Patient sounds very sick or weak to the triager   Negative: Severe earache pain   Negative: Fever > 103 F (39.4 C)   Negative: Pointed object was inserted into the ear canal (e.g., a pencil, stick, or wire)   Negative: White, yellow, or green discharge   Negative: Diabetes mellitus or a weak immune system (e.g., HIV positive, cancer chemotherapy, transplant patient)   Negative: Bloody discharge or unexplained bleeding from ear canal   Negative: New blurred vision or vision changes    Protocols used: Earache-A-[place]

## 2024-05-20 NOTE — PROGRESS NOTES
Addendum: At around noon patient had increased pressure in her ear followed by blood-tinged clear drainage, return for recheck.  Right EAC with serosanguineous drainage mostly, near eardrum, thicker purulent drainage and bubbling.  No clear perforation visualized, suspect small.  Will start ofloxacin drops in addition to her medications.  Discussed recheck in a week to ensure any perforation is healing appropriately.  She was scheduled with her PCP as she would also like to discuss her menopausal syndrome and reaction to hormones she had.        Assessment & Plan     Non-recurrent acute suppurative otitis media of right ear without spontaneous rupture of tympanic membrane  Discussed appearance on exam.  Severe pain and significant bulging, discussed potential for rupture.  Will try prednisone on top of the Augmentin to try to decrease inflammatory response.  Evaluate dosing potential adverse effects.  - amoxicillin-clavulanate (AUGMENTIN) 875-125 MG tablet; Take 1 tablet by mouth 2 times daily for 7 days  - predniSONE (DELTASONE) 20 MG tablet; Take 2 tablets (40 mg) by mouth daily for 5 days    Seasonal allergic rhinitis, unspecified trigger  May be contributing to underlying eustachian tube dysfunction    Menopausal syndrome (hot flashes)  Swelling and may have exacerbated fusion    Essential hypertension  Elevated today however in significant pain.  Under control at her last visit.                Sharla Espinal is a 59 year old, presenting for the following health issues:  Otalgia (Right ear pain/)      5/20/2024     9:30 AM   Additional Questions   Roomed by DM     History of Present Illness       Reason for visit:  Ear pain  Symptom onset:  Today  Symptoms include:  Severe ear muriel  Symptom intensity:  Severe  Symptom progression:  Staying the same  Had these symptoms before:  No    She eats 0-1 servings of fruits and vegetables daily.She consumes 0 sweetened beverage(s) daily.She exercises with enough effort  "to increase her heart rate 10 to 19 minutes per day.  She exercises with enough effort to increase her heart rate 3 or less days per week.   She is taking medications regularly.       Patient woke up at 2 AM with severe ear pain.  Has continued to be severe.  Tried eardrops and Advil without change.  Has had some plugging and decreased hearing in that ear for some time.  Did have a physical on 5/6/2024 and notes that she had fluid in her middle ear and thought to be likely eustachian tube.  She notes that she has sinus issues at baseline, has not seem to change much.  She was started on hormone replacement at that visit and had a lot of swelling in her legs but also her face, she did stop this 2 days ago.  Wonders if it could be related.              Objective    BP (!) 156/100   Pulse 88   Resp 20   Ht 1.626 m (5' 4\")   Wt 80.3 kg (177 lb)   LMP  (LMP Unknown)   SpO2 100%   BMI 30.38 kg/m    Body mass index is 30.38 kg/m .  Physical Exam   GENERAL: Well-developed, well-nourished.  Tearful.  Uncomfortable.  EYES: Eyes grossly normal to inspection, PERRL and conjunctivae and sclerae normal  HENT: normal cephalic/atraumatic, right ear: Bulging TM, opaque effusion, erythematous, clear fluid in canal, no perforation, left ear: clear effusion, nasal mucosa edematous , oropharynx clear, and oral mucous membranes moist  NECK: no adenopathy, no asymmetry, masses, or scars  RESP: Normal effort            Signed Electronically by: Florecita Gaming PA-C    "

## 2024-05-20 NOTE — TELEPHONE ENCOUNTER
Can someone call her? I would like to look at her ear as it likely ruptured so we can see the size of the hole and add on drops if needed. If she can come back today I will just see her between my other patients and can addend her previous visit from the morning otherwise tomorrow?

## 2024-05-28 ENCOUNTER — PATIENT OUTREACH (OUTPATIENT)
Dept: FAMILY MEDICINE | Facility: CLINIC | Age: 59
End: 2024-05-28
Payer: COMMERCIAL

## 2024-05-29 ENCOUNTER — OFFICE VISIT (OUTPATIENT)
Dept: FAMILY MEDICINE | Facility: CLINIC | Age: 59
End: 2024-05-29
Payer: COMMERCIAL

## 2024-05-29 VITALS
HEIGHT: 64 IN | HEART RATE: 85 BPM | RESPIRATION RATE: 16 BRPM | OXYGEN SATURATION: 98 % | WEIGHT: 173.2 LBS | SYSTOLIC BLOOD PRESSURE: 112 MMHG | TEMPERATURE: 98 F | BODY MASS INDEX: 29.57 KG/M2 | DIASTOLIC BLOOD PRESSURE: 76 MMHG

## 2024-05-29 DIAGNOSIS — H66.011 NON-RECURRENT ACUTE SUPPURATIVE OTITIS MEDIA OF RIGHT EAR WITH SPONTANEOUS RUPTURE OF TYMPANIC MEMBRANE: Primary | ICD-10-CM

## 2024-05-29 PROCEDURE — 99213 OFFICE O/P EST LOW 20 MIN: CPT | Performed by: FAMILY MEDICINE

## 2024-05-29 ASSESSMENT — PAIN SCALES - GENERAL: PAINLEVEL: NO PAIN (0)

## 2024-05-29 NOTE — PROGRESS NOTES
"  Assessment & Plan     Non-recurrent acute suppurative otitis media of right ear with spontaneous rupture of tympanic membrane  Improved but still with some hearing loss - referral to ENT if not improving over the next 2 week.  - Adult ENT  Referral; Future      Subjective   Kylie is a 59 year old, presenting for the following health issues:  RECHECK EAR(S) and Follow Up        5/29/2024     3:55 PM   Additional Questions   Roomed by DM   Accompanied by self     HPI       Follow up to recheck ears from 5/20 visit. Has finished antibiotics, prednisone and antibiotic ear drops.. No current pain.           Review of Systems  Constitutional, HEENT, cardiovascular, pulmonary, gi and gu systems are negative, except as otherwise noted.      Objective    /76   Pulse 85   Temp 98  F (36.7  C)   Resp 16   Ht 1.626 m (5' 4\")   Wt 78.6 kg (173 lb 3.2 oz)   LMP  (LMP Unknown)   SpO2 98%   BMI 29.73 kg/m    Body mass index is 29.73 kg/m .  Physical Exam   GENERAL: alert and no distress  HENT: normal cephalic/atraumatic, right ear: No redness or fluid and perforation 5:00 position, left ear: normal: no effusions, no erythema, normal landmarks, nose and mouth without ulcers or lesions, oropharynx clear, and oral mucous membranes moist  NECK: no adenopathy, no asymmetry, masses, or scars  PSYCH: mentation appears normal, affect normal/bright            Signed Electronically by: Lynda Painting MD    "

## 2024-06-10 ENCOUNTER — LAB (OUTPATIENT)
Dept: LAB | Facility: CLINIC | Age: 59
End: 2024-06-10
Payer: COMMERCIAL

## 2024-06-10 DIAGNOSIS — Z11.1 SCREENING EXAMINATION FOR PULMONARY TUBERCULOSIS: ICD-10-CM

## 2024-06-10 PROCEDURE — 36415 COLL VENOUS BLD VENIPUNCTURE: CPT

## 2024-06-10 PROCEDURE — 86481 TB AG RESPONSE T-CELL SUSP: CPT

## 2024-06-12 LAB
GAMMA INTERFERON BACKGROUND BLD IA-ACNC: 0.02 IU/ML
M TB IFN-G BLD-IMP: NEGATIVE
M TB IFN-G CD4+ BCKGRND COR BLD-ACNC: 3.32 IU/ML
MITOGEN IGNF BCKGRD COR BLD-ACNC: 0.01 IU/ML
MITOGEN IGNF BCKGRD COR BLD-ACNC: 0.01 IU/ML
QUANTIFERON MITOGEN: 3.34 IU/ML
QUANTIFERON NIL TUBE: 0.02 IU/ML
QUANTIFERON TB1 TUBE: 0.03 IU/ML
QUANTIFERON TB2 TUBE: 0.03

## 2024-07-21 DIAGNOSIS — J30.89 CHRONIC NON-SEASONAL ALLERGIC RHINITIS: ICD-10-CM

## 2024-07-22 RX ORDER — LEVOCETIRIZINE DIHYDROCHLORIDE 5 MG/1
10 TABLET, FILM COATED ORAL EVERY EVENING
Qty: 180 TABLET | Refills: 0 | Status: SHIPPED | OUTPATIENT
Start: 2024-07-22

## 2024-07-22 RX ORDER — FLUTICASONE PROPIONATE 50 MCG
SPRAY, SUSPENSION (ML) NASAL
Qty: 48 G | Refills: 0 | Status: SHIPPED | OUTPATIENT
Start: 2024-07-22

## 2024-08-12 ENCOUNTER — OFFICE VISIT (OUTPATIENT)
Dept: FAMILY MEDICINE | Facility: CLINIC | Age: 59
End: 2024-08-12
Attending: FAMILY MEDICINE
Payer: COMMERCIAL

## 2024-08-12 VITALS
WEIGHT: 182 LBS | RESPIRATION RATE: 18 BRPM | SYSTOLIC BLOOD PRESSURE: 124 MMHG | TEMPERATURE: 97.5 F | OXYGEN SATURATION: 100 % | HEIGHT: 64 IN | HEART RATE: 80 BPM | DIASTOLIC BLOOD PRESSURE: 79 MMHG | BODY MASS INDEX: 31.07 KG/M2

## 2024-08-12 DIAGNOSIS — N95.1 MENOPAUSAL SYNDROME (HOT FLASHES): ICD-10-CM

## 2024-08-12 DIAGNOSIS — I10 HYPERTENSION GOAL BP (BLOOD PRESSURE) < 140/90: ICD-10-CM

## 2024-08-12 DIAGNOSIS — E66.811 CLASS 1 OBESITY DUE TO EXCESS CALORIES WITH SERIOUS COMORBIDITY AND BODY MASS INDEX (BMI) OF 31.0 TO 31.9 IN ADULT: Primary | ICD-10-CM

## 2024-08-12 DIAGNOSIS — E66.09 CLASS 1 OBESITY DUE TO EXCESS CALORIES WITH SERIOUS COMORBIDITY AND BODY MASS INDEX (BMI) OF 31.0 TO 31.9 IN ADULT: Primary | ICD-10-CM

## 2024-08-12 PROCEDURE — 99214 OFFICE O/P EST MOD 30 MIN: CPT | Performed by: FAMILY MEDICINE

## 2024-08-12 PROCEDURE — G2211 COMPLEX E/M VISIT ADD ON: HCPCS | Performed by: FAMILY MEDICINE

## 2024-08-12 ASSESSMENT — ASTHMA QUESTIONNAIRES
QUESTION_4 LAST FOUR WEEKS HOW OFTEN HAVE YOU USED YOUR RESCUE INHALER OR NEBULIZER MEDICATION (SUCH AS ALBUTEROL): NOT AT ALL
QUESTION_5 LAST FOUR WEEKS HOW WOULD YOU RATE YOUR ASTHMA CONTROL: COMPLETELY CONTROLLED
ACT_TOTALSCORE: 25
QUESTION_1 LAST FOUR WEEKS HOW MUCH OF THE TIME DID YOUR ASTHMA KEEP YOU FROM GETTING AS MUCH DONE AT WORK, SCHOOL OR AT HOME: NONE OF THE TIME
ACT_TOTALSCORE: 25
QUESTION_3 LAST FOUR WEEKS HOW OFTEN DID YOUR ASTHMA SYMPTOMS (WHEEZING, COUGHING, SHORTNESS OF BREATH, CHEST TIGHTNESS OR PAIN) WAKE YOU UP AT NIGHT OR EARLIER THAN USUAL IN THE MORNING: NOT AT ALL
QUESTION_2 LAST FOUR WEEKS HOW OFTEN HAVE YOU HAD SHORTNESS OF BREATH: NOT AT ALL

## 2024-08-12 ASSESSMENT — PAIN SCALES - GENERAL: PAINLEVEL: NO PAIN (0)

## 2024-08-12 NOTE — PROGRESS NOTES
"  Assessment & Plan     Class 1 obesity due to excess calories with serious comorbidity and body mass index (BMI) of 31.0 to 31.9 in adult  Continued trouble with weight loss - trial of Wegovy  - Semaglutide-Weight Management (WEGOVY) 0.25 MG/0.5ML pen; Inject 0.25 mg subcutaneously once a week  - Semaglutide-Weight Management (WEGOVY) 0.5 MG/0.5ML pen; Inject 0.5 mg subcutaneously once a week    Menopausal syndrome (hot flashes)  Doing well on hormone replacement - continue    Hypertension goal BP (blood pressure) < 140/90  Co morbid condition  - Semaglutide-Weight Management (WEGOVY) 0.25 MG/0.5ML pen; Inject 0.25 mg subcutaneously once a week  - Semaglutide-Weight Management (WEGOVY) 0.5 MG/0.5ML pen; Inject 0.5 mg subcutaneously once a week      The uses and side effects, including black box warnings as appropriate, were discussed in detail.  All patient questions were answered.  The patient was instructed to call immediately if any side effects developed.     Follow up in 3 months.    Sharla Espinal is a 59 year old, presenting for the following health issues:  Follow Up and Recheck Medication    History of Present Illness       Reason for visit:  Med follow up    She eats 0-1 servings of fruits and vegetables daily.She consumes 0 sweetened beverage(s) daily.She exercises with enough effort to increase her heart rate 9 or less minutes per day.  She exercises with enough effort to increase her heart rate 3 or less days per week.   She is taking medications regularly.         Medication Followup of estradiol and progesterone  Taking Medication as prescribed: yes  Side Effects:  None  Medication Helping Symptoms:  yes      Trouble with weight loss.  Not exercising or eating better.          Objective    /79   Pulse 80   Temp 97.5  F (36.4  C) (Temporal)   Resp 18   Ht 1.626 m (5' 4\")   Wt 82.6 kg (182 lb)   LMP  (LMP Unknown)   SpO2 100%   BMI 31.24 kg/m    Body mass index is 31.24 " kg/m .  Physical Exam   GENERAL: alert, no distress, and obese  RESP: lungs clear to auscultation - no rales, rhonchi or wheezes  PSYCH: mentation appears normal, affect normal/bright            Signed Electronically by: Lynda Painting MD

## 2024-08-12 NOTE — PATIENT INSTRUCTIONS
At [facility], we strive to deliver an exceptional experience to you, every time we see you. If you receive a survey, please let us know what we are doing well and/or what we could improve upon, as we do value your feedback.  If you have MyChart, you can expect to receive results automatically within 24 hours of their completion.  Your provider will send a note interpreting your results as well.   If you do not have MyChart, you should receive your results in about a week by mail.    Your care team:                            Family Medicine Internal Medicine   MD Shimon Blevins, MD Lynda Floyd, MD Micheal Gomez, MD Lesly Ferraro, PASanthoshC    Marquis Reich, MD Pediatrics   Carolyne Pope, MD Sveta Streeter, MD Destiny Moore, APRN CNP Janice Ceron APRN CNP   MD Aminata Snider, MD Ananya Wilkerson, CNP     Peter Whittaker, CNP Same-Day Provider (No follow-up visits)   WALESKA Ocasio, DNP Zaria Kwon, WALESKA Sharif, FNP, BC DELMA ReyesC     Clinic hours: Monday - Thursday 7 am-6 pm; Fridays 7 am-5 pm.   Urgent care: Monday - Friday 10 am- 8 pm; Saturday and Sunday 9 am-5 pm.    Clinic: [phone]       [facility]: Monday - Thursday 8 am - 7 pm; Friday 8 am - 6 pm  [facility]: [phone]

## 2024-09-03 ENCOUNTER — E-VISIT (OUTPATIENT)
Dept: URGENT CARE | Facility: CLINIC | Age: 59
End: 2024-09-03
Payer: COMMERCIAL

## 2024-09-03 DIAGNOSIS — R21 RASH AND NONSPECIFIC SKIN ERUPTION: Primary | ICD-10-CM

## 2024-09-03 PROCEDURE — 99421 OL DIG E/M SVC 5-10 MIN: CPT | Performed by: EMERGENCY MEDICINE

## 2024-09-03 RX ORDER — PREDNISONE 50 MG/1
50 TABLET ORAL DAILY
Qty: 3 TABLET | Refills: 0 | Status: SHIPPED | OUTPATIENT
Start: 2024-09-03 | End: 2024-09-06

## 2024-09-03 RX ORDER — METHYLPREDNISOLONE 4 MG
TABLET, DOSE PACK ORAL
Qty: 21 TABLET | Refills: 0 | Status: SHIPPED | OUTPATIENT
Start: 2024-09-03 | End: 2024-09-19

## 2024-09-12 ENCOUNTER — MYC MEDICAL ADVICE (OUTPATIENT)
Dept: FAMILY MEDICINE | Facility: CLINIC | Age: 59
End: 2024-09-12
Payer: COMMERCIAL

## 2024-09-12 DIAGNOSIS — E66.811 CLASS 1 OBESITY DUE TO EXCESS CALORIES WITH SERIOUS COMORBIDITY AND BODY MASS INDEX (BMI) OF 31.0 TO 31.9 IN ADULT: Primary | ICD-10-CM

## 2024-09-12 DIAGNOSIS — E66.09 CLASS 1 OBESITY DUE TO EXCESS CALORIES WITH SERIOUS COMORBIDITY AND BODY MASS INDEX (BMI) OF 31.0 TO 31.9 IN ADULT: Primary | ICD-10-CM

## 2024-09-12 NOTE — TELEPHONE ENCOUNTER
Called and spoke to pharmacy. State that they did receive the prescription for Wegovy 0.25mg and 0.5mg.   State that they are unable to process the 0.25mg because it is not covered by insurance. States that it does not give them other alternatives that may be covered.      Routing to provider for further advisement.      Britt Watkins, MICHAN, RN  [facility]

## 2024-09-23 ENCOUNTER — TELEPHONE (OUTPATIENT)
Dept: FAMILY MEDICINE | Facility: CLINIC | Age: 59
End: 2024-09-23

## 2024-09-24 NOTE — TELEPHONE ENCOUNTER
PRIOR AUTHORIZATION DENIED    Medication: SEMAGLUTIDE(0.25 OR 0.5MG/DOS) 2 MG/3ML SC SOPN  Insurance Company: [employer] [place] - Phone [phone] Fax [phone]  Denial Date: 9/23/2024  Denial Reason(s):     Appeal Information:     Patient Notified: No

## 2024-10-21 ENCOUNTER — TELEPHONE (OUTPATIENT)
Dept: FAMILY MEDICINE | Facility: CLINIC | Age: 59
End: 2024-10-21
Payer: COMMERCIAL

## 2024-10-21 NOTE — TELEPHONE ENCOUNTER
Patient Quality Outreach    Patient is due for the following:   Diabetes -  Eye Exam  Asthma  -  ACT needed  Colon Cancer Screening    Next Steps:   Schedule a office visit for colonscopy     Type of outreach:    Sent [employer] message.    Next Steps:  Reach out within 90 days via [employer].    Max number of attempts reached: Yes. Will try again in 90 days if patient still on fail list.    Questions for provider review:    None           Luz Velasco MA

## 2024-10-22 DIAGNOSIS — J30.89 CHRONIC NON-SEASONAL ALLERGIC RHINITIS: ICD-10-CM

## 2024-10-22 RX ORDER — FLUTICASONE PROPIONATE 50 MCG
SPRAY, SUSPENSION (ML) NASAL
Qty: 48 G | Refills: 0 | Status: SHIPPED | OUTPATIENT
Start: 2024-10-22

## 2024-10-22 RX ORDER — LEVOCETIRIZINE DIHYDROCHLORIDE 5 MG/1
10 TABLET, FILM COATED ORAL EVERY EVENING
Qty: 180 TABLET | Refills: 3 | Status: SHIPPED | OUTPATIENT
Start: 2024-10-22

## 2024-11-12 ENCOUNTER — MYC MEDICAL ADVICE (OUTPATIENT)
Dept: FAMILY MEDICINE | Facility: CLINIC | Age: 59
End: 2024-11-12
Payer: COMMERCIAL

## 2024-11-12 DIAGNOSIS — E66.811 CLASS 1 OBESITY DUE TO EXCESS CALORIES WITH SERIOUS COMORBIDITY AND BODY MASS INDEX (BMI) OF 31.0 TO 31.9 IN ADULT: Primary | ICD-10-CM

## 2024-11-12 DIAGNOSIS — E66.09 CLASS 1 OBESITY DUE TO EXCESS CALORIES WITH SERIOUS COMORBIDITY AND BODY MASS INDEX (BMI) OF 31.0 TO 31.9 IN ADULT: Primary | ICD-10-CM

## 2024-11-13 DIAGNOSIS — R21 RASH AND NONSPECIFIC SKIN ERUPTION: ICD-10-CM

## 2024-11-13 RX ORDER — PREDNISONE 10 MG/1
TABLET ORAL
Qty: 15 TABLET | Refills: 0 | OUTPATIENT
Start: 2024-11-13

## 2024-11-14 DIAGNOSIS — J45.40 MODERATE PERSISTENT ASTHMA WITHOUT COMPLICATION: ICD-10-CM

## 2024-11-14 RX ORDER — MOMETASONE FUROATE AND FORMOTEROL FUMARATE DIHYDRATE 100; 5 UG/1; UG/1
AEROSOL RESPIRATORY (INHALATION)
Qty: 39 G | Refills: 0 | Status: SHIPPED | OUTPATIENT
Start: 2024-11-14

## 2024-11-20 ENCOUNTER — LAB (OUTPATIENT)
Dept: LAB | Facility: CLINIC | Age: 59
End: 2024-11-20
Payer: COMMERCIAL

## 2024-11-20 DIAGNOSIS — E66.811 CLASS 1 OBESITY DUE TO EXCESS CALORIES WITH SERIOUS COMORBIDITY AND BODY MASS INDEX (BMI) OF 31.0 TO 31.9 IN ADULT: ICD-10-CM

## 2024-11-20 DIAGNOSIS — E66.09 CLASS 1 OBESITY DUE TO EXCESS CALORIES WITH SERIOUS COMORBIDITY AND BODY MASS INDEX (BMI) OF 31.0 TO 31.9 IN ADULT: ICD-10-CM

## 2024-11-20 LAB — TSH SERPL DL<=0.005 MIU/L-ACNC: 1.71 UIU/ML (ref 0.3–4.2)

## 2024-11-20 PROCEDURE — 84443 ASSAY THYROID STIM HORMONE: CPT

## 2024-11-20 PROCEDURE — 36415 COLL VENOUS BLD VENIPUNCTURE: CPT

## 2024-12-14 ENCOUNTER — E-VISIT (OUTPATIENT)
Dept: FAMILY MEDICINE | Facility: CLINIC | Age: 59
End: 2024-12-14
Payer: COMMERCIAL

## 2024-12-14 DIAGNOSIS — J45.41 MODERATE PERSISTENT ASTHMA WITH ACUTE EXACERBATION: Primary | ICD-10-CM

## 2024-12-16 RX ORDER — PREDNISONE 20 MG/1
TABLET ORAL
Qty: 20 TABLET | Refills: 0 | Status: SHIPPED | OUTPATIENT
Start: 2024-12-16

## 2024-12-16 NOTE — PATIENT INSTRUCTIONS
Thank you for choosing us for your care. I have placed an order for a prescription so that you can start treatment. View your full visit summary for details by clicking on the link below. Your pharmacist will able to address any questions you may have about the medication.     If you're not feeling better within 5-7 days, please schedule an appointment.  You can schedule an appointment right here in [facility], or call [phone]  If the visit is for the same symptoms as your eVisit, we'll refund the cost of your eVisit if seen within seven days.

## 2025-02-12 DIAGNOSIS — J45.40 MODERATE PERSISTENT ASTHMA WITHOUT COMPLICATION: ICD-10-CM

## 2025-02-12 RX ORDER — MOMETASONE FUROATE AND FORMOTEROL FUMARATE DIHYDRATE 100; 5 UG/1; UG/1
AEROSOL RESPIRATORY (INHALATION)
Qty: 39 G | Refills: 0 | Status: SHIPPED | OUTPATIENT
Start: 2025-02-12

## 2025-02-18 ENCOUNTER — TELEPHONE (OUTPATIENT)
Dept: FAMILY MEDICINE | Facility: CLINIC | Age: 60
End: 2025-02-18
Payer: COMMERCIAL

## 2025-02-18 ENCOUNTER — MYC MEDICAL ADVICE (OUTPATIENT)
Dept: FAMILY MEDICINE | Facility: CLINIC | Age: 60
End: 2025-02-18
Payer: COMMERCIAL

## 2025-02-18 ASSESSMENT — ASTHMA QUESTIONNAIRES
QUESTION_2 LAST FOUR WEEKS HOW OFTEN HAVE YOU HAD SHORTNESS OF BREATH: NOT AT ALL
QUESTION_3 LAST FOUR WEEKS HOW OFTEN DID YOUR ASTHMA SYMPTOMS (WHEEZING, COUGHING, SHORTNESS OF BREATH, CHEST TIGHTNESS OR PAIN) WAKE YOU UP AT NIGHT OR EARLIER THAN USUAL IN THE MORNING: NOT AT ALL
ACT_TOTALSCORE: 25
ACT_TOTALSCORE: 25
QUESTION_4 LAST FOUR WEEKS HOW OFTEN HAVE YOU USED YOUR RESCUE INHALER OR NEBULIZER MEDICATION (SUCH AS ALBUTEROL): NOT AT ALL
QUESTION_1 LAST FOUR WEEKS HOW MUCH OF THE TIME DID YOUR ASTHMA KEEP YOU FROM GETTING AS MUCH DONE AT WORK, SCHOOL OR AT HOME: NONE OF THE TIME
QUESTION_5 LAST FOUR WEEKS HOW WOULD YOU RATE YOUR ASTHMA CONTROL: COMPLETELY CONTROLLED

## 2025-02-18 NOTE — TELEPHONE ENCOUNTER
Patient Quality Outreach    Patient is due for the following:   Diabetes -  Eye Exam  Asthma  -  ACT needed  Colon Cancer Screening  Depression  -  PHQ-9 needed      Topic Date Due    Flu Vaccine (1) 09/01/2024    COVID-19 Vaccine (4 - 2024-25 season) 09/01/2024    Diptheria Tetanus Pertussis (DTAP/TDAP/TD) Vaccine (2 - Td or Tdap) 01/07/2025       Action(s) Taken:   Schedule a office visit for immunizations     Type of outreach:    Sent [employer] message.    Questions for provider review:    None           Luz Velasco MA

## 2025-02-25 DIAGNOSIS — N95.1 MENOPAUSAL SYNDROME (HOT FLASHES): ICD-10-CM

## 2025-02-25 RX ORDER — ESTRADIOL 0.5 MG/1
0.5 TABLET ORAL DAILY
Qty: 90 TABLET | Refills: 0 | Status: SHIPPED | OUTPATIENT
Start: 2025-02-25

## 2025-02-25 RX ORDER — PROGESTERONE 100 MG/1
100 CAPSULE ORAL DAILY
Qty: 90 CAPSULE | Refills: 0 | Status: SHIPPED | OUTPATIENT
Start: 2025-02-25

## 2025-04-01 ENCOUNTER — OFFICE VISIT (OUTPATIENT)
Dept: OPHTHALMOLOGY | Facility: CLINIC | Age: 60
End: 2025-04-01
Payer: COMMERCIAL

## 2025-04-01 DIAGNOSIS — H40.003 GLAUCOMA SUSPECT OF BOTH EYES: ICD-10-CM

## 2025-04-01 DIAGNOSIS — H52.4 PRESBYOPIA: ICD-10-CM

## 2025-04-01 DIAGNOSIS — H52.13 MYOPIA OF BOTH EYES: Primary | ICD-10-CM

## 2025-04-01 PROCEDURE — 92004 COMPRE OPH EXAM NEW PT 1/>: CPT | Performed by: OPHTHALMOLOGY

## 2025-04-01 PROCEDURE — 92133 CPTRZD OPH DX IMG PST SGM ON: CPT | Performed by: OPHTHALMOLOGY

## 2025-04-01 PROCEDURE — 92015 DETERMINE REFRACTIVE STATE: CPT | Performed by: OPHTHALMOLOGY

## 2025-04-01 ASSESSMENT — TONOMETRY
IOP_METHOD: ICARE
OD_IOP_MMHG: 16
OS_IOP_MMHG: 17

## 2025-04-01 ASSESSMENT — CONF VISUAL FIELD
OS_SUPERIOR_TEMPORAL_RESTRICTION: 0
OD_INFERIOR_TEMPORAL_RESTRICTION: 0
OD_INFERIOR_NASAL_RESTRICTION: 0
OD_NORMAL: 1
OS_NORMAL: 1
OS_SUPERIOR_NASAL_RESTRICTION: 0
OD_SUPERIOR_TEMPORAL_RESTRICTION: 0
OS_INFERIOR_TEMPORAL_RESTRICTION: 0
OS_INFERIOR_NASAL_RESTRICTION: 0
METHOD: COUNTING FINGERS
OD_SUPERIOR_NASAL_RESTRICTION: 0

## 2025-04-01 ASSESSMENT — REFRACTION_MANIFEST
OD_CYLINDER: SPHERE
OD_ADD: +2.50
OS_CYLINDER: SPHERE
OS_SPHERE: -0.50
OD_SPHERE: -1.25
OS_ADD: +2.50

## 2025-04-01 ASSESSMENT — EXTERNAL EXAM - LEFT EYE: OS_EXAM: NORMAL

## 2025-04-01 ASSESSMENT — VISUAL ACUITY
OD_SC: 20/40
OS_SC: 20/25
OS_SC+: -1
METHOD: SNELLEN - LINEAR

## 2025-04-01 ASSESSMENT — EXTERNAL EXAM - RIGHT EYE: OD_EXAM: NORMAL

## 2025-04-01 ASSESSMENT — SLIT LAMP EXAM - LIDS
COMMENTS: NORMAL
COMMENTS: NORMAL

## 2025-04-01 ASSESSMENT — CUP TO DISC RATIO
OD_RATIO: 0.7
OS_RATIO: 0.55

## 2025-04-01 NOTE — PROGRESS NOTES
HPI       COMPREHENSIVE EYE EXAM    In both eyes.  Since onset it is gradually worsening.  Associated symptoms include Negative for eye pain, flashes and floaters.  Treatments tried include eye drops.             Comments    Kylie Arellano is a(n) 60 year old female who presents for a comprehensive exam. Last eye exam was year(s) ago. Since exam, vision has decreased. Uses Pataday as needed. No lubricating drops. No flashes and floaters. No eye pain.     Jarrell Ortega, COMT 7:31 AM April 1, 2025     Dad with armd              Last edited by Bj Key MD on 4/1/2025  7:54 AM.         Review of systems for the eyes was negative other than the pertinent positives/negatives listed in the HPI.      Assessment & Plan    HPI:  Kylie Arellano is a 60 year old female with history of HTN, allergies, presents for complete exam. Notes increased difficulty with distance and near. No redness, flashes or floaters. Uses pataday as needed for tearing      POHx: denies  PMHx: HTN, allergies  Current Medications:   Current Outpatient Medications   Medication Sig Dispense Refill    albuterol (VENTOLIN HFA) 108 (90 Base) MCG/ACT inhaler Take 1 - 2 puffs every 4 hours as needed for cough/wheezing. 18 g 1    amLODIPine-valsartan (EXFORGE) 5-320 MG tablet Take 1 tablet by mouth daily 90 tablet 3    desonide (DESOWEN) 0.05 % external cream Apply sparingly to Legs, up to twice daily as needed. 60 g 2    DULERA 100-5 MCG/ACT inhaler Inhale 2 puffs by mouth into the lungs 2 times daily 39 g 0    estradiol (ESTRACE) 0.5 MG tablet TAKE ONE TABLET BY MOUTH DAILY 90 tablet 0    fluticasone (FLONASE) 50 MCG/ACT nasal spray Spray 2 sprays into both nostrils once daily. 48 g 0    ipratropium - albuterol 0.5 mg/2.5 mg/3 mL (DUONEB) 0.5-2.5 (3) MG/3ML neb solution Take 1 vial (3 mLs) by nebulization every 4 hours as needed for shortness of breath or wheezing 90 mL 0    levocetirizine (XYZAL) 5 MG tablet Take 2 tablets (10 mg) by mouth  every evening 180 tablet 3    montelukast (SINGULAIR) 10 MG tablet Take 1 tablet (10 mg) by mouth at bedtime 90 tablet 3    olopatadine (PATADAY) 0.2 % ophthalmic solution Place 1 drop into both eyes daily Each eye 2.5 mL 11    omeprazole (PRILOSEC) 20 MG DR capsule TAKE 1 CAPSULE (20 MG) BY MOUTH DAILY, 30 - 60 MINUTES BEFORE FIRST MEAL OF THE DAY. 90 capsule 3    progesterone (PROMETRIUM) 100 MG capsule TAKE ONE CAPSULE BY MOUTH DAILY 90 capsule 0    valACYclovir (VALTREX) 500 MG tablet TAKE 1 TABLET BY MOUTH EVERY DAY AND INCREASE TO 2 TABS DAILY FOR 10 DAYS WITH OUTBREAKS 135 tablet 3     No current facility-administered medications for this visit.     FHx: Age related macular degeneration-dad  PSHx: denies history of ocular surgeries       Current Eye Medications:  Pataday PRN    Assessment & Plan:  (H52.13) Myopia of both eyes  (primary encounter diagnosis)  (H52.4) Presbyopia  Patient has minimal change in myopia but a copy of today's glasses prescription was given.  The patient may wish to update the glasses if the lenses are scratched or the frames are too small.  Presbyopia is difficulty seeing up close and is treated with bifocals or over the counter reading glasses      (H40.003) Glaucoma suspect of both eyes  Based on cup to disc asymmetry   OCT nerve fiber layer 04/01/25:   Right eye - G(y) 79 NI (g) 73 TI (g) 131 NS (g) 86 TS (g) 122      Left eye - G(g) 82 NI (g) 82 TI (g) 130 NS (g) 79 TS (g) 124      IOP within normal limits  Repeat oct next visit      Return in about 2 years (around 4/1/2027) for Annual Visit-v/t/d/MRx, OCT RNFL.        Bj Key MD     Attending Physician Attestation:  Complete documentation of historical and exam elements from today's encounter can be found in the full encounter summary report (not reduplicated in this progress note).  I personally obtained the chief complaint(s) and history of present illness.  I confirmed and edited as necessary the review of  systems, past medical/surgical history, family history, social history, and examination findings as documented by others; and I examined the patient myself.  I personally reviewed the relevant tests, images, and reports as documented above.  I formulated and edited as necessary the assessment and plan and discussed the findings and management plan with the patient and family. - Bj Key MD

## 2025-04-27 DIAGNOSIS — J30.89 CHRONIC NON-SEASONAL ALLERGIC RHINITIS: ICD-10-CM

## 2025-04-28 RX ORDER — FLUTICASONE PROPIONATE 50 MCG
SPRAY, SUSPENSION (ML) NASAL
Qty: 48 G | Refills: 1 | Status: SHIPPED | OUTPATIENT
Start: 2025-04-28

## 2025-05-08 DIAGNOSIS — K21.9 GASTROESOPHAGEAL REFLUX DISEASE WITHOUT ESOPHAGITIS: ICD-10-CM

## 2025-05-08 DIAGNOSIS — J45.40 MODERATE PERSISTENT ASTHMA WITHOUT COMPLICATION: ICD-10-CM

## 2025-05-08 DIAGNOSIS — B00.1 RECURRENT COLD SORES: ICD-10-CM

## 2025-05-08 DIAGNOSIS — I10 HYPERTENSION GOAL BP (BLOOD PRESSURE) < 140/90: ICD-10-CM

## 2025-05-08 RX ORDER — VALACYCLOVIR HYDROCHLORIDE 500 MG/1
TABLET, FILM COATED ORAL
Qty: 135 TABLET | Refills: 1 | Status: SHIPPED | OUTPATIENT
Start: 2025-05-08

## 2025-05-08 RX ORDER — AMLODIPINE AND VALSARTAN 5; 320 MG/1; MG/1
1 TABLET ORAL DAILY
Qty: 90 TABLET | Refills: 0 | Status: SHIPPED | OUTPATIENT
Start: 2025-05-08

## 2025-05-08 RX ORDER — OMEPRAZOLE 20 MG/1
CAPSULE, DELAYED RELEASE ORAL
Qty: 90 CAPSULE | Refills: 1 | Status: SHIPPED | OUTPATIENT
Start: 2025-05-08

## 2025-05-08 RX ORDER — MONTELUKAST SODIUM 10 MG/1
1 TABLET ORAL AT BEDTIME
Qty: 90 TABLET | Refills: 0 | Status: SHIPPED | OUTPATIENT
Start: 2025-05-08

## 2025-06-05 ENCOUNTER — TELEPHONE (OUTPATIENT)
Dept: FAMILY MEDICINE | Facility: CLINIC | Age: 60
End: 2025-06-05
Payer: COMMERCIAL

## 2025-06-05 NOTE — TELEPHONE ENCOUNTER
Patient Quality Outreach    Patient is due for the following:   Asthma  -  ACT needed  Colon Cancer Screening      Topic Date Due    COVID-19 Vaccine (4 - 2024-25 season) 09/01/2024    Diptheria Tetanus Pertussis (DTAP/TDAP/TD) Vaccine (2 - Td or Tdap) 01/07/2025       Action(s) Taken:   Schedule a Adult Preventative    Type of outreach:    Sent [employer] message.    Questions for provider review:    None         Luz Velasco MA  Chart routed to None.

## 2025-06-08 DIAGNOSIS — N95.1 MENOPAUSAL SYNDROME (HOT FLASHES): ICD-10-CM

## 2025-06-09 RX ORDER — PROGESTERONE 100 MG/1
100 CAPSULE ORAL DAILY
Qty: 90 CAPSULE | Refills: 0 | Status: SHIPPED | OUTPATIENT
Start: 2025-06-09

## 2025-06-09 RX ORDER — ESTRADIOL 0.5 MG/1
0.5 TABLET ORAL DAILY
Qty: 90 TABLET | Refills: 0 | Status: SHIPPED | OUTPATIENT
Start: 2025-06-09

## 2025-06-14 ENCOUNTER — HEALTH MAINTENANCE LETTER (OUTPATIENT)
Age: 60
End: 2025-06-14

## 2025-06-25 ENCOUNTER — PATIENT OUTREACH (OUTPATIENT)
Dept: FAMILY MEDICINE | Facility: CLINIC | Age: 60
End: 2025-06-25
Payer: COMMERCIAL

## 2025-06-25 DIAGNOSIS — Z98.890 S/P LEEP (STATUS POST LOOP ELECTROSURGICAL EXCISION PROCEDURE): Primary | ICD-10-CM

## 2025-08-04 DIAGNOSIS — J45.40 MODERATE PERSISTENT ASTHMA WITHOUT COMPLICATION: ICD-10-CM

## 2025-08-04 DIAGNOSIS — I10 HYPERTENSION GOAL BP (BLOOD PRESSURE) < 140/90: ICD-10-CM

## 2025-08-04 RX ORDER — AMLODIPINE AND VALSARTAN 5; 320 MG/1; MG/1
1 TABLET ORAL DAILY
Qty: 90 TABLET | Refills: 1 | Status: SHIPPED | OUTPATIENT
Start: 2025-08-04

## 2025-08-04 RX ORDER — MONTELUKAST SODIUM 10 MG/1
1 TABLET ORAL AT BEDTIME
Qty: 90 TABLET | Refills: 1 | Status: SHIPPED | OUTPATIENT
Start: 2025-08-04

## 2025-08-19 ENCOUNTER — MYC REFILL (OUTPATIENT)
Dept: FAMILY MEDICINE | Facility: CLINIC | Age: 60
End: 2025-08-19
Payer: COMMERCIAL

## 2025-08-19 DIAGNOSIS — J45.40 MODERATE PERSISTENT ASTHMA WITHOUT COMPLICATION: ICD-10-CM

## 2025-08-20 ENCOUNTER — E-VISIT (OUTPATIENT)
Dept: URGENT CARE | Facility: CLINIC | Age: 60
End: 2025-08-20
Payer: COMMERCIAL

## 2025-08-20 DIAGNOSIS — L30.9 DERMATITIS: Primary | ICD-10-CM

## 2025-08-20 PROCEDURE — 99207 PR NON-BILLABLE SERV PER CHARTING: CPT | Performed by: EMERGENCY MEDICINE

## 2025-08-20 RX ORDER — FLUOCINOLONE ACETONIDE 0.1 MG/G
CREAM TOPICAL 2 TIMES DAILY
Qty: 60 G | Refills: 1 | Status: SHIPPED | OUTPATIENT
Start: 2025-08-20

## 2025-08-21 RX ORDER — ALBUTEROL SULFATE 90 UG/1
INHALANT RESPIRATORY (INHALATION)
Qty: 18 G | Refills: 1 | Status: SHIPPED | OUTPATIENT
Start: 2025-08-21

## (undated) DEVICE — PREP CHLORAPREP 26ML TINTED ORANGE  260815

## (undated) DEVICE — SOL WATER IRRIG 1000ML BOTTLE 07139-09

## (undated) RX ORDER — FENTANYL CITRATE 50 UG/ML
INJECTION, SOLUTION INTRAMUSCULAR; INTRAVENOUS
Status: DISPENSED
Start: 2021-06-04

## (undated) RX ORDER — SIMETHICONE 40MG/0.6ML
SUSPENSION, DROPS(FINAL DOSAGE FORM)(ML) ORAL
Status: DISPENSED
Start: 2021-06-04